# Patient Record
Sex: MALE | Race: BLACK OR AFRICAN AMERICAN | NOT HISPANIC OR LATINO | URBAN - METROPOLITAN AREA
[De-identification: names, ages, dates, MRNs, and addresses within clinical notes are randomized per-mention and may not be internally consistent; named-entity substitution may affect disease eponyms.]

---

## 2017-11-24 ENCOUNTER — EMERGENCY (EMERGENCY)
Facility: HOSPITAL | Age: 67
LOS: 1 days | Discharge: HOME | End: 2017-11-24
Admitting: EMERGENCY MEDICINE
Payer: SELF-PAY

## 2017-11-24 VITALS
OXYGEN SATURATION: 99 % | SYSTOLIC BLOOD PRESSURE: 148 MMHG | DIASTOLIC BLOOD PRESSURE: 74 MMHG | WEIGHT: 205.47 LBS | HEIGHT: 64 IN | HEART RATE: 81 BPM | TEMPERATURE: 99 F | RESPIRATION RATE: 18 BRPM

## 2017-11-24 DIAGNOSIS — Z98.890 OTHER SPECIFIED POSTPROCEDURAL STATES: Chronic | ICD-10-CM

## 2017-11-24 DIAGNOSIS — Z79.899 OTHER LONG TERM (CURRENT) DRUG THERAPY: ICD-10-CM

## 2017-11-24 DIAGNOSIS — K29.70 GASTRITIS, UNSPECIFIED, WITHOUT BLEEDING: ICD-10-CM

## 2017-11-24 DIAGNOSIS — Z76.0 ENCOUNTER FOR ISSUE OF REPEAT PRESCRIPTION: ICD-10-CM

## 2017-11-24 DIAGNOSIS — I10 ESSENTIAL (PRIMARY) HYPERTENSION: ICD-10-CM

## 2017-11-24 PROCEDURE — 99283 EMERGENCY DEPT VISIT LOW MDM: CPT

## 2017-11-24 RX ORDER — OMEPRAZOLE 10 MG/1
1 CAPSULE, DELAYED RELEASE ORAL
Qty: 7 | Refills: 0
Start: 2017-11-24 | End: 2017-12-01

## 2017-11-24 NOTE — ED PROVIDER NOTE - MEDICAL DECISION MAKING DETAILS
Prescription approved per [facility].     Due for office visit/lab by 11/2017 - noted in pharmacy comments.    pt requesting refill for ppi, has not f/u w/gi clinic, offers no active c/o, diet modifications and proper f/u for endoscopy/colonoscopy discussed, pt agreeable w/plan

## 2017-11-24 NOTE — ED PROVIDER NOTE - OBJECTIVE STATEMENT
The pt is a 68 y/o undomicile M, who presents to ED requesting rx for omeprazole for his gastritis, has not gone for gi f/u, states occasional acid feeling. Denies n/v/d, abd pain, cp, sob, fevers, chills

## 2022-06-27 NOTE — ED PROVIDER NOTE - CONSTITUTIONAL DISTRESS
Patient comes to clinic for follow up anticoagulation visit.  Last INR on 6/1/22 was 2.6.  Dose maintained.   Today's INR is 2.3 and is within goal range.    Current warfarin total weekly dose of 24 mg verified.  Informed the INR result is within therapeutic range and instructed to maintain current dose per protocol. Discussed dose and return date of 7/26/22 for next INR. See Anticoagulation flowsheet.      INR today is 2.3, in range and down from previous INR 2.6, in four weeks on dose of 24mg/wk. Denies missed doses. No s/s of bldg. Pt very SOB at today's visit. No CP today and attributes it to significant dependent edema after sitting four days for a conference. AAC strongly advised PCP or WIC care to evaluate. 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 Pt will obtain Emergency care if develops CP or SOB worsens. Pt to continue same TWD of 24mg/wk; Recheck INR in four weeks.    Dr. Guardado is in the office today supervising the treatment.    Instructed to contact the clinic with any unusual bleeding or bruising, any changes in medications, diet, health status, lifestyle, or any other changes, questions or concerns. Verbalized understanding of all discussed.     
no apparent

## 2023-03-15 ENCOUNTER — INPATIENT (INPATIENT)
Facility: HOSPITAL | Age: 73
LOS: 8 days | Discharge: HOME | DRG: 922 | End: 2023-03-24
Attending: INTERNAL MEDICINE | Admitting: INTERNAL MEDICINE
Payer: MEDICAID

## 2023-03-15 VITALS
SYSTOLIC BLOOD PRESSURE: 169 MMHG | HEART RATE: 58 BPM | RESPIRATION RATE: 18 BRPM | TEMPERATURE: 82 F | OXYGEN SATURATION: 98 % | WEIGHT: 160.06 LBS | DIASTOLIC BLOOD PRESSURE: 75 MMHG

## 2023-03-15 DIAGNOSIS — R09.89 OTHER SPECIFIED SYMPTOMS AND SIGNS INVOLVING THE CIRCULATORY AND RESPIRATORY SYSTEMS: ICD-10-CM

## 2023-03-15 DIAGNOSIS — Z98.890 OTHER SPECIFIED POSTPROCEDURAL STATES: Chronic | ICD-10-CM

## 2023-03-15 LAB
A1C WITH ESTIMATED AVERAGE GLUCOSE RESULT: 6 % — HIGH (ref 4–5.6)
ALBUMIN SERPL ELPH-MCNC: 2.8 G/DL — LOW (ref 3.3–5)
ALBUMIN SERPL ELPH-MCNC: 3.7 G/DL — SIGNIFICANT CHANGE UP (ref 3.3–5)
ALP SERPL-CCNC: 139 U/L — HIGH (ref 40–120)
ALP SERPL-CCNC: 189 U/L — HIGH (ref 40–120)
ALT FLD-CCNC: 43 U/L — SIGNIFICANT CHANGE UP (ref 10–45)
ALT FLD-CCNC: 54 U/L — HIGH (ref 10–45)
AMPHET UR-MCNC: NEGATIVE — SIGNIFICANT CHANGE UP
ANION GAP SERPL CALC-SCNC: 7 MMOL/L — SIGNIFICANT CHANGE UP (ref 5–17)
ANION GAP SERPL CALC-SCNC: 8 MMOL/L — SIGNIFICANT CHANGE UP (ref 5–17)
ANISOCYTOSIS BLD QL: SIGNIFICANT CHANGE UP
APAP SERPL-MCNC: <5 UG/ML — LOW (ref 10–30)
APTT BLD: 45.7 SEC — HIGH (ref 27.5–35.5)
AST SERPL-CCNC: 47 U/L — HIGH (ref 10–40)
AST SERPL-CCNC: 57 U/L — HIGH (ref 10–40)
BARBITURATES UR SCN-MCNC: NEGATIVE — SIGNIFICANT CHANGE UP
BASE EXCESS BLDV CALC-SCNC: 1.2 MMOL/L — SIGNIFICANT CHANGE UP (ref -2–3)
BASOPHILS # BLD AUTO: 0.02 K/UL — SIGNIFICANT CHANGE UP (ref 0–0.2)
BASOPHILS NFR BLD AUTO: 0.9 % — SIGNIFICANT CHANGE UP (ref 0–2)
BENZODIAZ UR-MCNC: NEGATIVE — SIGNIFICANT CHANGE UP
BILIRUB SERPL-MCNC: 0.2 MG/DL — SIGNIFICANT CHANGE UP (ref 0.2–1.2)
BILIRUB SERPL-MCNC: 0.2 MG/DL — SIGNIFICANT CHANGE UP (ref 0.2–1.2)
BUN SERPL-MCNC: 10 MG/DL — SIGNIFICANT CHANGE UP (ref 7–23)
BUN SERPL-MCNC: 12 MG/DL — SIGNIFICANT CHANGE UP (ref 7–23)
BURR CELLS BLD QL SMEAR: PRESENT — SIGNIFICANT CHANGE UP
CA-I SERPL-SCNC: 1.26 MMOL/L — SIGNIFICANT CHANGE UP (ref 1.15–1.33)
CALCIUM SERPL-MCNC: 8.6 MG/DL — SIGNIFICANT CHANGE UP (ref 8.4–10.5)
CALCIUM SERPL-MCNC: 9.8 MG/DL — SIGNIFICANT CHANGE UP (ref 8.4–10.5)
CHLORIDE SERPL-SCNC: 107 MMOL/L — SIGNIFICANT CHANGE UP (ref 96–108)
CHLORIDE SERPL-SCNC: 109 MMOL/L — HIGH (ref 96–108)
CO2 BLDV-SCNC: 31.2 MMOL/L — HIGH (ref 22–26)
CO2 SERPL-SCNC: 26 MMOL/L — SIGNIFICANT CHANGE UP (ref 22–31)
CO2 SERPL-SCNC: 28 MMOL/L — SIGNIFICANT CHANGE UP (ref 22–31)
COCAINE METAB.OTHER UR-MCNC: NEGATIVE — SIGNIFICANT CHANGE UP
CREAT SERPL-MCNC: 0.69 MG/DL — SIGNIFICANT CHANGE UP (ref 0.5–1.3)
CREAT SERPL-MCNC: 0.8 MG/DL — SIGNIFICANT CHANGE UP (ref 0.5–1.3)
DACRYOCYTES BLD QL SMEAR: SLIGHT — SIGNIFICANT CHANGE UP
EGFR: 94 ML/MIN/1.73M2 — SIGNIFICANT CHANGE UP
EGFR: 98 ML/MIN/1.73M2 — SIGNIFICANT CHANGE UP
EOSINOPHIL # BLD AUTO: 0.04 K/UL — SIGNIFICANT CHANGE UP (ref 0–0.5)
EOSINOPHIL NFR BLD AUTO: 1.7 % — SIGNIFICANT CHANGE UP (ref 0–6)
ESTIMATED AVERAGE GLUCOSE: 126 MG/DL — HIGH (ref 68–114)
ETHANOL SERPL-MCNC: <10 MG/DL — SIGNIFICANT CHANGE UP (ref 0–10)
FLUAV AG NPH QL: SIGNIFICANT CHANGE UP
FLUBV AG NPH QL: SIGNIFICANT CHANGE UP
GAS PNL BLDV: 140 MMOL/L — SIGNIFICANT CHANGE UP (ref 136–145)
GAS PNL BLDV: SIGNIFICANT CHANGE UP
GAS PNL BLDV: SIGNIFICANT CHANGE UP
GIANT PLATELETS BLD QL SMEAR: PRESENT — SIGNIFICANT CHANGE UP
GLUCOSE BLDC GLUCOMTR-MCNC: 97 MG/DL — SIGNIFICANT CHANGE UP (ref 70–99)
GLUCOSE SERPL-MCNC: 47 MG/DL — CRITICAL LOW (ref 70–99)
GLUCOSE SERPL-MCNC: 77 MG/DL — SIGNIFICANT CHANGE UP (ref 70–99)
HCO3 BLDV-SCNC: 29 MMOL/L — SIGNIFICANT CHANGE UP (ref 22–29)
HCT VFR BLD CALC: 36.1 % — LOW (ref 39–50)
HGB BLD-MCNC: 10.9 G/DL — LOW (ref 13–17)
HYPOCHROMIA BLD QL: SIGNIFICANT CHANGE UP
INR BLD: 1.02 — SIGNIFICANT CHANGE UP (ref 0.88–1.16)
LACTATE SERPL-SCNC: 0.8 MMOL/L — SIGNIFICANT CHANGE UP (ref 0.5–2)
LEGIONELLA AG UR QL: NEGATIVE — SIGNIFICANT CHANGE UP
LEGIONELLA AG UR QL: NEGATIVE — SIGNIFICANT CHANGE UP
LIDOCAIN IGE QN: 22 U/L — SIGNIFICANT CHANGE UP (ref 7–60)
LYMPHOCYTES # BLD AUTO: 0.55 K/UL — LOW (ref 1–3.3)
LYMPHOCYTES # BLD AUTO: 25.2 % — SIGNIFICANT CHANGE UP (ref 13–44)
MACROCYTES BLD QL: SLIGHT — SIGNIFICANT CHANGE UP
MAGNESIUM SERPL-MCNC: 1.8 MG/DL — SIGNIFICANT CHANGE UP (ref 1.6–2.6)
MANUAL SMEAR VERIFICATION: SIGNIFICANT CHANGE UP
MCHC RBC-ENTMCNC: 20.8 PG — LOW (ref 27–34)
MCHC RBC-ENTMCNC: 30.2 GM/DL — LOW (ref 32–36)
MCV RBC AUTO: 68.9 FL — LOW (ref 80–100)
METHADONE UR-MCNC: NEGATIVE — SIGNIFICANT CHANGE UP
MICROCYTES BLD QL: SIGNIFICANT CHANGE UP
MONOCYTES # BLD AUTO: 0.11 K/UL — SIGNIFICANT CHANGE UP (ref 0–0.9)
MONOCYTES NFR BLD AUTO: 5.2 % — SIGNIFICANT CHANGE UP (ref 2–14)
NEUTROPHILS # BLD AUTO: 1.41 K/UL — LOW (ref 1.8–7.4)
NEUTROPHILS NFR BLD AUTO: 64.4 % — SIGNIFICANT CHANGE UP (ref 43–77)
OPIATES UR-MCNC: NEGATIVE — SIGNIFICANT CHANGE UP
OSMOLALITY SERPL: 294 MOSM/KG — SIGNIFICANT CHANGE UP (ref 280–301)
OVALOCYTES BLD QL SMEAR: SLIGHT — SIGNIFICANT CHANGE UP
PCO2 BLDV: 61 MMHG — HIGH (ref 42–55)
PCP SPEC-MCNC: SIGNIFICANT CHANGE UP
PCP UR-MCNC: NEGATIVE — SIGNIFICANT CHANGE UP
PH BLDV: 7.29 — LOW (ref 7.32–7.43)
PHOSPHATE SERPL-MCNC: 3.6 MG/DL — SIGNIFICANT CHANGE UP (ref 2.5–4.5)
PLAT MORPH BLD: ABNORMAL
PLATELET # BLD AUTO: 271 K/UL — SIGNIFICANT CHANGE UP (ref 150–400)
PO2 BLDV: 46 MMHG — HIGH (ref 25–45)
POIKILOCYTOSIS BLD QL AUTO: SIGNIFICANT CHANGE UP
POLYCHROMASIA BLD QL SMEAR: SLIGHT — SIGNIFICANT CHANGE UP
POTASSIUM BLDV-SCNC: 3.7 MMOL/L — SIGNIFICANT CHANGE UP (ref 3.5–5.1)
POTASSIUM SERPL-MCNC: 3.5 MMOL/L — SIGNIFICANT CHANGE UP (ref 3.5–5.3)
POTASSIUM SERPL-MCNC: 4 MMOL/L — SIGNIFICANT CHANGE UP (ref 3.5–5.3)
POTASSIUM SERPL-SCNC: 3.5 MMOL/L — SIGNIFICANT CHANGE UP (ref 3.5–5.3)
POTASSIUM SERPL-SCNC: 4 MMOL/L — SIGNIFICANT CHANGE UP (ref 3.5–5.3)
PROT SERPL-MCNC: 5.8 G/DL — LOW (ref 6–8.3)
PROT SERPL-MCNC: 7.6 G/DL — SIGNIFICANT CHANGE UP (ref 6–8.3)
PROTHROM AB SERPL-ACNC: 12.1 SEC — SIGNIFICANT CHANGE UP (ref 10.5–13.4)
RAPID RVP RESULT: SIGNIFICANT CHANGE UP
RBC # BLD: 5.24 M/UL — SIGNIFICANT CHANGE UP (ref 4.2–5.8)
RBC # FLD: 18.5 % — HIGH (ref 10.3–14.5)
RBC BLD AUTO: ABNORMAL
RSV RNA NPH QL NAA+NON-PROBE: SIGNIFICANT CHANGE UP
S PNEUM AG UR QL: NEGATIVE — SIGNIFICANT CHANGE UP
S PNEUM AG UR QL: NEGATIVE — SIGNIFICANT CHANGE UP
SALICYLATES SERPL-MCNC: <0.3 MG/DL — LOW (ref 2.8–20)
SAO2 % BLDV: 73.1 % — SIGNIFICANT CHANGE UP (ref 67–88)
SARS-COV-2 RNA SPEC QL NAA+PROBE: SIGNIFICANT CHANGE UP
SARS-COV-2 RNA SPEC QL NAA+PROBE: SIGNIFICANT CHANGE UP
SCHISTOCYTES BLD QL AUTO: SLIGHT — SIGNIFICANT CHANGE UP
SMUDGE CELLS # BLD: PRESENT — SIGNIFICANT CHANGE UP
SODIUM SERPL-SCNC: 142 MMOL/L — SIGNIFICANT CHANGE UP (ref 135–145)
SODIUM SERPL-SCNC: 143 MMOL/L — SIGNIFICANT CHANGE UP (ref 135–145)
SPHEROCYTES BLD QL SMEAR: SLIGHT — SIGNIFICANT CHANGE UP
TARGETS BLD QL SMEAR: SIGNIFICANT CHANGE UP
THC UR QL: NEGATIVE — SIGNIFICANT CHANGE UP
TSH SERPL-MCNC: 1.25 UIU/ML — SIGNIFICANT CHANGE UP (ref 0.27–4.2)
VARIANT LYMPHS # BLD: 2.6 % — SIGNIFICANT CHANGE UP (ref 0–6)
WBC # BLD: 2.19 K/UL — LOW (ref 3.8–10.5)
WBC # FLD AUTO: 2.19 K/UL — LOW (ref 3.8–10.5)

## 2023-03-15 PROCEDURE — 71045 X-RAY EXAM CHEST 1 VIEW: CPT | Mod: 26

## 2023-03-15 PROCEDURE — 71260 CT THORAX DX C+: CPT | Mod: 26

## 2023-03-15 PROCEDURE — 72125 CT NECK SPINE W/O DYE: CPT | Mod: 26

## 2023-03-15 PROCEDURE — 70450 CT HEAD/BRAIN W/O DYE: CPT | Mod: 26

## 2023-03-15 PROCEDURE — 99291 CRITICAL CARE FIRST HOUR: CPT

## 2023-03-15 PROCEDURE — 99291 CRITICAL CARE FIRST HOUR: CPT | Mod: GC

## 2023-03-15 PROCEDURE — 74177 CT ABD & PELVIS W/CONTRAST: CPT | Mod: 26

## 2023-03-15 RX ORDER — PIPERACILLIN AND TAZOBACTAM 4; .5 G/20ML; G/20ML
3.38 INJECTION, POWDER, LYOPHILIZED, FOR SOLUTION INTRAVENOUS ONCE
Refills: 0 | Status: COMPLETED | OUTPATIENT
Start: 2023-03-15 | End: 2023-03-15

## 2023-03-15 RX ORDER — NICOTINE POLACRILEX 2 MG
1 GUM BUCCAL DAILY
Refills: 0 | Status: DISCONTINUED | OUTPATIENT
Start: 2023-03-15 | End: 2023-03-16

## 2023-03-15 RX ORDER — VANCOMYCIN HCL 1 G
1000 VIAL (EA) INTRAVENOUS ONCE
Refills: 0 | Status: COMPLETED | OUTPATIENT
Start: 2023-03-15 | End: 2023-03-15

## 2023-03-15 RX ORDER — SODIUM CHLORIDE 9 MG/ML
1000 INJECTION INTRAMUSCULAR; INTRAVENOUS; SUBCUTANEOUS ONCE
Refills: 0 | Status: COMPLETED | OUTPATIENT
Start: 2023-03-15 | End: 2023-03-15

## 2023-03-15 RX ORDER — ENOXAPARIN SODIUM 100 MG/ML
70 INJECTION SUBCUTANEOUS EVERY 12 HOURS
Refills: 0 | Status: DISCONTINUED | OUTPATIENT
Start: 2023-03-15 | End: 2023-03-17

## 2023-03-15 RX ORDER — NOREPINEPHRINE BITARTRATE/D5W 8 MG/250ML
0.05 PLASTIC BAG, INJECTION (ML) INTRAVENOUS
Qty: 8 | Refills: 0 | Status: DISCONTINUED | OUTPATIENT
Start: 2023-03-15 | End: 2023-03-15

## 2023-03-15 RX ORDER — CHLORHEXIDINE GLUCONATE 213 G/1000ML
1 SOLUTION TOPICAL
Refills: 0 | Status: DISCONTINUED | OUTPATIENT
Start: 2023-03-15 | End: 2023-03-16

## 2023-03-15 RX ORDER — INFLUENZA VIRUS VACCINE 15; 15; 15; 15 UG/.5ML; UG/.5ML; UG/.5ML; UG/.5ML
0.7 SUSPENSION INTRAMUSCULAR ONCE
Refills: 0 | Status: DISCONTINUED | OUTPATIENT
Start: 2023-03-15 | End: 2023-03-24

## 2023-03-15 RX ORDER — PANTOPRAZOLE SODIUM 20 MG/1
40 TABLET, DELAYED RELEASE ORAL
Refills: 0 | Status: DISCONTINUED | OUTPATIENT
Start: 2023-03-15 | End: 2023-03-24

## 2023-03-15 RX ADMIN — SODIUM CHLORIDE 1000 MILLILITER(S): 9 INJECTION INTRAMUSCULAR; INTRAVENOUS; SUBCUTANEOUS at 09:43

## 2023-03-15 RX ADMIN — PIPERACILLIN AND TAZOBACTAM 200 GRAM(S): 4; .5 INJECTION, POWDER, LYOPHILIZED, FOR SOLUTION INTRAVENOUS at 10:25

## 2023-03-15 RX ADMIN — SODIUM CHLORIDE 1000 MILLILITER(S): 9 INJECTION INTRAMUSCULAR; INTRAVENOUS; SUBCUTANEOUS at 10:30

## 2023-03-15 RX ADMIN — ENOXAPARIN SODIUM 70 MILLIGRAM(S): 100 INJECTION SUBCUTANEOUS at 15:41

## 2023-03-15 RX ADMIN — Medication 250 MILLIGRAM(S): at 11:38

## 2023-03-15 RX ADMIN — Medication 6.81 MICROGRAM(S)/KG/MIN: at 12:36

## 2023-03-15 NOTE — ED PROVIDER NOTE - OBJECTIVE STATEMENT
72-year-old male undomiciled with hypertension presenting after being found on the street.  Patient states he was on the street since middle of February, states he was previously admitted to [facility], does not know why.  Patient confused but denies any acute complaints.  Denies chest pain or abdominal pain, no shortness of breath, no cough, no urinary issues.  No trauma.  ROS as above.

## 2023-03-15 NOTE — ED PROVIDER NOTE - CLINICAL SUMMARY MEDICAL DECISION MAKING FREE TEXT BOX
72-year-old male with history of hypertension, gastritis, undomiciled presenting hypothermic to 81 °F, confused, bradycardic with Mosher wave on EKG, possible environmentally induced but with possible infiltrate on chest x-ray.  Ordered for broad-spectrum antibiotics.  Patient placed on Stefany hugger giving warm saline, will place Ashley and perform bladder irrigation, labs pending.  Discussed with ICU consult.
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
## 2023-03-15 NOTE — ED PROVIDER NOTE - NS ED ROS FT
Constitutional: No fever or chills  Eyes: No discharge or drainage  Ears, Nose, Mouth, Throat: No nasal discharge, no sore throat  Cardiovascular: No chest pain, no palpitations  Respiratory: No shortness of breath, no cough  Gastrointestinal: No nausea or vomiting, no abdominal pain, no diarrhea or constipation  Musculoskeletal: No joint pain, no swelling  Skin: No rashes or lesions  Neurological: No numbness, weakness, tingling, no headache  Endocrine: No nightsweats, no weight loss  Hematologic/Lymphatic: No lymphadenopathy

## 2023-03-15 NOTE — H&P ADULT - ASSESSMENT
71 y/o M with an unknown pmhx, who presented to the ED with severe hypothermia to 81.5 F and sepsis of unknown origin.   ICU consulted for severe hypothermia.    NEUROLOGY  #Encephalopathy  Unknown baseline mental status, patient was found in the street confused. Now awake and responsive but not interacting. Likely secondary to hypothermia secondary to sepsis of unknown origin.   - treat underlying hypothermia and sepsis as below  - f/u urine tox, alcohol level, acetaminophen tox, and salycilate tox screen    # r/o head trauma  Patient is a poor historian but endorsed falling and possible head trauma. Endorsed ttp of right aspect of face. Resists attempt to open eyes for pupillary exam.   - CT head and cervical spine    CARDIOVASCULAR  # Hypothermia  Presented with severe hypothermia, with Core (Rectal) Temp 81.5 deg F. Started on Acute Internal Rewarming Protocol.  - started on warmed isotonic fluids and warmed bladder irrigation  - goal increase core temp by 2 degrees centigrade q 1 hour  - continue with warmed isotonic IV fluids (warmed to 40-42 degrees C)  - monitor core temperature     #Hypotension  #Distributive shock  Shock secondary to hypothermia and subsequent vasodilation from rewarming, process secondary to sepsis of unknown origin.   - receiving second bolus of warmed NS now  - started levophed for MAP goal of 65    RESPIRATORY   Normal sats on room air.     RENAL   - placed zheng  - monitor I/O    ENDOCRINE  - f/u A1C, TSH  - monitor fsg as pt not taking PO    GASTROINTESTINAL  # remote history of GI Bleed  Patient reported to ED in 2017 a history of "gastric surgery for GI bleed" and was on Omeprazole at the time. P/w Hemoglobin 10.9. No hematemesis on exam. Unable to obtain ROS.  - obtain better ROS/collateral when possible  - monitor H/H    INFECTIOUS DISEASE  # Severe sepsis  Unknown etiology. Presenting with leukopenia, hypothermia and requiring pressor support. Has LE swelling bilaterally. Abdomen soft on exam. No ulcers or overt abscess noted on physical exam. No pulmonoary consolidation on bedside POCUS.  - full infectious workup:  - blood cultures, urinalysis/cx. RVP, ur legionella, ur strep  - f/u CT abdomen, pelvis, chest  - Treat empirically with vancomycin and zosyn for now    HEMATOLOGY/ONCOLOGY  # Leukopenia  - treat for sepsis as above    # anemia with microcytosis  - obtain iron studies  - active T&S    MSK  # LE swelling  - f/u bilateral LE duplex      PROPHYLACTIC MEASURES  FLUIDS: receiving warmed NS currently  ELECTROLYTES: Mg<2, K<4  DIET: NPO  VTE PPX: SCD, lovenox  CODE: FULL  DISPO: MICU  71 y/o undomiciled Male current smoker (1ppd x50+yrs), w/ PSHx of unspecified GI surgery reportedly for GIB, and PMHx of HTN who was initially BIBEMS for dizziness, inability to walk and AMS, was found to be profoundly hypothermic and is now admitted to MICU.    NEUROLOGY  #AMS/Encephalopathy  Initially minimally interactive only nodding head, now returned to baselined MS (A+Ox3) and does remember events leading up to 911 being called. CT head neg for trauma/acute changes. Likely etiology hypothermia  - Treatment of hypothermia as below  - UTox ordered, f/u    CARDIOVASCULAR  #Hypotension  #Distributive shock  Shock secondary to hypothermia and subsequent vasodilation from rewarming, process secondary to sepsis of unknown origin.   - c/w active rewarming protocol, (goal temp 96F)  - c/w Levo ggt (MAP goal 65-70), wean as tolerated    HTN  Reports hx of HTN, SBP 160s on arrival. Reports being Rx'd anti-HTN meds in the past but does not known which and hasn't been taking  - Determine need for anti-HTN once off pressors    RESPIRATORY  #Active smoker  - Nicotine patch q24hrs PRN ordered (1ppd x50+ years)    RENAL  No active issues, renal function at baseline  -Ashley placed in ED 3/15 for active rewarming bladder irrigation, may be DC'd once that's complete    ENDOCRINE  #Hypothermia  P/w severe hypothermia w/ T81.5F rectally. TSH WNL. Most likely etiology environmental exposure  - c/w Active Internal Rewarming Protocol including bare hugger, warmed IVF and bladder irrigation (goal to increase T 2 degrees Celcius/hour until goal T 96.0F)    #pre-DM  A1c 6.0 this admission  - FS BGL per routine while NPO pending dysphagia screen    GASTROINTESTINAL  #Remote hx of GIB  Pt reports hx of "gastric surgery for GI bleed" in 2017 which he believes was @[facility] but no documentation available. Reports previously taking Omeprazole but has not taken any Rx's in some time  Hb 10.9 on admission. No evidence of GIB at this time, pt denies recent melena/hematochezia/hematemesis  - Started Pantoprazole 40mg PO QD  ____________________________________________________________________________________________________  ID  #Leukopenia  P/w WBC 2.19 w/o shift. Unknown etiology. Non-toxic appearing, hypothermia, no clear evidence of infection  - full infectious workup:  - blood cultures, urinalysis/cx. RVP, ur legionella, ur strep  - f/u CT abdomen, pelvis, chest  - Treat empirically with vancomycin and zosyn for now    HEMATOLOGY/ONCOLOGY  # Leukopenia  - treat for sepsis as above    #Microcytic anemia  - obtain iron studies  - active T&S    MSK  # LE swelling  - f/u bilateral LE duplex    Lines/Tubes    PROPHYLACTIC MEASURES  FLUIDS: receiving warmed NS currently  ELECTROLYTES: Mg<2, K<4  DIET: NPO  VTE PPX: SCD, lovenox  CODE: FULL  DISPO: MICU  73 y/o undomiciled Male current smoker (1ppd x50+yrs), w/ PSHx of unspecified GI surgery reportedly for GIB, and PMHx of HTN who was initially BIBEMS for dizziness, inability to walk and AMS, was found to be profoundly hypothermic and is now admitted to MICU.    NEUROLOGY  #AMS/Encephalopathy  Initially minimally interactive only nodding head, now returned to baselined MS (A+Ox3) and does remember events leading up to 911 being called. CT head neg for trauma/acute changes. Likely etiology hypothermia  - Treatment of hypothermia as below  - UTox ordered, f/u      CARDIOVASCULAR  #Hypotension  Likely etiology distributive shock 2/2 vasodilation from active rewarming. Lactate WNL.  - c/w active rewarming protocol, (goal temp 96F)  - c/w Levo ggt (MAP goal 65-70), wean as tolerated    #Bradycardia  Initially p/w HR 31 w/ Collado waves on EKG. Likely etiology hypothermia  - Mgmt of hypothermia as below    #r/o CHF  Denies hx of CHF but reports worsening b/l LE pitting edema  - Echo ordered, f/u    #r/o DVT  Given finding of PE and worsening b/l LE pitting edema  - LE duplex ordered, f/u    HTN  Reports hx of HTN, SBP 160s on arrival. Reports being Rx'd anti-HTN meds in the past but does not known which and hasn't been taking  - Determine need for anti-HTN once off pressors      RESPIRATORY  #Pulmonary embolism  - CT chest showing RLL segmental PE w/o evidence of RV dysfunction  - Started on therapeutic dose Lovenox  - Echo ordered as above, f/u  - Poor candidate for Coumadin 2/2 hx of medication non-compliance and undomiciled    #Active smoker  - Nicotine patch q24hrs PRN ordered (1ppd x50+ years)      RENAL  No active issues, renal function at baseline  -Zheng placed in ED 3/15 for active rewarming bladder irrigation, may be DC'd once that's complete      ENDOCRINE  #Hypothermia  P/w severe hypothermia w/ T81.5F rectally. TSH WNL. Most likely etiology environmental exposure  - c/w Active Internal Rewarming Protocol including bare hugger, warmed IVF and bladder irrigation (goal to increase T 2 degrees Celcius/hour until goal T 96.0F)    #pre-DM  A1c 6.0 this admission      GASTROINTESTINAL  #Remote hx of GIB  Pt reports hx of "gastric surgery for GI bleed" in 2017 which he believes was @[facility] but no documentation available. Reports previously taking Omeprazole but has not taken any Rx's in some time  Hb 10.9 on admission. No evidence of GIB at this time, pt denies recent melena/hematochezia/hematemesis  - Started Pantoprazole 40mg PO QD      ID  #Leukopenia  P/w WBC 2.19 w/o shift. Unknown etiology. Non-toxic appearing, hypothermia, no clear evidence of infection.  Urine leg neg, RVP neg. S/p 1 dose of Vanco and Zosyn in the ED, not continued  - Blood and Urine Cx sent 3/15, f/u  - UA, Urine strep, and MRSA swab sent, f/u  - Monitor off abx for now      HEMATOLOGY/ONCOLOGY  # Leukopenia  - treat for sepsis as above    #Microcytic anemia  P/w Hb 10.9 and MCV 68.9  - Iron studies sent, f/u  - Maintain active T&S (next due 3/19)  - Transfused for Hb <7    Lines/Tubes  -Indwelling zheng (placed 3/15 for active rewarming protocol only)  -PIV x2    PROPHYLACTIC MEASURES  FLUIDS: None  ELECTROLYTES: Mg<2, K<4  DIET: DASH  VTE PPX: Lovenox (treatment dose for PE)  CODE: FULL  DISPO: MICU    Case d/w Dr. Jensen
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
## 2023-03-15 NOTE — PATIENT PROFILE ADULT - HISTORY OF COVID-19 VACCINATION
Referral was placed on 1/26/17 by urgent care MD.    Please see below and advise.    Medication pending   Yes

## 2023-03-15 NOTE — H&P ADULT - HISTORY OF PRESENT ILLNESS
Patient is a 73 y/o M with unknown past medical history, was BIBEMS after being found outside appearing confused. In the ED, patient was found to be severely hypothermic to 81.5 core rectal temp. Initially unable to review ROS as patient was minimally interactive, just nodding yes/no. Per ED staff, patient was initially talking when he first presented to the ED, and he reported that he had been admitted at [facility] for unknown reason, was discharged to the street on February 17th, and has been on the street since then. Per chart review, patient presented to ED in 2017 and at the time reported a history of a "gastric surgery for GI bleeding" and was on Omeprazole.     ED COURSE:   Vitals: T 81.5 HR 30s, /75 --> 70s/40s RR 18 - 98% on RA  Labs: leukopenia WBC 2.19, Hb 10.9, microcytosis 68.9, Plt 271, bmp wnl, lactate 0.8  Interventions: Started on Acute Internal Rewarming protocol with: Warmed isotonic fluids, warmed bladder irrigation, bare hugger, vancomycin, zosyn 73 y/o undomiciled Male current smoker (1ppd x50+yrs), w/ PSHx of unspecified GI surgery reportedly for GIB, and PMHx of HTN who was initially BIBEMS after being found outside AMS c/o dizziness and inability to walk. On arrival to ED VS: T 81.5F rectal, HR On arrival ROS difficult to obtained as pt was minimally interactive, only nodding.    ________________________     was BIBEMS after being found outside appearing confused. In the ED, patient was found to be severely hypothermic to 81.5 core rectal temp. Initially unable to review ROS as patient was minimally interactive, just nodding yes/no. Per ED staff, patient was initially talking when he first presented to the ED, and he reported that he had been admitted at [facility] for unknown reason, was discharged to the street on February 17th, and has been on the street since then. Per chart review, patient presented to ED in 2017 and at the time reported a history of a "gastric surgery for GI bleeding" and was on Omeprazole.     ED COURSE:   Vitals: T 81.5 HR 30s, /75 --> 70s/40s RR 18 - 98% on RA  Labs: leukopenia WBC 2.19, Hb 10.9, microcytosis 68.9, Plt 271, bmp wnl, lactate 0.8  Interventions: Started on Acute Internal Rewarming protocol with: Warmed isotonic fluids, warmed bladder irrigation, bare hugger, vancomycin, zosyn 73 y/o undomiciled Male current smoker (1ppd x50+yrs), w/ PSHx of unspecified GI surgery reportedly for GIB, and PMHx of HTN who was initially BIBEMS after being found outside AMS c/o dizziness and inability to walk. On arrival to ED VS: T 81.5F rectal, HR 58, /75, RR 18, SpO2 98% on RA. On arrival ROS difficult to obtained as pt was minimally interactive, only nodding his head. Of note, pt was recently admitted to [facility] for an unspecified reason and has not followed up with any doctors since, routinely takes no meds at home and no longer has an established PCP. Pt denies chest pain, SOB, YI, palpitations, LOC, N/V/D, fever/chills/sick contacts, diaphoresis, and orthopnea/PND. Labs significant for COVID PCR neg, WBC 2.19, Hb 10.9, MVC 68, mild transaminitis, and TSH WNL.  MICU was consulted by ED and evaluated at bedside. Pt started on acute internal rewarming protocol including warmed IVF, warmed bladder irrigation, and bare hugger applied, started on empiric Vanco and Zosyn, and was subsequently admitted to the MICU for further management and evaluation.

## 2023-03-15 NOTE — CONSULT NOTE ADULT - SUBJECTIVE AND OBJECTIVE BOX
ICU CONSULT NOTE    Patient is a 72y old  Male who presents with a chief complaint of     ROS positive for      PMHx -   PSx -   Meds -   Allergies -   FHx -   Sx -     PHYSICAL EXAM   Vital Signs Last 24 Hrs  T(C): 31.7 (15 Mar 2023 11:21), Max: 31.7 (15 Mar 2023 11:21)  T(F): 89.1 (15 Mar 2023 11:21), Max: 89.1 (15 Mar 2023 11:21)  HR: 41 (15 Mar 2023 11:21) (33 - 58)  BP: 98/51 (15 Mar 2023 11:21) (79/42 - 169/75)  BP(mean): 69 (15 Mar 2023 11:21) (55 - 100)  RR: 16 (15 Mar 2023 11:21) (16 - 18)  SpO2: 91% (15 Mar 2023 11:21) (91% - 100%)    Parameters below as of 15 Mar 2023 11:21  Patient On (Oxygen Delivery Method): room air        General -   HEENT -   CV -   Resp -   Abdomen -   Extremities -   Skin -       LABS                        10.9   2.19  )-----------( 271      ( 15 Mar 2023 09:11 )             36.1     03-15    142  |  107  |  12  ----------------------------<  77  4.0   |  28  |  0.69    Ca    9.8      15 Mar 2023 09:11    TPro  7.6  /  Alb  3.7  /  TBili  0.2  /  DBili  x   /  AST  57<H>  /  ALT  54<H>  /  AlkPhos  189<H>  03-15    PT/INR - ( 15 Mar 2023 09:11 )   PT: 12.1 sec;   INR: 1.02          PTT - ( 15 Mar 2023 09:11 )  PTT:45.7 sec  Lactate, Blood: 0.8 mmol/L (03-15-23 @ 09:11)        IMAGING   CXR -   EKG -  ICU CONSULT NOTE    Patient is a 72y old  Male with unknown past medical history, who was BIBEMS after being found outside appearing confused, and was found in the ED to be severely hypothermic to 81.5 core rectal temp.   ICU consulted for severe hypothermia.     Unable to review ROS as patient is minimally interactive. Nods yes/no. Confirms his name, and answers yes to questions about being cold, having a fall, and possibly a head trauama. Does not provide more information. Shakes head no to abdominal pain.   Per ED staff, patient was initially talking when he first presented to the ED, and he reported that he had been admitted at [facility] for unknown reason, was discharged to the street on February 17th, and has been on the street since then.   Per chart review, patient presented to ED in 2017 and at the time reported a history of a "gastric surgery for GI bleeding" and was on Omeprazole.     ED COURSE:   Vitals: T 81.5 HR jigar down to 30s, BP initially 166/75 --> 70s/40s RR 18 - 98% - RA  Labs: notable for leukopenia wbc 2.19, Hb 10.9, microcytosis 68.9, Plt 271, bmp wnl, lactate 0.8  Interventions: Started on Acute Internal Rewarming protocol with: Warmed isotonic fluids, warmed bladder irrigation, bare hugger, vancomycin, zosyn    PHYSICAL EXAM   Vital Signs Last 24 Hrs  T(C): 31.7 (15 Mar 2023 11:21), Max: 31.7 (15 Mar 2023 11:21)  T(F): 89.1 (15 Mar 2023 11:21), Max: 89.1 (15 Mar 2023 11:21)  HR: 41 (15 Mar 2023 11:21) (33 - 58)  BP: 98/51 (15 Mar 2023 11:21) (79/42 - 169/75)  BP(mean): 69 (15 Mar 2023 11:21) (55 - 100)  RR: 16 (15 Mar 2023 11:21) (16 - 18)  SpO2: 91% (15 Mar 2023 11:21) (91% - 100%)    Parameters below as of 15 Mar 2023 11:21  Patient On (Oxygen Delivery Method): room air      PHYSICAL EXAM:  General: Somnolent, keeping eyes shut, arousable, nods head yes/no  HEENT: Holding eyes tightly shut, resists attempt to open eyes   Neck: supple  Respiratory: CTA b/l  Cardiovascular: +S1/S2; RRR  Abdomen: soft, NT/ND; +BS x4  Extremities: LE edema, R>L  Skin: normal color and turgor; no rash  Neurological: Encephalopathic, AAOx0 (does not answer orientation questions)      LABS                        10.9   2.19  )-----------( 271      ( 15 Mar 2023 09:11 )             36.1     03-15    142  |  107  |  12  ----------------------------<  77  4.0   |  28  |  0.69    Ca    9.8      15 Mar 2023 09:11    TPro  7.6  /  Alb  3.7  /  TBili  0.2  /  DBili  x   /  AST  57<H>  /  ALT  54<H>  /  AlkPhos  189<H>  03-15    PT/INR - ( 15 Mar 2023 09:11 )   PT: 12.1 sec;   INR: 1.02          PTT - ( 15 Mar 2023 09:11 )  PTT:45.7 sec  Lactate, Blood: 0.8 mmol/L (03-15-23 @ 09:11)

## 2023-03-15 NOTE — CONSULT NOTE ADULT - ASSESSMENT
REYNA GARCIA is a 72y Male with a past medical history of   ICU consulted for     NEUROLOGY  #Encephalopathy  Unknown baseline mental status, patient was found in the street confused. Now awake and responsive but not interacting. Likely secondary to hypothermia secondary to sepsis of unknown origin.   - treat underlying hypothermia and sepsis as below  - f/u urine tox, alcohol level, acetaminophen tox, and salycilate tox screen    CARDIOVASCULAR  #Hypotension  #Distributive shock  Shock secondary to hypothermia and subsequent vasodilation from rewarming, process secondary to sepsis of unknown origin.   - receiving second bolus of warmed NS now  - started levophed for MAP goal of 65    RESPIRATORY       RENAL       ENDOCRINE      GASTROINTESTINAL      INFECTIOUS DISEASE      HEMATOLOGY/ONCOLOGY      MSK      METABOLIC      ACCESS/LINES/DRAINS      ETHICS/GOALS OF CARE      PROPHYLACTIC  FLUIDS: none at this time  ELECTROLYTES: Mg<2, K<4  DIET: regular  GI PPX: protonix  VTE PPX: SCD, lovenox  CODE: FULL  DISPO:  REYNA GARCIA is a 72y Male with an unknown PMH, who presented to the ED with severe Hypothermia to 81.5 F and sepsis of unknown origin.   ICU consulted for severe hypothermia.    NEUROLOGY  #Encephalopathy  Unknown baseline mental status, patient was found in the street confused. Now awake and responsive but not interacting. Likely secondary to hypothermia secondary to sepsis of unknown origin.   - treat underlying hypothermia and sepsis as below  - f/u urine tox, alcohol level, acetaminophen tox, and salycilate tox screen    # r/o head trauma  Patient is a poor historian but endorsed falling and possible head trauma. Endorsed ttp of right aspect of face. Resists attempt to open eyes for pupillary exam.   - CT head and cervical spine    CARDIOVASCULAR  # Hypothermia  Presented with severe hypothermia, with Core (Rectal) Temp 81.5 deg F. Started on Acute Internal Rewarming Protocol.  - started on warmed isotonic fluids and warmed bladder irrigation  - goal increase core temp by 2 degrees centigrade q 1 hour  - continue with warmed isotonic IV fluids (warmed to 40-42 degrees C)  - monitor core temperature     #Hypotension  #Distributive shock  Shock secondary to hypothermia and subsequent vasodilation from rewarming, process secondary to sepsis of unknown origin.   - receiving second bolus of warmed NS now  - started levophed for MAP goal of 65    RESPIRATORY   Normal sats on room air.     RENAL   - placed zheng  - monitor I/O    ENDOCRINE  - f/u A1C, TSH  - monitor fsg as pt not taking PO    GASTROINTESTINAL  # remote history of GI Bleed  Patient reported to ED in 2017 a history of "gastric surgery for GI bleed" and was on Omeprazole at the time. P/w Hemoglobin 10.9. No hematemesis on exam. Unable to obtain ROS.  - obtain better ROS/collateral when possible  - monitor H/H    INFECTIOUS DISEASE  # Severe sepsis  Unknown etiology. Presenting with leukopenia, hypothermia and requiring pressor support. Has LE swelling bilaterally. Abdomen soft on exam. No ulcers or overt abscess noted on physical exam. No pulmonoary consolidation on bedside POCUS.  - full infectious workup:  - blood cultures, urinalysis/cx. RVP, ur legionella, ur strep  - f/u CT abdomen, pelvis, chest  - Treat empirically with vancomycin and zosyn for now    HEMATOLOGY/ONCOLOGY  # Leukopenia  - treat for sepsis as above    # anemia with microcytosis  - obtain iron studies  - active T&S    MSK  # LE swelling  - f/u bilateral LE duplex      PROPHYLACTIC  FLUIDS: receiving warmed NS currently  ELECTROLYTES: Mg<2, K<4  DIET: NPO  VTE PPX: SCD, lovenox  CODE: FULL  DISPO: MICU

## 2023-03-15 NOTE — PATIENT PROFILE ADULT - FALL HARM RISK - HARM RISK INTERVENTIONS

## 2023-03-15 NOTE — ED PROVIDER NOTE - PHYSICAL EXAMINATION
General: Disheveled, in mild distress  HEENT: Normocephalic, atraumatic, extraocular movements intact  CV: Bradycardic  Pulm: No respiratory distress, no tachypnea  Abd: Flat, no gross distension, soft, nontender  Ext: warm and well perfused  Skin: No gross rashes or lesions  Neuro: Alert and oriented but confused, moving all extremities, repetitive answering of questions

## 2023-03-15 NOTE — H&P ADULT - MUSCULOSKELETAL
normal/ROM intact/normal gait/strength 5/5 bilateral upper extremities/strength 5/5 bilateral lower extremities/decreased strength
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
## 2023-03-15 NOTE — ED ADULT NURSE NOTE - NS ED NOTE ABUSE RESPONSE YN
I called patient and there was a bad phone connections so she stated that she would call back. Unable to assess due to medical condition

## 2023-03-15 NOTE — PROGRESS NOTE ADULT - SUBJECTIVE AND OBJECTIVE BOX
*** TRANSFER FROM MICU TO [facility] ***    HOSPITAL COURSE:  73 YO undomiciled M with PMHx current active smoker (1PPD x 50 years), PSHx GI surgery, HTN BIBEMS for dizziness and confusion found to be hypothermic to 81.5F. Patient admitted to MICU for active rewarming protocol, levophed gtt for vasodilation while rewarming, and strict UOP monitoring s/p Ashley in ED. Upon arrival to MICU, patient warmed to 95-96, AOx3, awake/alert, mentating well, passed bedside dysphagia. Patient remained off levophed gtt and stable for stepdown to [facility].    SUBJECTIVE:   Patient seen and examined at bedside. Feels well, pleasant. AOx3.     OBJECTIVE:    VITAL SIGNS:  ICU Vital Signs Last 24 Hrs  T(C): 35.1 (15 Mar 2023 14:48), Max: 35.1 (15 Mar 2023 14:48)  T(F): 95.2 (15 Mar 2023 14:48), Max: 95.2 (15 Mar 2023 14:48)  HR: 81 (15 Mar 2023 17:00) (33 - 81)  BP: 107/66 (15 Mar 2023 17:00) (79/42 - 169/75)  BP(mean): 79 (15 Mar 2023 17:00) (55 - 100)  RR: 16 (15 Mar 2023 17:00) (12 - 31)  SpO2: 100% (15 Mar 2023 17:00) (93% - 100%)    O2 Parameters below as of 15 Mar 2023 17:00  Patient On (Oxygen Delivery Method): room air              03-15 @ 07:01  -  03-15 @ 17:38  --------------------------------------------------------  IN: 0 mL / OUT: 325 mL / NET: -325 mL      CAPILLARY BLOOD GLUCOSE      POCT Blood Glucose.: 97 mg/dL (15 Mar 2023 17:19)      PHYSICAL EXAM:    General: NAD; Lying comfortably in bed  HEENT: NC/AT; MMM. Exophthalmos (R>L)   Neck: Supple. No JVD or thyromegaly   Respiratory: CTA b/l. No wheezes, rhonchi, rales.  Cardiovascular: +S1/S2; RRR; No murmurs, rubs, gallops.  Abdomen: soft, NT/ND; +BS x4  Extremities: WWP, 2+ peripheral pulses b/l; no LE edema  Neurological: AOx3    MEDICATIONS:  MEDICATIONS  (STANDING):  chlorhexidine 4% Liquid 1 Application(s) Topical <User Schedule>  enoxaparin Injectable 70 milliGRAM(s) SubCutaneous every 12 hours  influenza  Vaccine (HIGH DOSE) 0.7 milliLiter(s) IntraMuscular once  pantoprazole    Tablet 40 milliGRAM(s) Oral before breakfast    MEDICATIONS  (PRN):  nicotine - 21 mG/24Hr(s) Patch 1 Patch Transdermal daily PRN Cravings      ALLERGIES:  Allergies    No Known Allergies    Intolerances        LABS:                        10.9   2.19  )-----------( 271      ( 15 Mar 2023 09:11 )             36.1     03-15    143  |  109<H>  |  10  ----------------------------<  47<LL>  3.5   |  26  |  0.80    Ca    8.6      15 Mar 2023 16:42  Phos  3.6     03-15  Mg     1.8     03-15    TPro  5.8<L>  /  Alb  2.8<L>  /  TBili  0.2  /  DBili  x   /  AST  47<H>  /  ALT  43  /  AlkPhos  139<H>  03-15    PT/INR - ( 15 Mar 2023 09:11 )   PT: 12.1 sec;   INR: 1.02          PTT - ( 15 Mar 2023 09:11 )  PTT:45.7 sec      RADIOLOGY & ADDITIONAL TESTS: Reviewed.   *** TRANSFER FROM MICU TO [facility] ***    HOSPITAL COURSE:  71 YO undomiciled M with PMHx current active smoker (1PPD x 50 years), PSHx GI surgery, HTN BIBEMS for dizziness and confusion found to be hypothermic to 81.5F. Patient admitted to MICU for active rewarming protocol, levophed gtt for vasodilation while rewarming, and strict UOP monitoring s/p Ashley in ED. Upon arrival to MICU, patient warmed to 95-96, AOx3, awake/alert, mentating well, passed bedside dysphagia. Hospital course complicated by PE, started on full dose lovenox. Patient remained off levophed gtt and stable for stepdown to [facility].    SUBJECTIVE:   Patient seen and examined at bedside. Feels well, pleasant. AOx3.     OBJECTIVE:    VITAL SIGNS:  ICU Vital Signs Last 24 Hrs  T(C): 35.1 (15 Mar 2023 14:48), Max: 35.1 (15 Mar 2023 14:48)  T(F): 95.2 (15 Mar 2023 14:48), Max: 95.2 (15 Mar 2023 14:48)  HR: 81 (15 Mar 2023 17:00) (33 - 81)  BP: 107/66 (15 Mar 2023 17:00) (79/42 - 169/75)  BP(mean): 79 (15 Mar 2023 17:00) (55 - 100)  RR: 16 (15 Mar 2023 17:00) (12 - 31)  SpO2: 100% (15 Mar 2023 17:00) (93% - 100%)    O2 Parameters below as of 15 Mar 2023 17:00  Patient On (Oxygen Delivery Method): room air              03-15 @ 07:01  -  03-15 @ 17:38  --------------------------------------------------------  IN: 0 mL / OUT: 325 mL / NET: -325 mL      CAPILLARY BLOOD GLUCOSE      POCT Blood Glucose.: 97 mg/dL (15 Mar 2023 17:19)      PHYSICAL EXAM:    General: NAD; Lying comfortably in bed  HEENT: NC/AT; MMM. Exophthalmos (R>L)   Neck: Supple. No JVD or thyromegaly   Respiratory: CTA b/l. No wheezes, rhonchi, rales.  Cardiovascular: +S1/S2; RRR; No murmurs, rubs, gallops.  Abdomen: soft, NT/ND; +BS x4  Extremities: WWP, 2+ peripheral pulses b/l; no LE edema  Neurological: AOx3    MEDICATIONS:  MEDICATIONS  (STANDING):  chlorhexidine 4% Liquid 1 Application(s) Topical <User Schedule>  enoxaparin Injectable 70 milliGRAM(s) SubCutaneous every 12 hours  influenza  Vaccine (HIGH DOSE) 0.7 milliLiter(s) IntraMuscular once  pantoprazole    Tablet 40 milliGRAM(s) Oral before breakfast    MEDICATIONS  (PRN):  nicotine - 21 mG/24Hr(s) Patch 1 Patch Transdermal daily PRN Cravings      ALLERGIES:  Allergies    No Known Allergies    Intolerances        LABS:                        10.9   2.19  )-----------( 271      ( 15 Mar 2023 09:11 )             36.1     03-15    143  |  109<H>  |  10  ----------------------------<  47<LL>  3.5   |  26  |  0.80    Ca    8.6      15 Mar 2023 16:42  Phos  3.6     03-15  Mg     1.8     03-15    TPro  5.8<L>  /  Alb  2.8<L>  /  TBili  0.2  /  DBili  x   /  AST  47<H>  /  ALT  43  /  AlkPhos  139<H>  03-15    PT/INR - ( 15 Mar 2023 09:11 )   PT: 12.1 sec;   INR: 1.02          PTT - ( 15 Mar 2023 09:11 )  PTT:45.7 sec      RADIOLOGY & ADDITIONAL TESTS: Reviewed.

## 2023-03-15 NOTE — H&P ADULT - NSICDXPASTMEDICALHX_GEN_ALL_CORE_FT
PAST MEDICAL HISTORY:  Gastritis     Hypertension      PAST MEDICAL HISTORY:  Current smoker     GIB (gastrointestinal bleeding)     Hypertension

## 2023-03-15 NOTE — H&P ADULT - NSHPLABSRESULTS_GEN_ALL_CORE
LABS:                         10.9   2.19  )-----------( 271      ( 15 Mar 2023 09:11 )             36.1     03-15    142  |  107  |  12  ----------------------------<  77  4.0   |  28  |  0.69    Ca    9.8      15 Mar 2023 09:11    TPro  7.6  /  Alb  3.7  /  TBili  0.2  /  DBili  x   /  AST  57<H>  /  ALT  54<H>  /  AlkPhos  189<H>  03-15    PT/INR - ( 15 Mar 2023 09:11 )   PT: 12.1 sec;   INR: 1.02          PTT - ( 15 Mar 2023 09:11 )  PTT:45.7 sec    CAPILLARY BLOOD GLUCOSE    Lactate, Blood: 0.8 mmol/L (03-15 @ 09:11)    RADIOLOGY, EKG & ADDITIONAL TESTS:

## 2023-03-15 NOTE — PROGRESS NOTE ADULT - ASSESSMENT
73 y/o undomiciled Male current smoker (1ppd x50+yrs), w/ PSHx of unspecified GI surgery reportedly for GIB, and PMHx of HTN who was initially BIBEMS for dizziness, inability to walk and AMS, was found to be profoundly hypothermic and is now admitted to MICU.    NEUROLOGY  #AMS/Encephalopathy  Initially minimally interactive only nodding head, now returned to baselined MS (A+Ox3) and does remember events leading up to 911 being called. CT head neg for trauma/acute changes. Likely etiology hypothermia. Upon arrival to MICU and rewarmed, now mentating at baseline.   - Treatment of hypothermia as below  - UTox ordered, F/U    CARDIOVASCULAR  #Hypotension  Likely etiology distributive shock 2/2 vasodilation from active rewarming. Lactate WNL.  - Now off levophed gtt     #Bradycardia  Initially p/w HR 31 w/ Collado waves on EKG. Likely etiology hypothermia. HR improved upon rewarming.     #r/o CHF  Denies hx of CHF but reports worsening b/l LE pitting edema  - Echo ordered, f/u    #r/o DVT  Given finding of PE and worsening b/l LE pitting edema  - LE duplex ordered, f/u    RESPIRATORY  #Pulmonary embolism  No known or prior hx of PE. Undomiciled but mobile. No known hx of Afib, has been in NSR while admitted on telemetry.  - CT chest showing RLL segmental PE w/o evidence of RV dysfunction  - Started on therapeutic dose Lovenox --> will need to determine outpatient regimen if pt accepts shelter  - Echo ordered as above, f/u  - Will need further workup regarding etiology of PE    #Active smoker  - Nicotine patch q24hrs PRN ordered (1ppd x50+ years)    RENAL  No active issues, renal function at baseline  -Ashley placed in ED 3/15 for active rewarming bladder irrigation  - Removed in MICU prior to stepdown   - F/U TOV    ENDOCRINE  #Hypothermia  P/w severe hypothermia w/ T81.5F rectally. TSH WNL. Most likely etiology environmental exposure  - c/w Active Internal Rewarming Protocol including bare hugger, warmed IVF and bladder irrigation (goal to increase T 2 degrees Celcius/hour until goal T 96.0F)    #pre-DM  A1c 6.0 this admission      GASTROINTESTINAL  #Remote hx of GIB  Pt reports hx of "gastric surgery for GI bleed" in 2017 which he believes was @[facility] but no documentation available. Reports previously taking Omeprazole but has not taken any Rx's in some time  Hb 10.9 on admission. No evidence of GIB at this time, pt denies recent melena/hematochezia/hematemesis  - Started Pantoprazole 40mg PO QD      ID  #Leukopenia  P/w WBC 2.19 w/o shift. Unknown etiology. Non-toxic appearing, hypothermia, no clear evidence of infection.  Urine leg neg, RVP neg. S/p 1 dose of Vanco and Zosyn in the ED, not continued  - Blood and Urine Cx sent 3/15, f/u  - UA, Urine strep, and MRSA swab sent, f/u  - Monitor off abx for now      HEMATOLOGY/ONCOLOGY  #Microcytic anemia  P/w Hb 10.9 and MCV 68.9  - Iron studies sent, f/u  - Maintain active T&S (next due 3/19)  - Transfused for Hb <7    PROPHYLACTIC MEASURES  FLUIDS: None  ELECTROLYTES: Mg<2, K<4  DIET: DASH  VTE PPX: Lovenox (treatment dose for PE)  CODE: FULL  DISPO: MICU 71 y/o undomiciled Male current smoker (1ppd x50+yrs), w/ PSHx of unspecified GI surgery reportedly for GIB, and PMHx of HTN who was initially BIBEMS for dizziness, inability to walk and AMS, was found to be profoundly hypothermic and is now admitted to MICU. Hospital course complicated by PE, started on full dose lovenox. Now stable for transfer to [facility].    NEUROLOGY  #AMS/Encephalopathy  Initially minimally interactive only nodding head, now returned to baselined MS (A+Ox3) and does remember events leading up to 911 being called. CT head neg for trauma/acute changes. Likely etiology hypothermia. Upon arrival to MICU and rewarmed, now mentating at baseline.   - Treatment of hypothermia as below  - UTox ordered, F/U    CARDIOVASCULAR  #Hypotension  Likely etiology distributive shock 2/2 vasodilation from active rewarming. Lactate WNL.  - Now off levophed gtt     #Bradycardia  Initially p/w HR 31 w/ Collado waves on EKG. Likely etiology hypothermia. HR improved upon rewarming.     #r/o CHF  Denies hx of CHF but reports worsening b/l LE pitting edema  - Echo ordered, f/u    #r/o DVT  Given finding of PE and worsening b/l LE pitting edema  - LE duplex ordered, f/u    RESPIRATORY  #Pulmonary embolism  No known or prior hx of PE. Undomiciled but mobile. No known hx of Afib, has been in NSR while admitted on telemetry.  - CT chest showing RLL segmental PE w/o evidence of RV dysfunction  - Started on therapeutic dose Lovenox --> will need to determine outpatient regimen if pt accepts shelter  - Echo ordered as above, f/u  - Will need further workup regarding etiology of PE    #Active smoker  - Nicotine patch q24hrs PRN ordered (1ppd x50+ years)    RENAL  No active issues, renal function at baseline  -Ashley placed in ED 3/15 for active rewarming bladder irrigation  - Removed in MICU prior to stepdown   - F/U TOV    ENDOCRINE  #Hypothermia  P/w severe hypothermia w/ T81.5F rectally. TSH WNL. Most likely etiology environmental exposure  - c/w Active Internal Rewarming Protocol including bare hugger, warmed IVF and bladder irrigation (goal to increase T 2 degrees Celcius/hour until goal T 96.0F)    #pre-DM  A1c 6.0 this admission      GASTROINTESTINAL  #Remote hx of GIB  Pt reports hx of "gastric surgery for GI bleed" in 2017 which he believes was @[facility] but no documentation available. Reports previously taking Omeprazole but has not taken any Rx's in some time  Hb 10.9 on admission. No evidence of GIB at this time, pt denies recent melena/hematochezia/hematemesis  - Started Pantoprazole 40mg PO QD      ID  #Leukopenia  P/w WBC 2.19 w/o shift. Unknown etiology. Non-toxic appearing, hypothermia, no clear evidence of infection.  Urine leg neg, RVP neg. S/p 1 dose of Vanco and Zosyn in the ED, not continued  - Blood and Urine Cx sent 3/15, f/u  - UA, Urine strep, and MRSA swab sent, f/u  - Monitor off abx for now      HEMATOLOGY/ONCOLOGY  #Microcytic anemia  P/w Hb 10.9 and MCV 68.9  - Iron studies sent, f/u  - Maintain active T&S (next due 3/19)  - Transfused for Hb <7    PROPHYLACTIC MEASURES  FLUIDS: None  ELECTROLYTES: Mg<2, K<4  DIET: DASH  VTE PPX: Lovenox (treatment dose for PE)  CODE: FULL  DISPO: MICU

## 2023-03-15 NOTE — ED ADULT NURSE REASSESSMENT NOTE - NS ED NURSE REASSESS COMMENT FT1
ICU team at bedside
Urology at bedside for zhegn insertion.
unable to obtain rectal temp. pt cold to touch. pt placed on janett hugger and given warm blankets. Pt Alert, awake, orientedx4, maintaining airway w spontaneous resps, unlabored breathing, following commands appropriately, able to move all extremities.
IV sites clean, dry, intact. No adverse rx to first time meds in ED. Pending ICU bed. Continuous cardiac/pulse oximetry monitoring intact.

## 2023-03-15 NOTE — H&P ADULT - NSHPPHYSICALEXAM_GEN_ALL_CORE
PHYSICAL EXAM: INCOMPLETE......    Vital Signs Last 24 Hrs  T(C): 35.1 (15 Mar 2023 14:48), Max: 35.1 (15 Mar 2023 14:48)  T(F): 95.2 (15 Mar 2023 14:48), Max: 95.2 (15 Mar 2023 14:48)  HR: 71 (15 Mar 2023 15:00) (33 - 75)  BP: 119/56 (15 Mar 2023 15:00) (79/42 - 169/75)  BP(mean): 81 (15 Mar 2023 15:00) (55 - 100)  RR: 31 (15 Mar 2023 15:00) (16 - 31)  SpO2: 100% (15 Mar 2023 15:00) (93% - 100%)    Parameters below as of 15 Mar 2023 15:00  Patient On (Oxygen Delivery Method): room air      I&O's Summary    15 Mar 2023 07:01  -  15 Mar 2023 15:09  --------------------------------------------------------  IN: 0 mL / OUT: 225 mL / NET: -225 mL        General: somnolent but arousable, nodding head yes/no  HEENT: Holding eyes tightly shut, resists attempt to open eyes   Neck: supple  Respiratory: CTAB  Cardiovascular: +S1/S2; RRR  Abdomen: soft, NT/ND; +BS x4  Extremities: LE edema, R>L  Skin: normal color and turgor; no rash  Neurological: Encephalopathic,

## 2023-03-16 DIAGNOSIS — D72.819 DECREASED WHITE BLOOD CELL COUNT, UNSPECIFIED: ICD-10-CM

## 2023-03-16 DIAGNOSIS — Z29.9 ENCOUNTER FOR PROPHYLACTIC MEASURES, UNSPECIFIED: ICD-10-CM

## 2023-03-16 DIAGNOSIS — I10 ESSENTIAL (PRIMARY) HYPERTENSION: ICD-10-CM

## 2023-03-16 DIAGNOSIS — I26.99 OTHER PULMONARY EMBOLISM WITHOUT ACUTE COR PULMONALE: ICD-10-CM

## 2023-03-16 DIAGNOSIS — R68.89 OTHER GENERAL SYMPTOMS AND SIGNS: ICD-10-CM

## 2023-03-16 DIAGNOSIS — G93.49 OTHER ENCEPHALOPATHY: ICD-10-CM

## 2023-03-16 DIAGNOSIS — R60.0 LOCALIZED EDEMA: ICD-10-CM

## 2023-03-16 DIAGNOSIS — R73.03 PREDIABETES: ICD-10-CM

## 2023-03-16 DIAGNOSIS — N17.9 ACUTE KIDNEY FAILURE, UNSPECIFIED: ICD-10-CM

## 2023-03-16 DIAGNOSIS — Z87.19 PERSONAL HISTORY OF OTHER DISEASES OF THE DIGESTIVE SYSTEM: ICD-10-CM

## 2023-03-16 DIAGNOSIS — D64.9 ANEMIA, UNSPECIFIED: ICD-10-CM

## 2023-03-16 LAB
ALBUMIN SERPL ELPH-MCNC: 2.9 G/DL — LOW (ref 3.3–5)
ALP SERPL-CCNC: 156 U/L — HIGH (ref 40–120)
ALT FLD-CCNC: 47 U/L — HIGH (ref 10–45)
ANION GAP SERPL CALC-SCNC: 7 MMOL/L — SIGNIFICANT CHANGE UP (ref 5–17)
APPEARANCE UR: CLEAR — SIGNIFICANT CHANGE UP
AST SERPL-CCNC: 53 U/L — HIGH (ref 10–40)
BASOPHILS # BLD AUTO: 0.03 K/UL — SIGNIFICANT CHANGE UP (ref 0–0.2)
BASOPHILS NFR BLD AUTO: 1 % — SIGNIFICANT CHANGE UP (ref 0–2)
BILIRUB SERPL-MCNC: <0.2 MG/DL — SIGNIFICANT CHANGE UP (ref 0.2–1.2)
BILIRUB SERPL-MCNC: <0.2 MG/DL — SIGNIFICANT CHANGE UP (ref 0.2–1.2)
BILIRUB UR-MCNC: NEGATIVE — SIGNIFICANT CHANGE UP
BUN SERPL-MCNC: 14 MG/DL — SIGNIFICANT CHANGE UP (ref 7–23)
CALCIUM SERPL-MCNC: 8.8 MG/DL — SIGNIFICANT CHANGE UP (ref 8.4–10.5)
CHLORIDE SERPL-SCNC: 110 MMOL/L — HIGH (ref 96–108)
CO2 SERPL-SCNC: 28 MMOL/L — SIGNIFICANT CHANGE UP (ref 22–31)
COLOR SPEC: YELLOW — SIGNIFICANT CHANGE UP
CREAT ?TM UR-MCNC: 36 MG/DL — SIGNIFICANT CHANGE UP
CREAT SERPL-MCNC: 1.15 MG/DL — SIGNIFICANT CHANGE UP (ref 0.5–1.3)
CREAT SERPL-MCNC: 1.19 MG/DL — SIGNIFICANT CHANGE UP (ref 0.5–1.3)
DIFF PNL FLD: NEGATIVE — SIGNIFICANT CHANGE UP
EGFR: 65 ML/MIN/1.73M2 — SIGNIFICANT CHANGE UP
EGFR: 68 ML/MIN/1.73M2 — SIGNIFICANT CHANGE UP
EOSINOPHIL # BLD AUTO: 0.03 K/UL — SIGNIFICANT CHANGE UP (ref 0–0.5)
EOSINOPHIL NFR BLD AUTO: 1 % — SIGNIFICANT CHANGE UP (ref 0–6)
FERRITIN SERPL-MCNC: 33 NG/ML — SIGNIFICANT CHANGE UP (ref 30–400)
GLUCOSE BLDC GLUCOMTR-MCNC: 111 MG/DL — HIGH (ref 70–99)
GLUCOSE BLDC GLUCOMTR-MCNC: 167 MG/DL — HIGH (ref 70–99)
GLUCOSE BLDC GLUCOMTR-MCNC: 71 MG/DL — SIGNIFICANT CHANGE UP (ref 70–99)
GLUCOSE BLDC GLUCOMTR-MCNC: 90 MG/DL — SIGNIFICANT CHANGE UP (ref 70–99)
GLUCOSE SERPL-MCNC: 64 MG/DL — LOW (ref 70–99)
GLUCOSE UR QL: NEGATIVE — SIGNIFICANT CHANGE UP
HCT VFR BLD CALC: 29.6 % — LOW (ref 39–50)
HCV AB S/CO SERPL IA: 0.04 S/CO — SIGNIFICANT CHANGE UP
HCV AB SERPL-IMP: SIGNIFICANT CHANGE UP
HGB BLD-MCNC: 9.1 G/DL — LOW (ref 13–17)
IMM GRANULOCYTES NFR BLD AUTO: 0 % — SIGNIFICANT CHANGE UP (ref 0–0.9)
INR BLD: 1.01 — SIGNIFICANT CHANGE UP (ref 0.88–1.16)
IRON SATN MFR SERPL: 21 % — SIGNIFICANT CHANGE UP (ref 16–55)
IRON SATN MFR SERPL: 56 UG/DL — SIGNIFICANT CHANGE UP (ref 45–165)
KETONES UR-MCNC: NEGATIVE — SIGNIFICANT CHANGE UP
LEUKOCYTE ESTERASE UR-ACNC: NEGATIVE — SIGNIFICANT CHANGE UP
LYMPHOCYTES # BLD AUTO: 0.75 K/UL — LOW (ref 1–3.3)
LYMPHOCYTES # BLD AUTO: 24.3 % — SIGNIFICANT CHANGE UP (ref 13–44)
MAGNESIUM SERPL-MCNC: 2 MG/DL — SIGNIFICANT CHANGE UP (ref 1.6–2.6)
MCHC RBC-ENTMCNC: 21.2 PG — LOW (ref 27–34)
MCHC RBC-ENTMCNC: 30.7 GM/DL — LOW (ref 32–36)
MCV RBC AUTO: 68.8 FL — LOW (ref 80–100)
MELD SCORE WITH DIALYSIS: 20 POINTS — SIGNIFICANT CHANGE UP
MELD SCORE WITHOUT DIALYSIS: 8 POINTS — SIGNIFICANT CHANGE UP
MONOCYTES # BLD AUTO: 0.39 K/UL — SIGNIFICANT CHANGE UP (ref 0–0.9)
MONOCYTES NFR BLD AUTO: 12.6 % — SIGNIFICANT CHANGE UP (ref 2–14)
MRSA PCR RESULT.: NEGATIVE — SIGNIFICANT CHANGE UP
NEUTROPHILS # BLD AUTO: 1.89 K/UL — SIGNIFICANT CHANGE UP (ref 1.8–7.4)
NEUTROPHILS NFR BLD AUTO: 61.1 % — SIGNIFICANT CHANGE UP (ref 43–77)
NITRITE UR-MCNC: NEGATIVE — SIGNIFICANT CHANGE UP
NRBC # BLD: 0 /100 WBCS — SIGNIFICANT CHANGE UP (ref 0–0)
OSMOLALITY UR: 230 MOSM/KG — LOW (ref 300–900)
PH UR: 6 — SIGNIFICANT CHANGE UP (ref 5–8)
PHOSPHATE SERPL-MCNC: 3.8 MG/DL — SIGNIFICANT CHANGE UP (ref 2.5–4.5)
PLATELET # BLD AUTO: 252 K/UL — SIGNIFICANT CHANGE UP (ref 150–400)
POTASSIUM SERPL-MCNC: 4 MMOL/L — SIGNIFICANT CHANGE UP (ref 3.5–5.3)
POTASSIUM SERPL-SCNC: 4 MMOL/L — SIGNIFICANT CHANGE UP (ref 3.5–5.3)
POTASSIUM UR-SCNC: 9 MMOL/L — SIGNIFICANT CHANGE UP
PROT ?TM UR-MCNC: 7 MG/DL — SIGNIFICANT CHANGE UP (ref 0–12)
PROT SERPL-MCNC: 6.1 G/DL — SIGNIFICANT CHANGE UP (ref 6–8.3)
PROT UR-MCNC: NEGATIVE MG/DL — SIGNIFICANT CHANGE UP
PROT/CREAT UR-RTO: 0.2 RATIO — SIGNIFICANT CHANGE UP (ref 0–0.2)
PROTHROM AB SERPL-ACNC: 12 SEC — SIGNIFICANT CHANGE UP (ref 10.5–13.4)
RBC # BLD: 4.3 M/UL — SIGNIFICANT CHANGE UP (ref 4.2–5.8)
RBC # FLD: 17.6 % — HIGH (ref 10.3–14.5)
S AUREUS DNA NOSE QL NAA+PROBE: POSITIVE
SODIUM SERPL-SCNC: 143 MMOL/L — SIGNIFICANT CHANGE UP (ref 135–145)
SODIUM SERPL-SCNC: 145 MMOL/L — SIGNIFICANT CHANGE UP (ref 135–145)
SODIUM UR-SCNC: 63 MMOL/L — SIGNIFICANT CHANGE UP
SP GR SPEC: 1.01 — SIGNIFICANT CHANGE UP (ref 1–1.03)
T4 FREE SERPL-MCNC: 0.97 NG/DL — SIGNIFICANT CHANGE UP (ref 0.93–1.7)
TIBC SERPL-MCNC: 267 UG/DL — SIGNIFICANT CHANGE UP (ref 220–430)
UIBC SERPL-MCNC: 211 UG/DL — SIGNIFICANT CHANGE UP (ref 110–370)
UROBILINOGEN FLD QL: 0.2 E.U./DL — SIGNIFICANT CHANGE UP
UUN UR-MCNC: 178 MG/DL — SIGNIFICANT CHANGE UP
WBC # BLD: 3.09 K/UL — LOW (ref 3.8–10.5)
WBC # FLD AUTO: 3.09 K/UL — LOW (ref 3.8–10.5)

## 2023-03-16 PROCEDURE — 99233 SBSQ HOSP IP/OBS HIGH 50: CPT | Mod: GC

## 2023-03-16 PROCEDURE — 93970 EXTREMITY STUDY: CPT | Mod: 26

## 2023-03-16 PROCEDURE — 93010 ELECTROCARDIOGRAM REPORT: CPT | Mod: 76

## 2023-03-16 RX ORDER — DEXTROSE 50 % IN WATER 50 %
50 SYRINGE (ML) INTRAVENOUS ONCE
Refills: 0 | Status: DISCONTINUED | OUTPATIENT
Start: 2023-03-16 | End: 2023-03-16

## 2023-03-16 RX ORDER — APIXABAN 2.5 MG/1
2 TABLET, FILM COATED ORAL
Qty: 120 | Refills: 0
Start: 2023-03-16 | End: 2023-04-14

## 2023-03-16 RX ORDER — NICOTINE POLACRILEX 2 MG
1 GUM BUCCAL DAILY
Refills: 0 | Status: DISCONTINUED | OUTPATIENT
Start: 2023-03-16 | End: 2023-03-24

## 2023-03-16 RX ORDER — DEXTROSE 10 % IN WATER 10 %
250 INTRAVENOUS SOLUTION INTRAVENOUS
Refills: 0 | Status: COMPLETED | OUTPATIENT
Start: 2023-03-16 | End: 2023-03-16

## 2023-03-16 RX ADMIN — PANTOPRAZOLE SODIUM 40 MILLIGRAM(S): 20 TABLET, DELAYED RELEASE ORAL at 06:03

## 2023-03-16 RX ADMIN — ENOXAPARIN SODIUM 70 MILLIGRAM(S): 100 INJECTION SUBCUTANEOUS at 05:57

## 2023-03-16 RX ADMIN — Medication 1000 MILLILITER(S): at 09:20

## 2023-03-16 RX ADMIN — Medication 1 PATCH: at 13:37

## 2023-03-16 RX ADMIN — Medication 1 PATCH: at 19:00

## 2023-03-16 RX ADMIN — ENOXAPARIN SODIUM 70 MILLIGRAM(S): 100 INJECTION SUBCUTANEOUS at 18:49

## 2023-03-16 NOTE — PROGRESS NOTE ADULT - PROBLEM SELECTOR PLAN 6
RESOLVED   p/w severe hypothermia w/ T81.5F rectally. TSH WNL. Most likely etiology environmental exposure  - s/p Active Internal Rewarming Protocol including bairhugger, warmed IVF and bladder irrigation (goal to increase T 2 degrees Celcius/hour until goal T 96.0F) - now normothermic      #Hypotension - RESOLVED   Likely etiology distributive shock 2/2 vasodilation from active rewarming. Lactate WNL.  - s/p active rewarming protocol, (goal temp 96F) and levophed gtt; off levophed gtt since 3/15 3pm     #Bradycardia - RESOLVED   Initially p/w HR 31 w/ Collado waves on EKG. Likely etiology hypothermia  - HR 60s-70s

## 2023-03-16 NOTE — PROGRESS NOTE ADULT - PROBLEM SELECTOR PLAN 4
Reports hx of HTN, SBP 160s on arrival. Reports being Rx'd anti-HTN meds in the past but does not known which and hasn't been taking    - normotensive currently; continue to monitor BP Hgb 10.9 on admission , MCV 68.9. Some schistocytes present. Currently no signs of active bleeding (no hematochezia, melena, hemoptysis, hematuria). Iron studies showing Iron of 56, TIBC of 267, Ferritin of 33, and Iron % Saturation of 21. 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   Plan:   - trend CBC  - maintain active T&S (next due 3/19)   - transfuse if Hgb <7

## 2023-03-16 NOTE — PROGRESS NOTE ADULT - PROBLEM SELECTOR PLAN 8
FLUIDS: None  ELECTROLYTES: Mg<2, K<4  DIET: DASH  VTE PPX: Lovenox (treatment dose for PE)  CODE: FULL  DISPO: MICU > [facility] Pt reports hx of "gastric surgery for GI bleed" in 2017 which he believes was @[facility] but no documentation available. Reports previously taking Omeprazole but has not taken any Rx's in some time. Hb 10.9 on admission.  No evidence of GIB at this time, pt denies recent melena/hematochezia/hematemesis    - c/w Pantoprazole 40mg PO QD

## 2023-03-16 NOTE — PROGRESS NOTE ADULT - SUBJECTIVE AND OBJECTIVE BOX
CC: Patient is a 72y old  Male who presents with a chief complaint of Hypothermia, AMS    * INCOMPLETE NOTE *  *STEPDOWN FROM MICU TO [facility] *  HOSPITAL COURSE:  73 y/o undomiciled M with PMHx current active smoker (1PPD x 50 years), PSHx GI surgery, HTN, BIBEMS for dizziness, difficulty with ambulation and confusion, found to be hypothermic to 81.5F (rectal), admitted to MICU for active rewarming protocol (warmed IVF, warmed bladder irrigation, bairhugger), levophed gtt for vasodilation while rewarming, and strict UOP monitoring s/p Ashley in ED. Upon arrival to MICU, patient warmed to 95-96F, AOx3, awake/alert, mentating well. Hospital course complicated by RLL segmental PE (no R heart strain), started on full dose lovenox. Off levophed gtt since 3pm, and pt is medically stable for stepdown to [facility] for further management.       INTERVAL EVENTS: LIZA  SUBJECTIVE / INTERVAL HPI: Patient seen and examined at bedside.   ROS: negative unless otherwise stated above.    VITAL SIGNS:  Vital Signs Last 24 Hrs  T(C): 36.4 (16 Mar 2023 05:09), Max: 36.4 (16 Mar 2023 01:00)  T(F): 97.6 (16 Mar 2023 05:09), Max: 97.6 (16 Mar 2023 01:00)  HR: 80 (16 Mar 2023 05:09) (33 - 81)  BP: 116/65 (16 Mar 2023 05:09) (79/42 - 169/75)  BP(mean): 82 (15 Mar 2023 23:00) (55 - 100)  RR: 17 (16 Mar 2023 05:09) (12 - 31)  SpO2: 99% (16 Mar 2023 05:09) (93% - 100%)    Parameters below as of 16 Mar 2023 05:09  Patient On (Oxygen Delivery Method): room air          03-15-23 @ 07:01  -  03-16-23 @ 06:33  --------------------------------------------------------  IN: 0 mL / OUT: 975 mL / NET: -975 mL        PHYSICAL EXAM:    General: NAD  HEENT: MMM  Neck: supple  Cardiovascular: +S1/S2; RRR  Respiratory: CTA B/L; no W/R/R  Gastrointestinal: soft, NT/ND  Extremities: WWP; no edema, clubbing or cyanosis  Vascular: 2+ radial, DP/PT pulses B/L  Neurological: AAOx3; no focal deficits    MEDICATIONS:  MEDICATIONS  (STANDING):  enoxaparin Injectable 70 milliGRAM(s) SubCutaneous every 12 hours  influenza  Vaccine (HIGH DOSE) 0.7 milliLiter(s) IntraMuscular once  pantoprazole    Tablet 40 milliGRAM(s) Oral before breakfast    MEDICATIONS  (PRN):  nicotine - 21 mG/24Hr(s) Patch 1 Patch Transdermal daily PRN Cravings      ALLERGIES:  Allergies    No Known Allergies    Intolerances        LABS:                        10.9   2.19  )-----------( 271      ( 15 Mar 2023 09:11 )             36.1     03-15    143  |  109<H>  |  10  ----------------------------<  47<LL>  3.5   |  26  |  0.80    Ca    8.6      15 Mar 2023 16:42  Phos  3.6     03-15  Mg     1.8     03-15    TPro  5.8<L>  /  Alb  2.8<L>  /  TBili  0.2  /  DBili  x   /  AST  47<H>  /  ALT  43  /  AlkPhos  139<H>  03-15    PT/INR - ( 15 Mar 2023 09:11 )   PT: 12.1 sec;   INR: 1.02          PTT - ( 15 Mar 2023 09:11 )  PTT:45.7 sec    CAPILLARY BLOOD GLUCOSE      POCT Blood Glucose.: 90 mg/dL (16 Mar 2023 02:35)      RADIOLOGY & ADDITIONAL TESTS: Reviewed. CC: Patient is a 72y old  Male who presents with a chief complaint of Hypothermia, AMS    *STEPDOWN FROM MICU TO [facility] *  HOSPITAL COURSE:  71 y/o undomiciled M with PMHx current active smoker (1PPD x 50 years), PSHx GI surgery, HTN, BIBEMS for dizziness, difficulty with ambulation and confusion, found to be hypothermic to 81.5F (rectal), admitted to MICU for active rewarming protocol (warmed IVF, warmed bladder irrigation, bairhugger), levophed gtt for vasodilation while rewarming, and strict UOP monitoring s/p Ashley in ED. Upon arrival to MICU, patient warmed to 95-96F, AOx3, awake/alert, mentating well. Hospital course complicated by RLL segmental PE (no R heart strain), started on full dose lovenox. Off levophed gtt since 3pm, and pt is medically stable for stepdown to [facility] for further management.     INTERVAL EVENTS: Pt initially seen in AM appearing somnolent but arousable to sternal rub. After reconsulting pt 5 minutes later, pt awake, alert, and talking in clear sentences. Pt states he does not currently have any SOB and cough, palpitations and CP, headaches, fevers, chills, nausea, vomiting, diarrhea, constipation, dysuria, hematuria, hematochezia. Pt endorses having swelling in his legs and having felt weak the day prior with subsequent LOC, but says he has since been treated and feeling better. Pt requesting to have "my surgeon from about 2 years ago at this hospital" to speak with him regarding a previous abdominal surgery. Discussed with pt I would look in his records to find this information.   SUBJECTIVE / INTERVAL HPI: Patient seen and examined at bedside.   ROS: negative unless otherwise stated above.    VITAL SIGNS:  Vital Signs Last 24 Hrs  T(C): 36.4 (16 Mar 2023 05:09), Max: 36.4 (16 Mar 2023 01:00)  T(F): 97.6 (16 Mar 2023 05:09), Max: 97.6 (16 Mar 2023 01:00)  HR: 80 (16 Mar 2023 05:09) (33 - 81)  BP: 116/65 (16 Mar 2023 05:09) (79/42 - 169/75)  BP(mean): 82 (15 Mar 2023 23:00) (55 - 100)  RR: 17 (16 Mar 2023 05:09) (12 - 31)  SpO2: 99% (16 Mar 2023 05:09) (93% - 100%)    Parameters below as of 16 Mar 2023 05:09  Patient On (Oxygen Delivery Method): room air          03-15-23 @ 07:01  -  03-16-23 @ 06:33  --------------------------------------------------------  IN: 0 mL / OUT: 975 mL / NET: -975 mL        PHYSICAL EXAM:  General: Alert and oriented x 3. No acute distress. Disheveled appearing.   Eyes: EOMI. Anicteric. + copious clear discharge in lower eyelid   HEENT: Moist mucous membranes. No scleral icterus. No cervical lymphadenopathy. + Dentures   Lungs: No accessory muscle use. Some fine crackles @ L upper apex, but otherwise CTAB   Cardiovascular: Regular rate and rhythm. No murmur. No JVD.  Abdomen: Soft, non-tender and non-distended. No palpable masses. + Ventral abdominal scar noted.   Extremities: Non-tender. + Edematous, non pitting b/l lower extremities with Onychomycotic nails.   Skin: No rashes or lesions. Warm.  Neurologic: No focal neurological deficits. CN II-XII grossly intact, but not individually tested.  Psychiatric: Cooperative. Appropriate mood and affect.      MEDICATIONS:  MEDICATIONS  (STANDING):  enoxaparin Injectable 70 milliGRAM(s) SubCutaneous every 12 hours  influenza  Vaccine (HIGH DOSE) 0.7 milliLiter(s) IntraMuscular once  pantoprazole    Tablet 40 milliGRAM(s) Oral before breakfast    MEDICATIONS  (PRN):  nicotine - 21 mG/24Hr(s) Patch 1 Patch Transdermal daily PRN Cravings      ALLERGIES:  Allergies    No Known Allergies    Intolerances        LABS:                        10.9   2.19  )-----------( 271      ( 15 Mar 2023 09:11 )             36.1     03-15    143  |  109<H>  |  10  ----------------------------<  47<LL>  3.5   |  26  |  0.80    Ca    8.6      15 Mar 2023 16:42  Phos  3.6     03-15  Mg     1.8     03-15    TPro  5.8<L>  /  Alb  2.8<L>  /  TBili  0.2  /  DBili  x   /  AST  47<H>  /  ALT  43  /  AlkPhos  139<H>  03-15    PT/INR - ( 15 Mar 2023 09:11 )   PT: 12.1 sec;   INR: 1.02          PTT - ( 15 Mar 2023 09:11 )  PTT:45.7 sec    CAPILLARY BLOOD GLUCOSE      POCT Blood Glucose.: 90 mg/dL (16 Mar 2023 02:35)      RADIOLOGY & ADDITIONAL TESTS: Reviewed. CC: Patient is a 72y old  Male who presents with a chief complaint of Hypothermia, AMS    *STEPDOWN FROM MICU TO [facility] *  HOSPITAL COURSE:  73 y/o undomiciled M with PMHx current active smoker (1PPD x 50 years), PSHx GI surgery, HTN, BIBEMS for dizziness, difficulty with ambulation and confusion, found to be hypothermic to 81.5F (rectal), admitted to MICU for active rewarming protocol (warmed IVF, warmed bladder irrigation, bairhugger), levophed gtt for vasodilation while rewarming, and strict UOP monitoring s/p Ashley in ED. Upon arrival to MICU, patient warmed to 95-96F, AOx3, awake/alert, mentating well. Hospital course complicated by RLL segmental PE (no R heart strain), started on full dose lovenox. Off levophed gtt since 3pm, and pt is medically stable for stepdown to [facility] for further management.     INTERVAL EVENTS: Pt initially seen in AM appearing somnolent but arousable to sternal rub. After reconsulting pt 5 minutes later, pt awake, alert, and talking in clear sentences. Pt states he does not currently have any SOB and cough, palpitations and CP, headaches, fevers, chills, nausea, vomiting, diarrhea, constipation, dysuria, hematuria, hematochezia. Pt endorses having swelling in his legs and having felt weak the day prior with subsequent LOC, but says he has since been treated and feeling better. Pt requesting to have "my surgeon from about 2 years ago at this hospital" to speak with him regarding a previous abdominal surgery. Discussed with pt I would look in his records to find this information.   SUBJECTIVE / INTERVAL HPI: Patient seen and examined at bedside.   ROS: negative unless otherwise stated above.    VITAL SIGNS:  Vital Signs Last 24 Hrs  T(C): 36.4 (16 Mar 2023 05:09), Max: 36.4 (16 Mar 2023 01:00)  T(F): 97.6 (16 Mar 2023 05:09), Max: 97.6 (16 Mar 2023 01:00)  HR: 80 (16 Mar 2023 05:09) (33 - 81)  BP: 116/65 (16 Mar 2023 05:09) (79/42 - 169/75)  BP(mean): 82 (15 Mar 2023 23:00) (55 - 100)  RR: 17 (16 Mar 2023 05:09) (12 - 31)  SpO2: 99% (16 Mar 2023 05:09) (93% - 100%)    Parameters below as of 16 Mar 2023 05:09  Patient On (Oxygen Delivery Method): room air          03-15-23 @ 07:01  -  03-16-23 @ 06:33  --------------------------------------------------------  IN: 0 mL / OUT: 975 mL / NET: -975 mL        PHYSICAL EXAM:  General: Alert and oriented x 3. No acute distress. Disheveled appearing.   Eyes: EOMI. Anicteric. + copious clear discharge in lower eyelid   HEENT: Moist mucous membranes. No scleral icterus. No cervical lymphadenopathy. Missing teeth upper and lower jaw.   Lungs: No accessory muscle use. Some fine crackles @ L upper apex, but otherwise CTAB   Cardiovascular: Regular rate and rhythm. No murmur. No JVD.  Abdomen: Soft, non-tender and non-distended. No palpable masses. + Ventral abdominal scar noted.   Extremities: Non-tender. + Edematous, non pitting b/l lower extremities with Onychomycotic nails.   Skin: No rashes or lesions. Warm.  Neurologic: No focal neurological deficits. CN II-XII grossly intact, but not individually tested.  Psychiatric: Cooperative. Appropriate mood and affect.      MEDICATIONS:  MEDICATIONS  (STANDING):  enoxaparin Injectable 70 milliGRAM(s) SubCutaneous every 12 hours  influenza  Vaccine (HIGH DOSE) 0.7 milliLiter(s) IntraMuscular once  pantoprazole    Tablet 40 milliGRAM(s) Oral before breakfast    MEDICATIONS  (PRN):  nicotine - 21 mG/24Hr(s) Patch 1 Patch Transdermal daily PRN Cravings      ALLERGIES:  Allergies    No Known Allergies    Intolerances        LABS:                        10.9   2.19  )-----------( 271      ( 15 Mar 2023 09:11 )             36.1     03-15    143  |  109<H>  |  10  ----------------------------<  47<LL>  3.5   |  26  |  0.80    Ca    8.6      15 Mar 2023 16:42  Phos  3.6     03-15  Mg     1.8     03-15    TPro  5.8<L>  /  Alb  2.8<L>  /  TBili  0.2  /  DBili  x   /  AST  47<H>  /  ALT  43  /  AlkPhos  139<H>  03-15    PT/INR - ( 15 Mar 2023 09:11 )   PT: 12.1 sec;   INR: 1.02          PTT - ( 15 Mar 2023 09:11 )  PTT:45.7 sec    CAPILLARY BLOOD GLUCOSE      POCT Blood Glucose.: 90 mg/dL (16 Mar 2023 02:35)      RADIOLOGY & ADDITIONAL TESTS: Reviewed.

## 2023-03-16 NOTE — PROGRESS NOTE ADULT - PROBLEM SELECTOR PLAN 5
A1c 6 during this admission    - f/u outpt Reports hx of HTN, SBP 160s on arrival. Reports being Rx'd anti-HTN meds in the past but does not known which and hasn't been taking. Pt is currently normotensive. On 3/16 on [facility], BP systolic of 139/62 with HR of 64.   Plan:   - Continue to monitor   - TSH normal at 1.25 with T4 of 0.966.

## 2023-03-16 NOTE — PROGRESS NOTE ADULT - ASSESSMENT
73 y/o undomiciled Male current smoker (1ppd x50+yrs), w/ PSHx of unspecified GI surgery reportedly for GIB, and PMHx of HTN who was initially BIBEMS for dizziness, inability to walk and AMS, was found to be profoundly hypothermic and is now admitted to MICU. Hospital course complicated by PE, started on full dose lovenox. Now stable for transfer to [facility], [place].    NEUROLOGY  #AMS/Encephalopathy  Initially minimally interactive only nodding head, now returned to baselined MS (A+Ox3) and does remember events leading up to 911 being called. CT head neg for trauma/acute changes. Likely etiology hypothermia. Upon arrival to MICU and rewarmed, now mentating at baseline.   - Treatment of hypothermia as below  - UTox ordered, F/U    CARDIOVASCULAR  #Hypotension  Likely etiology distributive shock 2/2 vasodilation from active rewarming. Lactate WNL.  - Now off levophed gtt     #Bradycardia  Initially p/w HR 31 w/ Coll[place] waves on EKG. Likely etiology hypothermia. HR improved upon rewarming.     #r/o CHF  Denies hx of CHF but reports worsening b/l LE pitting edema  - Echo ordered, f/u    #r/o DVT  Given finding of PE and worsening b/l LE pitting edema  - LE duplex ordered, f/u    RESPIRATORY  #Pulmonary embolism  No known or prior hx of PE. Undomiciled but mobile. No known hx of Afib, has been in NSR while admitted on telemetry.  - CT chest showing RLL segmental PE w/o evidence of RV dysfunction  - Started on therapeutic dose Lovenox --> will need to determine outpatient regimen if pt accepts shelter  - Echo ordered as above, f/u  - Will need further workup regarding etiology of PE    #Active smoker  - Nicotine patch q24hrs PRN ordered (1ppd x50+ years)    RENAL  No active issues, renal function at baseline  -Ashley placed in ED 3/15 for active rewarming bladder irrigation  - Removed in MICU prior to stepdown   - F/U TOV    ENDOCRINE  #Hypothermia  P/w severe hypothermia w/ T81.5F rectally. TSH WNL. Most likely etiology environmental exposure  - c/w Active Internal Rewarming Protocol including bare hugger, warmed IVF and bladder irrigation (goal to increase T 2 degrees Celcius/hour until goal T 96.0F)    #pre-DM  A1c 6.0 this admission      GASTROINTESTINAL  #Remote hx of GIB  Pt reports hx of "gastric surgery for GI bleed" in 2017 which he believes was @[facility] but no documentation available. Reports previously taking Omeprazole but has not taken any Rx's in some time  Hb 10.9 on admission. No evidence of GIB at this time, pt denies recent melena/hematochezia/hematemesis  - Started Pantoprazole 40mg PO QD      ID  #Leukopenia  P/w WBC 2.19 w/o shift. Unknown etiology. Non-toxic appearing, hypothermia, no clear evidence of infection.  Urine leg neg, RVP neg. S/p 1 dose of Vanco and Zosyn in the ED, not continued  - Blood and Urine Cx sent 3/15, f/u  - UA, Urine strep, and MRSA swab sent, f/u  - Monitor off abx for now      HEMATOLOGY/ONCOLOGY  #Microcytic anemia  P/w Hb 10.9 and MCV 68.9  - Iron studies sent, f/u  - Maintain active T&S (next due 3/19)  - Transfused for Hb <7    PROPHYLACTIC MEASURES  FLUIDS: None  ELECTROLYTES: Mg<2, K<4  DIET: DASH  VTE PPX: Lovenox (treatment dose for PE)  CODE: FULL  DISPO: MICU 73 y/o undomiciled Male current smoker (1ppd x50+yrs), w/ PSHx of unspecified GI surgery reportedly for GIB, and PMHx of HTN who was initially BIBEMS for dizziness, inability to walk and AMS, was found to be profoundly hypothermic, admitted to MICU for active rewarming protocol and levophed gtt for vasodilation while rewarming, now normothermic, AAOx3, off levophed gtt. Hospital course complicated by PE, started on full dose lovenox. Stepped down to RM[facility] for further management.     NEUROLOGY  #AMS/Encephalopathy  Initially minimally interactive only nodding head, now returned to baselined MS (A+Ox3) and does remember events leading up to 911 being called. CT head neg for trauma/acute changes. Likely etiology hypothermia. Upon arrival to MICU and rewarmed, now mentating at baseline.   - Treatment of hypothermia as below  - UTox ordered, F/U    CARDIOVASCULAR  #Hypotension  Likely etiology distributive shock 2/2 vasodilation from active rewarming. Lactate WNL.  - Now off levophed gtt     #Bradycardia  Initially p/w HR 31 w/ Coll[place] waves on EKG. Likely etiology hypothermia. HR improved upon rewarming.     #r/o CHF  Denies hx of CHF but reports worsening b/l LE pitting edema  - Echo ordered, f/u    #r/o DVT  Given finding of PE and worsening b/l LE pitting edema  - LE duplex ordered, f/u    RESPIRATORY  #Pulmonary embolism  No known or prior hx of PE. Undomiciled but mobile. No known hx of Afib, has been in NSR while admitted on telemetry.  - CT chest showing RLL segmental PE w/o evidence of RV dysfunction  - Started on therapeutic dose Lovenox --> will need to determine outpatient regimen if pt accepts shelter  - Echo ordered as above, f/u  - Will need further workup regarding etiology of PE    #Active smoker  - Nicotine patch q24hrs PRN ordered (1ppd x50+ years)    RENAL  No active issues, renal function at baseline  -Ashley placed in ED 3/15 for active rewarming bladder irrigation  - Removed in MICU prior to stepdown   - F/U TOV    ENDOCRINE  #Hypothermia  P/w severe hypothermia w/ T81.5F rectally. TSH WNL. Most likely etiology environmental exposure  - c/w Active Internal Rewarming Protocol including bare hugger, warmed IVF and bladder irrigation (goal to increase T 2 degrees Celcius/hour until goal T 96.0F)    #pre-DM  A1c 6.0 this admission      GASTROINTESTINAL  #Remote hx of GIB  Pt reports hx of "gastric surgery for GI bleed" in 2017 which he believes was @[facility] but no documentation available. Reports previously taking Omeprazole but has not taken any Rx's in some time  Hb 10.9 on admission. No evidence of GIB at this time, pt denies recent melena/hematochezia/hematemesis  - Started Pantoprazole 40mg PO QD      ID  #Leukopenia  P/w WBC 2.19 w/o shift. Unknown etiology. Non-toxic appearing, hypothermia, no clear evidence of infection.  Urine leg neg, RVP neg. S/p 1 dose of Vanco and Zosyn in the ED, not continued  - Blood and Urine Cx sent 3/15, f/u  - UA, Urine strep, and MRSA swab sent, f/u  - Monitor off abx for now      HEMATOLOGY/ONCOLOGY  #Microcytic anemia  P/w Hb 10.9 and MCV 68.9  - Iron studies sent, f/u  - Maintain active T&S (next due 3/19)  - Transfused for Hb <7    PROPHYLACTIC MEASURES  FLUIDS: None  ELECTROLYTES: Mg<2, K<4  DIET: DASH  VTE PPX: Lovenox (treatment dose for PE)  CODE: FULL  DISPO: MICU 73 y/o undomiciled Male current smoker (1ppd x50+yrs), w/ PSHx of unspecified GI surgery reportedly for GIB, and PMHx of HTN who was initially BIBEMS for dizziness, inability to walk and AMS, was found to be profoundly hypothermic, admitted to MICU for active rewarming protocol and levophed gtt for vasodilation while rewarming, now normothermic, AAOx3, off levophed gtt. Hospital course complicated by PE, started on full dose lovenox. Stepped down to [facility] for further management.

## 2023-03-16 NOTE — PROGRESS NOTE ADULT - PROBLEM SELECTOR PLAN 2
RESOLVED    Initially minimally interactive only nodding head, now returned to baselined MS (A+Ox3) and does remember events leading up to 911 being called. CT head neg for trauma/acute changes. Likely etiology hypothermia    - Utox neg P/w WBC 2.19 w/o shift. Unknown etiology. Non-toxic appearing, hypothermia, no clear evidence of infection. Urine leg neg, RVP neg. S/p 1 dose of Vanco and Zosyn in the ED, not continued    - Urine Strep, legionella neg  - BCx NGTD, f/u final  - f/u UA  - f/u MRSA swab   - Monitor off abx for now

## 2023-03-16 NOTE — PROGRESS NOTE ADULT - PROBLEM SELECTOR PLAN 1
3/15 CT chest showing RLL segmental PE w/o evidence of RV dysfunction.    - c/w Lovenox 70mg BID (3/15- )   - f/u TTE   - f/u LE Dopplers   - poor candidate for Coumadin 2/2 hx of medication non-compliance and undomiciled 3/15 CT chest showing RLL segmental PE w/o evidence of RV dysfunction. B/l lower extremity duplex studies showing no evidence of DVTs bilaterally.   Plan:   - c/w Lovenox 70mg BID (3/15- )   - f/u TTE to evaluate for R heart strain   - poor candidate for Coumadin 2/2 hx of medication non-compliance and undomiciled

## 2023-03-16 NOTE — PROGRESS NOTE ADULT - PROBLEM SELECTOR PLAN 3
Hgb 10.9 on admission , MCV 68.9. Currently no signs of active bleeding (no hematochezia, melena, hemoptysis, hematuria)    - f/u iron panel   - trend CBC  - maintain active T&S (next due 3/19)   - transfuse if Hgb <7 Pt with BUN/Cr of 10/0.80 on 3/15 in afternoon with subsequent rise to 14/1.15 on 3/16. Pt was previously in distributive shock, was stabilized with Levophed ggt @ 0.05mcg/kg/min run and d/c @ 1500 on 3/15/23. Suspecting abrupt rise secondary to hypovolemia/hypoperfusion. Urine studies obtained 3/16 showing Urine sodium of 63 and UrCrea of 36. FeNa calculated to 1.4% suggestive of intrinsic etiology. Suspecting source to be from frequent episodes of hypoperfusion causing ischemic ATN   Plan:   - Continue to trend CMPs daily   - Avoid nephrotoxic agents

## 2023-03-16 NOTE — PROGRESS NOTE ADULT - PROBLEM SELECTOR PLAN 2
P/w WBC 2.19 w/o shift. Unknown etiology. Non-toxic appearing, hypothermia, no clear evidence of infection. Urine leg neg, RVP neg. S/p 1 dose of Vanco and Zosyn in the ED, not continued    - Urine Strep, legionella neg  - BCx NGTD, f/u final  - f/u UA  - f/u MRSA swab   - Monitor off abx for now P/w WBC 2.19 w/o shift. Unknown etiology. Non-toxic appearing, hypothermia, no clear evidence of infection. Urine leg neg, RVP neg. S/p 1 dose of Vanco 1g and Zosyn 3.375 in the ED, not continued.   Plan:   - Urine Strep, legionella neg  - BCx NGTD, f/u final  - UA negative 3/16   - MRSA swab negative 3/16   - Monitor off abx for now

## 2023-03-16 NOTE — PROGRESS NOTE ADULT - PROBLEM SELECTOR PLAN 7
Pt reports hx of "gastric surgery for GI bleed" in 2017 which he believes was @[facility] but no documentation available. Reports previously taking Omeprazole but has not taken any Rx's in some time. Hb 10.9 on admission.  No evidence of GIB at this time, pt denies recent melena/hematochezia/hematemesis    - c/w Pantoprazole 40mg PO QD RESOLVED    Initially minimally interactive only nodding head, now returned to baselined MS (A+Ox3) and does remember events leading up to 911 being called. CT head neg for trauma/acute changes. Likely etiology hypothermia    - Utox neg

## 2023-03-16 NOTE — PROGRESS NOTE ADULT - PROBLEM SELECTOR PLAN 7
RESOLVED    Initially minimally interactive only nodding head, now returned to baselined MS (A+Ox3) and does remember events leading up to 911 being called. CT head neg for trauma/acute changes. Likely etiology hypothermia    - Utox neg RESOLVED   p/w severe hypothermia w/ T81.5F rectally. TSH WNL. Most likely etiology environmental exposure  - s/p Active Internal Rewarming Protocol including bairhugger, warmed IVF and bladder irrigation (goal to increase T 2 degrees Celcius/hour until goal T 96.0F) - now normothermic      #Hypotension - RESOLVED   Likely etiology distributive shock 2/2 vasodilation from active rewarming. Lactate WNL.  - s/p active rewarming protocol, (goal temp 96F) and levophed gtt; off levophed gtt since 3/15 3pm     #Bradycardia - RESOLVED   Initially p/w HR 31 w/ Coll[place] waves on EKG. Likely etiology hypothermia  - HR 60s-70s Pt reports hx of "gastric surgery for GI bleed" in 2017 which he believes was @[facility] but no documentation available. Reports previously taking Omeprazole but has not taken any Rx's in some time. Hb 10.9 on admission.  No evidence of GIB at this time, pt denies recent melena/hematochezia/hematemesis. Per note at [facility] in 2016, pt perhaps saw surgeon Robin Bland however no documentation found.   Plan:   - c/w Pantoprazole 40mg PO QD

## 2023-03-16 NOTE — PROGRESS NOTE ADULT - PROBLEM SELECTOR PLAN 3
3/15 CT chest showing RLL segmental PE w/o evidence of RV dysfunction.    - c/w Lovenox 70mg BID (3/15- )   - f/u TTE   - f/u LE Dopplers   - poor candidate for Coumadin 2/2 hx of medication non-compliance and undomiciled Hgb 10.9 on admission , MCV 68.9. Currently no signs of active bleeding (no hematochezia, melena, hemoptysis, hematuria)    - f/u iron panel   - trend CBC  - maintain active T&S (next due 3/19)   - transfuse if Hgb <7

## 2023-03-16 NOTE — PROGRESS NOTE ADULT - PROBLEM SELECTOR PLAN 8
Pt reports hx of "gastric surgery for GI bleed" in 2017 which he believes was @[facility] but no documentation available. Reports previously taking Omeprazole but has not taken any Rx's in some time. Hb 10.9 on admission.  No evidence of GIB at this time, pt denies recent melena/hematochezia/hematemesis    - c/w Pantoprazole 40mg PO QD RESOLVED    Initially minimally interactive only nodding head, now returned to baselined MS (A+Ox3) and does remember events leading up to 911 being called. CT head neg for trauma/acute changes. Likely etiology hypothermia    - Utox neg RESOLVED   p/w severe hypothermia w/ T81.5F rectally. TSH WNL. Most likely etiology environmental exposure  - s/p Active Internal Rewarming Protocol including bairhugger, warmed IVF and bladder irrigation (goal to increase T 2 degrees Celcius/hour until goal T 96.0F) - now normothermic      #Hypotension - RESOLVED   Likely etiology distributive shock 2/2 vasodilation from active rewarming. Lactate WNL.  - s/p active rewarming protocol, (goal temp 96F) and levophed gtt; off levophed gtt since 3/15 3pm     #Bradycardia - RESOLVED   Initially p/w HR 31 w/ Collado waves on EKG. Likely etiology hypothermia  - HR 60s-70s

## 2023-03-16 NOTE — PROGRESS NOTE ADULT - PROBLEM SELECTOR PLAN 1
RESOLVED   p/w severe hypothermia w/ T81.5F rectally. TSH WNL. Most likely etiology environmental exposure  - s/p Active Internal Rewarming Protocol including bairhugger, warmed IVF and bladder irrigation (goal to increase T 2 degrees Celcius/hour until goal T 96.0F) - now normothermic      #Hypotension - RESOLVED   Likely etiology distributive shock 2/2 vasodilation from active rewarming. Lactate WNL.  - s/p active rewarming protocol, (goal temp 96F) and levophed gtt; off levophed gtt since 3/15 3pm     #Bradycardia - RESOLVED   Initially p/w HR 31 w/ Collado waves on EKG. Likely etiology hypothermia  - HR 60s-70s 3/15 CT chest showing RLL segmental PE w/o evidence of RV dysfunction.    - c/w Lovenox 70mg BID (3/15- )   - f/u TTE   - f/u LE Dopplers   - poor candidate for Coumadin 2/2 hx of medication non-compliance and undomiciled

## 2023-03-16 NOTE — PROGRESS NOTE ADULT - PROBLEM SELECTOR PLAN 4
A1c 6 during this admission    - f/u outpt Reports hx of HTN, SBP 160s on arrival. Reports being Rx'd anti-HTN meds in the past but does not known which and hasn't been taking    - normotensive currently; continue to monitor BP

## 2023-03-16 NOTE — PROGRESS NOTE ADULT - PROBLEM SELECTOR PLAN 9
FLUIDS: None  ELECTROLYTES: Mg<2, K<4  DIET: DASH  VTE PPX: Lovenox (treatment dose for PE)  CODE: FULL  DISPO: MICU > RMF

## 2023-03-16 NOTE — PROGRESS NOTE ADULT - ASSESSMENT
73 y/o undomiciled Male current smoker (1ppd x50+yrs), w/ PSHx of unspecified GI surgery reportedly for GIB, and PMHx of HTN who was initially BIBEMS for dizziness, inability to walk and AMS, was found to be profoundly hypothermic, admitted to MICU for active rewarming protocol and levophed gtt for vasodilation while rewarming, now normothermic, AAOx3, off levophed gtt. Hospital course complicated by PE, started on full dose lovenox. Stepped down to RMF for further management.

## 2023-03-16 NOTE — PROGRESS NOTE ADULT - SUBJECTIVE AND OBJECTIVE BOX
*************STEPDOWN FROM MICU TO RMF ******************************    HOSPITAL COURSE:  73 YO undomiciled M with PMHx current active smoker (1PPD x 50 years), PSHx GI surgery, HTN BIBEMS for dizziness and confusion found to be hypothermic to 81.5F. Patient admitted to MICU for active rewarming protocol, levophed gtt for vasodilation while rewarming, and strict UOP monitoring s/p Ashley in ED. Upon arrival to MICU, patient warmed to 95-96, AOx3, awake/alert, mentating well, passed bedside dysphagia. Hospital course complicated by PE, started on full dose lovenox. Patient remained off levophed gtt and stable for stepdown to RMF.    SUBJECTIVE:  Pt seen and examined at bedside. Has no complaints and denies pain anywhere currently.    VITAL SIGNS:  Vital Signs Last 24 Hrs  T(C): 36.3 (15 Mar 2023 21:23), Max: 36.3 (15 Mar 2023 21:23)  T(F): 97.3 (15 Mar 2023 21:23), Max: 97.3 (15 Mar 2023 21:23)  HR: 60 (15 Mar 2023 23:00) (33 - 81)  BP: 121/57 (15 Mar 2023 23:00) (79/42 - 169/75)  BP(mean): 82 (15 Mar 2023 23:00) (55 - 100)  RR: 15 (15 Mar 2023 23:00) (12 - 31)  SpO2: 100% (15 Mar 2023 23:00) (93% - 100%)    Parameters below as of 15 Mar 2023 23:00  Patient On (Oxygen Delivery Method): room air        PHYSICAL EXAM:  General: resting in bed in NAD  HEENT: NC/AT; PERRL; exophthalmos   Neck: supple; no JVD  Cardiac: RRR; +S1/S2  Pulm: CTA B/L; no W/R/R  GI: soft, NT/ND, +BS  Extremities: pitting edema to b/l LEs, no clubbing or cyanosis  Vasc: 2+ radial, DP pulses B/L  Neuro: slow to respond, AAOx3; no focal deficits    MEDICATIONS:  MEDICATIONS  (STANDING):  chlorhexidine 4% Liquid 1 Application(s) Topical <User Schedule>  enoxaparin Injectable 70 milliGRAM(s) SubCutaneous every 12 hours  influenza  Vaccine (HIGH DOSE) 0.7 milliLiter(s) IntraMuscular once  pantoprazole    Tablet 40 milliGRAM(s) Oral before breakfast    MEDICATIONS  (PRN):  nicotine - 21 mG/24Hr(s) Patch 1 Patch Transdermal daily PRN Cravings      ALLERGIES:  Allergies    No Known Allergies    Intolerances        LABS:                        10.9   2.19  )-----------( 271      ( 15 Mar 2023 09:11 )             36.1     03-15    143  |  109<H>  |  10  ----------------------------<  47<LL>  3.5   |  26  |  0.80    Ca    8.6      15 Mar 2023 16:42  Phos  3.6     03-15  Mg     1.8     03-15    TPro  5.8<L>  /  Alb  2.8<L>  /  TBili  0.2  /  DBili  x   /  AST  47<H>  /  ALT  43  /  AlkPhos  139<H>  03-15    PT/INR - ( 15 Mar 2023 09:11 )   PT: 12.1 sec;   INR: 1.02          PTT - ( 15 Mar 2023 09:11 )  PTT:45.7 sec    RADIOLOGY & ADDITIONAL TESTS: Reviewed. *************STEPDOWN FROM MICU TO RMF ******************************    HOSPITAL COURSE:  71 y/o undomiciled M with PMHx current active smoker (1PPD x 50 years), PSHx GI surgery, HTN, BIBEMS for dizziness, difficulty with ambulation and confusion, found to be hypothermic to 81.5F (rectal), admitted to MICU for active rewarming protocol (warmed IVF, warmed bladder irrigation, bairhugger), levophed gtt for vasodilation while rewarming, and strict UOP monitoring s/p Ashley in ED. Upon arrival to MICU, patient warmed to 95-96F, AOx3, awake/alert, mentating well. Hospital course complicated by RLL segmental PE (no R heart strain), started on full dose lovenox. Off levophed gtt since 3pm, and pt is medically stable for stepdown to RMF for further management.     SUBJECTIVE:  Pt seen and examined at bedside. States he has had a tremor to b/l arms for the past week. Also reports b/l leg swelling for 1.5 weeks. Denies alcohol or other recreational drug use; states he last drank alcohol 4 years ago. Otherwise, pt has no complaints and denies pain anywhere currently.     VITAL SIGNS:  Vital Signs Last 24 Hrs  T(C): 36.3 (15 Mar 2023 21:23), Max: 36.3 (15 Mar 2023 21:23)  T(F): 97.3 (15 Mar 2023 21:23), Max: 97.3 (15 Mar 2023 21:23)  HR: 60 (15 Mar 2023 23:00) (33 - 81)  BP: 121/57 (15 Mar 2023 23:00) (79/42 - 169/75)  BP(mean): 82 (15 Mar 2023 23:00) (55 - 100)  RR: 15 (15 Mar 2023 23:00) (12 - 31)  SpO2: 100% (15 Mar 2023 23:00) (93% - 100%)    Parameters below as of 15 Mar 2023 23:00  Patient On (Oxygen Delivery Method): room air        PHYSICAL EXAM:  General: resting in bed in NAD  HEENT: NC/AT; PERRL; exophthalmos   Neck: supple; no JVD  Cardiac: RRR; +S1/S2  Pulm: CTA B/L; no W/R/R  GI: soft, NT/ND, +BS  Extremities: pitting edema to b/l LEs, no clubbing or cyanosis  Vasc: 2+ radial, DP pulses B/L  Neuro: AAOx3; tremor to b/l upper extremities, no focal deficits    MEDICATIONS:  MEDICATIONS  (STANDING):  chlorhexidine 4% Liquid 1 Application(s) Topical <User Schedule>  enoxaparin Injectable 70 milliGRAM(s) SubCutaneous every 12 hours  influenza  Vaccine (HIGH DOSE) 0.7 milliLiter(s) IntraMuscular once  pantoprazole    Tablet 40 milliGRAM(s) Oral before breakfast    MEDICATIONS  (PRN):  nicotine - 21 mG/24Hr(s) Patch 1 Patch Transdermal daily PRN Cravings      ALLERGIES:  Allergies    No Known Allergies    Intolerances        LABS:                        10.9   2.19  )-----------( 271      ( 15 Mar 2023 09:11 )             36.1     03-15    143  |  109<H>  |  10  ----------------------------<  47<LL>  3.5   |  26  |  0.80    Ca    8.6      15 Mar 2023 16:42  Phos  3.6     03-15  Mg     1.8     03-15    TPro  5.8<L>  /  Alb  2.8<L>  /  TBili  0.2  /  DBili  x   /  AST  47<H>  /  ALT  43  /  AlkPhos  139<H>  03-15    PT/INR - ( 15 Mar 2023 09:11 )   PT: 12.1 sec;   INR: 1.02          PTT - ( 15 Mar 2023 09:11 )  PTT:45.7 sec    RADIOLOGY & ADDITIONAL TESTS: Reviewed.

## 2023-03-16 NOTE — PROGRESS NOTE ADULT - PROBLEM SELECTOR PLAN 6
RESOLVED   p/w severe hypothermia w/ T81.5F rectally. TSH WNL. Most likely etiology environmental exposure  - s/p Active Internal Rewarming Protocol including bairhugger, warmed IVF and bladder irrigation (goal to increase T 2 degrees Celcius/hour until goal T 96.0F) - now normothermic      #Hypotension - RESOLVED   Likely etiology distributive shock 2/2 vasodilation from active rewarming. Lactate WNL.  - s/p active rewarming protocol, (goal temp 96F) and levophed gtt; off levophed gtt since 3/15 3pm     #Bradycardia - RESOLVED   Initially p/w HR 31 w/ Collado waves on EKG. Likely etiology hypothermia  - HR 60s-70s Pt reports hx of "gastric surgery for GI bleed" in 2017 which he believes was @[facility] but no documentation available. Reports previously taking Omeprazole but has not taken any Rx's in some time. Hb 10.9 on admission.  No evidence of GIB at this time, pt denies recent melena/hematochezia/hematemesis    - c/w Pantoprazole 40mg PO QD A1c 6 during this admission  Plan:   - f/u outpt

## 2023-03-16 NOTE — PROGRESS NOTE ADULT - PROBLEM SELECTOR PLAN 9
FLUIDS: None  ELECTROLYTES: Mg<2, K<4  DIET: DASH  VTE PPX: Lovenox (treatment dose for PE)  CODE: FULL  DISPO: MICU > RMF RESOLVED    Initially minimally interactive only nodding head, now returned to baselined MS (A+Ox3) and does remember events leading up to 911 being called. CT head neg for trauma/acute changes. Likely etiology hypothermia. Currently mentating well, AOx3.   Plan:   - Utox neg

## 2023-03-17 ENCOUNTER — TRANSCRIPTION ENCOUNTER (OUTPATIENT)
Age: 73
End: 2023-03-17

## 2023-03-17 LAB
ALBUMIN SERPL ELPH-MCNC: 2.9 G/DL — LOW (ref 3.3–5)
ALP SERPL-CCNC: 190 U/L — HIGH (ref 40–120)
ALT FLD-CCNC: 52 U/L — HIGH (ref 10–45)
ANION GAP SERPL CALC-SCNC: 9 MMOL/L — SIGNIFICANT CHANGE UP (ref 5–17)
AST SERPL-CCNC: 71 U/L — HIGH (ref 10–40)
BASOPHILS # BLD AUTO: 0.02 K/UL — SIGNIFICANT CHANGE UP (ref 0–0.2)
BASOPHILS NFR BLD AUTO: 0.5 % — SIGNIFICANT CHANGE UP (ref 0–2)
BILIRUB SERPL-MCNC: 0.2 MG/DL — SIGNIFICANT CHANGE UP (ref 0.2–1.2)
BLD GP AB SCN SERPL QL: NEGATIVE — SIGNIFICANT CHANGE UP
BUN SERPL-MCNC: 16 MG/DL — SIGNIFICANT CHANGE UP (ref 7–23)
CALCIUM SERPL-MCNC: 9.1 MG/DL — SIGNIFICANT CHANGE UP (ref 8.4–10.5)
CHLORIDE SERPL-SCNC: 109 MMOL/L — HIGH (ref 96–108)
CO2 SERPL-SCNC: 27 MMOL/L — SIGNIFICANT CHANGE UP (ref 22–31)
CORTIS AM PEAK SERPL-MCNC: 5.44 UG/DL — LOW (ref 6.02–18.4)
CREAT SERPL-MCNC: 1.25 MG/DL — SIGNIFICANT CHANGE UP (ref 0.5–1.3)
EGFR: 61 ML/MIN/1.73M2 — SIGNIFICANT CHANGE UP
EOSINOPHIL # BLD AUTO: 0.05 K/UL — SIGNIFICANT CHANGE UP (ref 0–0.5)
EOSINOPHIL NFR BLD AUTO: 1.3 % — SIGNIFICANT CHANGE UP (ref 0–6)
GLUCOSE BLDC GLUCOMTR-MCNC: 197 MG/DL — HIGH (ref 70–99)
GLUCOSE BLDC GLUCOMTR-MCNC: 82 MG/DL — SIGNIFICANT CHANGE UP (ref 70–99)
GLUCOSE BLDC GLUCOMTR-MCNC: 95 MG/DL — SIGNIFICANT CHANGE UP (ref 70–99)
GLUCOSE BLDC GLUCOMTR-MCNC: 97 MG/DL — SIGNIFICANT CHANGE UP (ref 70–99)
GLUCOSE BLDC GLUCOMTR-MCNC: 98 MG/DL — SIGNIFICANT CHANGE UP (ref 70–99)
GLUCOSE SERPL-MCNC: 65 MG/DL — LOW (ref 70–99)
HCT VFR BLD CALC: 29.2 % — LOW (ref 39–50)
HGB BLD-MCNC: 9 G/DL — LOW (ref 13–17)
IMM GRANULOCYTES NFR BLD AUTO: 0.3 % — SIGNIFICANT CHANGE UP (ref 0–0.9)
LYMPHOCYTES # BLD AUTO: 1.29 K/UL — SIGNIFICANT CHANGE UP (ref 1–3.3)
LYMPHOCYTES # BLD AUTO: 32.3 % — SIGNIFICANT CHANGE UP (ref 13–44)
MAGNESIUM SERPL-MCNC: 2 MG/DL — SIGNIFICANT CHANGE UP (ref 1.6–2.6)
MCHC RBC-ENTMCNC: 20.7 PG — LOW (ref 27–34)
MCHC RBC-ENTMCNC: 30.8 GM/DL — LOW (ref 32–36)
MCV RBC AUTO: 67.3 FL — LOW (ref 80–100)
MONOCYTES # BLD AUTO: 0.68 K/UL — SIGNIFICANT CHANGE UP (ref 0–0.9)
MONOCYTES NFR BLD AUTO: 17 % — HIGH (ref 2–14)
NEUTROPHILS # BLD AUTO: 1.95 K/UL — SIGNIFICANT CHANGE UP (ref 1.8–7.4)
NEUTROPHILS NFR BLD AUTO: 48.6 % — SIGNIFICANT CHANGE UP (ref 43–77)
NRBC # BLD: 0 /100 WBCS — SIGNIFICANT CHANGE UP (ref 0–0)
PHOSPHATE SERPL-MCNC: 3 MG/DL — SIGNIFICANT CHANGE UP (ref 2.5–4.5)
PLATELET # BLD AUTO: 221 K/UL — SIGNIFICANT CHANGE UP (ref 150–400)
POTASSIUM SERPL-MCNC: 4.1 MMOL/L — SIGNIFICANT CHANGE UP (ref 3.5–5.3)
POTASSIUM SERPL-SCNC: 4.1 MMOL/L — SIGNIFICANT CHANGE UP (ref 3.5–5.3)
PROT SERPL-MCNC: 6.2 G/DL — SIGNIFICANT CHANGE UP (ref 6–8.3)
RBC # BLD: 4.34 M/UL — SIGNIFICANT CHANGE UP (ref 4.2–5.8)
RBC # FLD: 17.3 % — HIGH (ref 10.3–14.5)
RH IG SCN BLD-IMP: NEGATIVE — SIGNIFICANT CHANGE UP
SODIUM SERPL-SCNC: 145 MMOL/L — SIGNIFICANT CHANGE UP (ref 135–145)
WBC # BLD: 4 K/UL — SIGNIFICANT CHANGE UP (ref 3.8–10.5)
WBC # FLD AUTO: 4 K/UL — SIGNIFICANT CHANGE UP (ref 3.8–10.5)

## 2023-03-17 PROCEDURE — 76770 US EXAM ABDO BACK WALL COMP: CPT | Mod: 26

## 2023-03-17 PROCEDURE — 99223 1ST HOSP IP/OBS HIGH 75: CPT

## 2023-03-17 PROCEDURE — 99232 SBSQ HOSP IP/OBS MODERATE 35: CPT | Mod: GC

## 2023-03-17 RX ORDER — APIXABAN 2.5 MG/1
10 TABLET, FILM COATED ORAL EVERY 12 HOURS
Refills: 0 | Status: DISCONTINUED | OUTPATIENT
Start: 2023-03-17 | End: 2023-03-20

## 2023-03-17 RX ORDER — ARIPIPRAZOLE 15 MG/1
5 TABLET ORAL
Refills: 0 | Status: DISCONTINUED | OUTPATIENT
Start: 2023-03-18 | End: 2023-03-18

## 2023-03-17 RX ADMIN — Medication 1 PATCH: at 12:19

## 2023-03-17 RX ADMIN — ENOXAPARIN SODIUM 70 MILLIGRAM(S): 100 INJECTION SUBCUTANEOUS at 05:50

## 2023-03-17 RX ADMIN — Medication 1 PATCH: at 19:00

## 2023-03-17 RX ADMIN — Medication 1 PATCH: at 06:44

## 2023-03-17 RX ADMIN — APIXABAN 10 MILLIGRAM(S): 2.5 TABLET, FILM COATED ORAL at 18:23

## 2023-03-17 RX ADMIN — PANTOPRAZOLE SODIUM 40 MILLIGRAM(S): 20 TABLET, DELAYED RELEASE ORAL at 05:50

## 2023-03-17 RX ADMIN — Medication 1 PATCH: at 14:40

## 2023-03-17 NOTE — DISCHARGE NOTE PROVIDER - CARE PROVIDER_API CALL
Zahraa Ritter)  Internal Medicine  [address]  [place], [place] [address]  Phone: [phone]  Fax: ()-  Follow Up Time: 1 month   Zahraa Ritter)  Internal Medicine  [address], [place] [address]  Phone: [phone]  Fax: ()-  Follow Up Time: 2 weeks    Chicho Pinon)  EndocrinologyMetabDiabetes; Internal Medicine  [address] [address]  Phone: [phone]  Fax: [phone]  Follow Up Time: 2 weeks   Zahraa Ritter)  Internal Medicine  [address] [place] [address]  Phone: [phone]  Fax: ()-  Scheduled Appointment: 03/30/2023 12:00 PM

## 2023-03-17 NOTE — DISCHARGE NOTE PROVIDER - NSDCFUSCHEDAPPT_GEN_ALL_CORE_FT
[facility]  INTMED [address]  Scheduled Appointment: 03/30/2023

## 2023-03-17 NOTE — PROGRESS NOTE ADULT - PROBLEM SELECTOR PLAN 2
P/w WBC 2.19 w/o shift. Unknown etiology. Non-toxic appearing, hypothermia, no clear evidence of infection. Urine leg neg, RVP neg. S/p 1 dose of Vanco 1g and Zosyn 3.375 in the ED, not continued.   Plan:   - Urine Strep, legionella neg  - BCx NGTD, f/u final  - UA negative 3/16   - MRSA swab negative 3/16   - Monitor off abx for now P/w WBC 2.19 w/o shift. Unknown etiology. Non-toxic appearing, hypothermia, no clear evidence of infection. Urine leg neg, RVP neg. S/p 1 dose of Vanco 1g and Zosyn 3.375 in the ED, not continued. Urine Strep, legionella neg. MRSA negative. UA negative. Afebrile, VSS. Pt improving after > 48hr off abx.   Plan:   - Leukopenia closed on 3/17. Not likely to be d/2 infectious etiology.   - Continue to monitor off abx

## 2023-03-17 NOTE — PROGRESS NOTE ADULT - ASSESSMENT
71 y/o undomiciled Male current smoker (1ppd x50+yrs), w/ PSHx of unspecified GI surgery reportedly for GIB, and PMHx of HTN who was initially BIBEMS for dizziness, inability to walk and AMS, was found to be profoundly hypothermic, admitted to MICU for active rewarming protocol and levophed gtt for vasodilation while rewarming, now normothermic, AAOx3, off levophed gtt. Hospital course complicated by PE, started on full dose lovenox. Stepped down to RMF for further management.

## 2023-03-17 NOTE — BH CONSULTATION LIAISON ASSESSMENT NOTE - RISK ASSESSMENT
low acute suicide risk, per lack of any known SI, and future oriented content.  There is risk of harm rhonda accidental due to impaired ability to care for himself, will be improved with medication and f/u, dispo planning

## 2023-03-17 NOTE — BH CONSULTATION LIAISON ASSESSMENT NOTE - CURRENT MEDICATION
MEDICATIONS  (STANDING):  apixaban 10 milliGRAM(s) Oral every 12 hours  influenza  Vaccine (HIGH DOSE) 0.7 milliLiter(s) IntraMuscular once  nicotine - 21 mG/24Hr(s) Patch 1 Patch Transdermal daily  pantoprazole    Tablet 40 milliGRAM(s) Oral before breakfast    MEDICATIONS  (PRN):

## 2023-03-17 NOTE — DISCHARGE NOTE PROVIDER - CARE PROVIDERS DIRECT ADDRESSES
,[email or web address] ,[email or web address]

## 2023-03-17 NOTE — DISCHARGE NOTE PROVIDER - PROVIDER TOKENS
PROVIDER:[TOKEN:[[id number]],FOLLOWUP:[1 month]] PROVIDER:[TOKEN:[[id number]],FOLLOWUP:[2 weeks]],PROVIDER:[TOKEN:[[id number]],FOLLOWUP:[2 weeks]] PROVIDER:[TOKEN:[[id number]],SCHEDULEDAPPT:[03/30/2023],SCHEDULEDAPPTTIME:[12:00 PM]]

## 2023-03-17 NOTE — DISCHARGE NOTE PROVIDER - HOSPITAL COURSE
#Discharge: do not delete    71 y/o undomiciled Male current smoker (1ppd x50+yrs), w/ PSHx of unspecified GI surgery reportedly for GIB, and PMHx of HTN who was initially BIBEMS for dizziness, inability to walk and AMS, was found to be profoundly hypothermic, admitted to MICU for active rewarming protocol and levophed gtt for vasodilation while rewarming, now normothermic, AAOx3, off levophed gtt. Hospital course complicated by PE, started on full dose lovenox. Stepped down to RMF for further management.     Hospital course (by problem):   Problem/Plan - 1:  ·  Problem: Pulmonary embolism.   ·  Plan: 3/15 CT chest showing RLL segmental PE w/o evidence of RV dysfunction. B/l lower extremity duplex studies showing no evidence of DVTs bilaterally. Pt started on Lovenox 70mg BID on 3/15.   Plan:   - Switching pt from Lovenox to Eliquis 3/17 (covered by pt's insurance) for treatment of PE  - Continue on Eliquis 5mg BID for 6 months upon discharge      Problem/Plan - 2:  ·  Problem: Leukopenia.   ·  Plan: P/w WBC 2.19 w/o shift. Unknown etiology. Non-toxic appearing, hypothermia, no clear evidence of infection. Urine leg neg, RVP neg. S/p 1 dose of Vanco 1g and Zosyn 3.375 in the ED, not continued. Urine Strep, legionella neg. MRSA negative. UA negative. Afebrile, VSS. Pt improving after > 48hr off abx. Leukopenia closed on 3/17. Not likely to be d/2 infectious etiology.   Plan:   - Follow up with your primary care physician in 1 month upon discharge      Problem/Plan - 3:  ·  Problem: KENYATTA (acute kidney injury).   ·  Plan: Pt with BUN/Cr of 10/0.80 on 3/15 in afternoon with subsequent rise to 14/1.15 on 3/16. Pt was previously in distributive shock, was stabilized with Levophed ggt @ 0.05mcg/kg/min run and d/c @ 1500 on 3/15/23. Suspecting abrupt rise secondary to hypovolemia/hypoperfusion. Urine studies obtained 3/16 showing Urine sodium of 63 and UrCrea of 36. FeNa calculated to 1.4% suggestive of intrinsic etiology. Suspecting source to be from frequent episodes of hypoperfusion causing ischemic ATN. Stable inpatient.   Plan:   - Follow up with your primary care physician in 1 month upon discharge      Problem/Plan - 4:  ·  Problem: Anemia.   ·  Plan: Hgb 10.9 on admission , MCV 68.9. Some schistocytes present. Currently no signs of active bleeding (no hematochezia, melena, hemoptysis, hematuria). Iron studies showing Iron of 56, TIBC of 267, Ferritin of 33, and Iron % Saturation of 21. 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   Plan:   - Follow up with your primary care physician in 1 month upon discharge      Problem/Plan - 5:  ·  Problem: Hypertension.   ·  Plan: Reports hx of HTN, SBP 160s on arrival. Reports being Rx'd anti-HTN meds in the past but does not known which and hasn't been taking. Pt is currently normotensive. On 3/16 on RMF, BP systolic of 139/62 with HR of 64.   Plan:   - Continue to monitor      Problem/Plan - 6:  ·  Problem: Other encephalopathy.   ·  Plan: Initially minimally interactive only nodding head, now returned to baselined MS (A+Ox3) and does remember events leading up to 911 being called. CT head neg for trauma/acute changes. Likely etiology hypothermia. Currently mentating well, AOx3. Some degree/element of confusion assessed on physical examination 3/17 (see Hx of GI bleed). 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 Pt's previous nursing home contacted by  who mentioned Mr. Mathur went out for an appointment on 2/16 and "ran away". He was previously a resident there for 2 years.   Plan:   - Utox neg  - Follow up with your primary care physician in 1 month upon discharge      Problem/Plan - 7:  ·  Problem: History of GI bleed.   ·  Plan: Pt reports hx of "gastric surgery for GI bleed" in 2017 which he believes was @[facility] but no documentation available. Reports previously taking Omeprazole but has not taken any Rx's in some time. Hb 10.9 on admission.  No evidence of GIB at this time, pt denies recent melena/hematochezia/hematemesis. Per note at [facility] in 2016, pt seen by Dr Robin Bland however no documentation found. On AM of 3/17, pt saying his GI surgery was at [facility] 2 years ago -- again no documentation found on [facility]. No objective findings of complications from GI surgery.   Plan:   - c/w Pantoprazole 40mg PO QD.     Problem/Plan - 8:  ·  Problem: Bilateral lower extremity edema.   ·  Plan: No evidence of DVTs on venous duplex studies performed inpatient. Pt noted to be hypoalbuminemic on admission.  Pending TTE. Suspecting low oncotic pressure as source of lower extremity edema, which has improved on examination 3/17  Plan:   - HepC negative.     Problem/Plan - 9:  ·  Problem: Prediabetes.     Patient was discharged to: (home/[facility]/acute rehab/hospice, etc, and with what services – home health PT/RN? Home O2?)    New medications: Eliquis 5mg BID for 6 months   Changes to old medications:  Medications that were stopped:    Items to follow up as outpatient: Follow up with your primary care physician in 1 month upon discharge     Physical exam at the time of discharge:  General: Alert and oriented x 3. No acute distress.  Eyes: EOMI. Anicteric. + copious clear discharge in lower eyelid   HEENT: Moist mucous membranes. No scleral icterus. No cervical lymphadenopathy. Missing teeth on upper and lower mandible   Lungs: No accessory muscle use. Some fine crackles @ L upper apex, but otherwise CTAB   Cardiovascular: Regular rate and rhythm. No murmur. No JVD.  Abdomen: Soft, non-tender and non-distended. No palpable masses. + Ventral abdominal scar noted.   Extremities: Non-tender. + Edematous, non pitting b/l lower extremities with Onychomycotic nails.   Skin: No rashes or lesions. Warm.  Neurologic: No focal neurological deficits. CN II-XII grossly intact, but not individually tested.  Psychiatric: Cooperative. Appropriate mood and affect.     #Discharge: do not delete    73 y/o undomiciled Male current smoker (1ppd x50+yrs), w/ PSHx of unspecified GI surgery reportedly for GIB, and PMHx of HTN who was initially BIBEMS for dizziness, inability to walk and AMS, was found to be profoundly hypothermic, admitted to MICU for active rewarming protocol and levophed gtt for vasodilation while rewarming, now normothermic, AAOx3, off levophed gtt. Hospital course complicated by PE, started on full dose lovenox. Stepped down to RMF for further management.     Hospital course (by problem):   Problem/Plan - 1:  ·  Problem: Pulmonary embolism.   ·  Plan: 3/15 CT chest showing RLL segmental PE w/o evidence of RV dysfunction. B/l lower extremity duplex studies showing no evidence of DVTs bilaterally. Pt started on Lovenox 70mg BID on 3/15.   Plan:   - Switching pt from Lovenox to Eliquis 3/17 (covered by pt's insurance) for treatment of PE  - Continue on Eliquis 5mg BID for 90 days upon discharge starting 3/23/2023     Problem/Plan - 2:  ·  Problem: Leukopenia.   ·  Plan: P/w WBC 2.19 w/o shift. Unknown etiology. Non-toxic appearing, hypothermia, no clear evidence of infection. Urine leg neg, RVP neg. S/p 1 dose of Vanco 1g and Zosyn 3.375 in the ED, not continued. Urine Strep, legionella neg. MRSA negative. UA negative. Afebrile, VSS. Pt improving after > 48hr off abx. Leukopenia closed on 3/17. Not likely to be d/2 infectious etiology.   Plan:   - Follow up with your primary care physician in 1 month upon discharge      Problem/Plan - 3:  ·  Problem: KENYATTA (acute kidney injury).   ·  Plan: Pt with BUN/Cr of 10/0.80 on 3/15 in afternoon with subsequent rise to 14/1.15 on 3/16. Pt was previously in distributive shock, was stabilized with Levophed ggt @ 0.05mcg/kg/min run and d/c @ 1500 on 3/15/23. Suspecting abrupt rise secondary to hypovolemia/hypoperfusion. Urine studies obtained 3/16 showing Urine sodium of 63 and UrCrea of 36. FeNa calculated to 1.4% suggestive of intrinsic etiology. Suspecting source to be from frequent episodes of hypoperfusion causing ischemic ATN. Stable inpatient. BUN/Cr improved on 3/20/23.   Plan:   - Follow up with your primary care physician in 1 month upon discharge      Problem/Plan - 4:  ·  Problem: Anemia.   ·  Plan: Hgb 10.9 on admission , MCV 68.9. Some schistocytes present. Currently no signs of active bleeding (no hematochezia, melena, hemoptysis, hematuria). Iron studies showing Iron of 56, TIBC of 267, Ferritin of 33, and Iron % Saturation of 21. 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   Plan:   - Follow up with your primary care physician in 1 month upon discharge      Problem/Plan - 5:  ·  Problem: Hypertension.   ·  Plan: Reports hx of HTN, SBP 160s on arrival. Reports being Rx'd anti-HTN meds in the past but does not known which and hasn't been taking. Pt is currently normotensive. On 3/16 on RMF, BP systolic of 139/62 with HR of 64.   Plan:   - Continue to monitor      Problem/Plan - 6:  ·  Problem: Other encephalopathy.   ·  Plan: Initially minimally interactive only nodding head, now returned to baselined MS (A+Ox3) and does remember events leading up to 911 being called. CT head neg for trauma/acute changes. Likely etiology hypothermia. Currently mentating well, AOx3. Some degree/element of confusion assessed on physical examination 3/17 (see Hx of GI bleed). 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 Pt's previous nursing home contacted by SW who mentioned Mr. Mathur went out for an appointment on 2/16 and "ran away". He was previously a resident there for 2 years. Behavioral health consulted, recommending Abilify 10mg daily. UTox negative.   Plan:   - C/w Abilify 10mg daily   - Follow up with your primary care physician in 1 month upon discharge      Problem/Plan - 7:  ·  Problem: History of GI bleed.   ·  Plan: Pt reports hx of "gastric surgery for GI bleed" in 2017 which he believes was @[facility] but no documentation available. Reports previously taking Omeprazole but has not taken any Rx's in some time. Hb 10.9 on admission.  No evidence of GIB at this time, pt denies recent melena/hematochezia/hematemesis. Per note at [facility] in 2016, pt seen by Dr Robin Bland however no documentation found. On AM of 3/17, pt saying his GI surgery was at [facility] 2 years ago -- [facility] No objective findings of complications from GI surgery.   Plan:   - c/w Pantoprazole 40mg PO QD.     Problem/Plan - 8:  ·  Problem: Bilateral lower extremity edema.   ·  Plan: No evidence of DVTs on venous duplex studies performed inpatient. Pt noted to be hypoalbuminemic on admission.  Pending TTE. Suspecting low oncotic pressure as source of lower extremity edema, which has improved on examination 3/17  Plan:   - HepC negative.     Problem/Plan - 9:  ·  Problem: Prediabetes.     Patient was discharged to: (home/[facility]/acute rehab/hospice, etc, and with what services – home health PT/RN? Home O2?)    New medications: Eliquis 5mg BID for 90 days.    Changes to old medications:  Medications that were stopped:    Items to follow up as outpatient: Follow up with your primary care physician in 1 month upon discharge     Physical exam at the time of discharge:  General: Alert and oriented x 3. No acute distress.  Eyes: EOMI. Anicteric. + copious clear discharge in lower eyelid   HEENT: Moist mucous membranes. No scleral icterus. No cervical lymphadenopathy. Missing teeth on upper and lower mandible   Lungs: No accessory muscle use. Some fine crackles @ L upper apex, but otherwise CTAB   Cardiovascular: Regular rate and rhythm. No murmur. No JVD.  Abdomen: Soft, non-tender and non-distended. No palpable masses. + Ventral abdominal scar noted.   Extremities: Non-tender. + Edematous, non pitting b/l lower extremities with Onychomycotic nails.   Skin: No rashes or lesions. Warm.  Neurologic: No focal neurological deficits. CN II-XII grossly intact, but not individually tested.  Psychiatric: Cooperative. Appropriate mood and affect.     #Discharge: do not delete    73 y/o undomiciled Male current smoker (1ppd x50+yrs), w/ PSHx of unspecified GI surgery reportedly for GIB, and PMHx of HTN who was initially BIBEMS for dizziness, inability to walk and AMS, was found to be profoundly hypothermic, admitted to MICU for active rewarming protocol and levophed gtt for vasodilation while rewarming, now normothermic, AAOx3, off levophed gtt. Hospital course complicated by PE, started on full dose lovenox. Stepped down to RMF for further management.     Hospital course (by problem):   Problem/Plan - 1:  ·  Problem: Unprovoked pulmonary embolism.   ·  Plan: 3/15 CT chest showing RLL segmental PE w/o evidence of RV dysfunction. B/l lower extremity duplex studies showing no evidence of DVTs bilaterally. Pt started on Lovenox 70mg BID on 3/15.   Plan:   - Switching pt from Lovenox to Eliquis 3/17 (covered by pt's insurance) for treatment of PE  - Continue on Eliquis 5mg BID for 90 days upon discharge starting 3/23/2023     Problem/Plan - 2:  ·  Problem: Leukopenia.   ·  Plan: P/w WBC 2.19 w/o shift. Unknown etiology. Non-toxic appearing, hypothermia, no clear evidence of infection. Urine leg neg, RVP neg. S/p 1 dose of Vanco 1g and Zosyn 3.375 in the ED, not continued. Urine Strep, legionella neg. MRSA negative. UA negative. Afebrile, VSS. Pt improving after > 48hr off abx. Leukopenia closed on 3/17. Not likely to be d/2 infectious etiology.   Plan:   - Follow up with your primary care physician in 1 month upon discharge      Problem/Plan - 3:  ·  Problem: KENYATTA (acute kidney injury).   ·  Plan: Pt with BUN/Cr of 10/0.80 on 3/15 in afternoon with subsequent rise to 14/1.15 on 3/16. Pt was previously in distributive shock, was stabilized with Levophed ggt @ 0.05mcg/kg/min run and d/c @ 1500 on 3/15/23. Suspecting abrupt rise secondary to hypovolemia/hypoperfusion. Urine studies obtained 3/16 showing Urine sodium of 63 and UrCrea of 36. FeNa calculated to 1.4% suggestive of intrinsic etiology. Suspecting source to be from frequent episodes of hypoperfusion causing ischemic ATN. Stable inpatient. BUN/Cr improved on 3/20/23.   Plan:   - Follow up with your primary care physician in 1 month upon discharge      Problem/Plan - 4:  ·  Problem: Anemia.   ·  Plan: Hgb 10.9 on admission , MCV 68.9. Some schistocytes present. Currently no signs of active bleeding (no hematochezia, melena, hemoptysis, hematuria). Iron studies showing Iron of 56, TIBC of 267, Ferritin of 33, and Iron % Saturation of 21. 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   Plan:   - Follow up with your primary care physician in 1 month upon discharge      Problem/Plan - 5:  ·  Problem: Hypertension.   ·  Plan: Reports hx of HTN, SBP 160s on arrival. Reports being Rx'd anti-HTN meds in the past but does not known which and hasn't been taking. Pt is currently normotensive. On 3/16 on RMF, BP systolic of 139/62 with HR of 64.   Plan:   - Continue to monitor      Problem/Plan - 6:  ·  Problem: Other encephalopathy.   ·  Plan: Initially minimally interactive only nodding head, now returned to baselined MS (A+Ox3) and does remember events leading up to 911 being called. CT head neg for trauma/acute changes. Likely etiology hypothermia. Currently mentating well, AOx3. Some degree/element of confusion assessed on physical examination 3/17 (see Hx of GI bleed). 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 Pt's previous nursing home contacted by SW who mentioned Mr. Mathur went out for an appointment on 2/16 and "ran away". He was previously a resident there for 2 years. Behavioral health consulted, recommending Abilify 10mg daily. UTox negative.   Plan:   - C/w Abilify 10mg daily   - Follow up with your primary care physician in 1 month upon discharge      Problem/Plan - 7:  ·  Problem: History of GI bleed.   ·  Plan: Pt reports hx of "gastric surgery for GI bleed" in 2017 which he believes was @[facility] but no documentation available. Reports previously taking Omeprazole but has not taken any Rx's in some time. Hb 10.9 on admission.  No evidence of GIB at this time, pt denies recent melena/hematochezia/hematemesis. Per note at [facility] in 2016, pt seen by Dr Robin Bland however no documentation found. On AM of 3/17, pt saying his GI surgery was at [facility] years [facility] No objective findings of complications from GI surgery.   Plan:   - c/w Pantoprazole 40mg PO QD.     Problem/Plan - 8:  ·  Problem: Bilateral lower extremity edema.   ·  Plan: No evidence of DVTs on venous duplex studies performed inpatient. Pt noted to be hypoalbuminemic on admission.  Pending TTE. Suspecting low oncotic pressure as source of lower extremity edema, which has improved on examination 3/17  Plan:   - HepC negative.     Problem/Plan - 9:  ·  Problem: Prediabetes.   Patient with A1C of 6  - Follow up with your primary care physician in 1 month upon discharge     Patient was discharged to: Undomiciled no needs     New medications: Eliquis 5mg BID for 90 days, Abilify 10 mg daily  Changes to old medications: None  Medications that were stopped: None     Items to follow up as outpatient: Follow up with your primary care physician in 1 month upon discharge     Physical exam at the time of discharge:  General: Alert and oriented x 3. No acute distress.  Eyes: EOMI. Anicteric. + copious clear discharge in lower eyelid   HEENT: Moist mucous membranes. No scleral icterus. No cervical lymphadenopathy. Missing teeth on upper and lower mandible   Lungs: No accessory muscle use. Some fine crackles @ L upper apex, but otherwise CTAB   Cardiovascular: Regular rate and rhythm. No murmur. No JVD.  Abdomen: Soft, non-tender and non-distended. No palpable masses. + Ventral abdominal scar noted.   Extremities: Non-tender. + Edematous, non pitting b/l lower extremities with Onychomycotic nails.   Skin: No rashes or lesions. Warm.  Neurologic: No focal neurological deficits. CN II-XII grossly intact, but not individually tested.  Psychiatric: Cooperative. Appropriate mood and affect.

## 2023-03-17 NOTE — PROGRESS NOTE ADULT - PROBLEM SELECTOR PLAN 3
Pt with BUN/Cr of 10/0.80 on 3/15 in afternoon with subsequent rise to 14/1.15 on 3/16. Pt was previously in distributive shock, was stabilized with Levophed ggt @ 0.05mcg/kg/min run and d/c @ 1500 on 3/15/23. Suspecting abrupt rise secondary to hypovolemia/hypoperfusion. Urine studies obtained 3/16 showing Urine sodium of 63 and UrCrea of 36. FeNa calculated to 1.4% suggestive of intrinsic etiology. Suspecting source to be from frequent episodes of hypoperfusion causing ischemic ATN   Plan:   - Continue to trend CMPs daily   - Avoid nephrotoxic agents Pt with BUN/Cr of 10/0.80 on 3/15 in afternoon with subsequent rise to 14/1.15 on 3/16. Pt was previously in distributive shock, was stabilized with Levophed ggt @ 0.05mcg/kg/min run and d/c @ 1500 on 3/15/23. Suspecting abrupt rise secondary to hypovolemia/hypoperfusion. Urine studies obtained 3/16 showing Urine sodium of 63 and UrCrea of 36. FeNa calculated to 1.4% suggestive of intrinsic etiology. Suspecting source to be from frequent episodes of hypoperfusion causing ischemic ATN.  Plan:   - Suspecting plateau of Cr followed by polyuria in ATN.   - Continue to trend CMPs daily   - Avoid nephrotoxic agents

## 2023-03-17 NOTE — PROGRESS NOTE ADULT - SUBJECTIVE AND OBJECTIVE BOX
CC: Patient is a 72y old  Male who presents with a chief complaint of Hypothermia, AMS     INTERVAL EVENTS: LIZA  SUBJECTIVE / INTERVAL HPI: Patient seen and examined at bedside.   ROS: negative unless otherwise stated above.    VITAL SIGNS:  Vital Signs Last 24 Hrs  T(C): 37.4 (17 Mar 2023 05:21), Max: 37.4 (17 Mar 2023 05:21)  T(F): 99.3 (17 Mar 2023 05:21), Max: 99.3 (17 Mar 2023 05:21)  HR: 61 (17 Mar 2023 05:21) (61 - 86)  BP: 128/61 (17 Mar 2023 05:21) (126/65 - 139/62)  BP(mean): --  RR: 17 (17 Mar 2023 05:21) (17 - 18)  SpO2: 97% (17 Mar 2023 05:21) (97% - 100%)    Parameters below as of 17 Mar 2023 05:21  Patient On (Oxygen Delivery Method): room air          03-15-23 @ 07:01  -  23 @ 07:00  --------------------------------------------------------  IN: 0 mL / OUT: 975 mL / NET: -975 mL        PHYSICAL EXAM:    General: NAD  HEENT: MMM  Neck: supple  Cardiovascular: +S1/S2; RRR  Respiratory: CTA B/L; no W/R/R  Gastrointestinal: soft, NT/ND  Extremities: WWP; no edema, clubbing or cyanosis  Vascular: 2+ radial, DP/PT pulses B/L  Neurological: AAOx3; no focal deficits    MEDICATIONS:  MEDICATIONS  (STANDING):  enoxaparin Injectable 70 milliGRAM(s) SubCutaneous every 12 hours  influenza  Vaccine (HIGH DOSE) 0.7 milliLiter(s) IntraMuscular once  nicotine - 21 mG/24Hr(s) Patch 1 Patch Transdermal daily  pantoprazole    Tablet 40 milliGRAM(s) Oral before breakfast    MEDICATIONS  (PRN):      ALLERGIES:  Allergies    No Known Allergies    Intolerances        LABS:                        9.1    3.09  )-----------( 252      ( 16 Mar 2023 05:30 )             29.6     03-16    145  |  110<H>  |  14  ----------------------------<  64<L>  4.0   |  28  |  1.15    Ca    8.8      16 Mar 2023 05:30  Phos  3.8     03-16  Mg     2.0     03-16    TPro  6.1  /  Alb  2.9<L>  /  TBili  <0.2  /  DBili  x   /  AST  53<H>  /  ALT  47<H>  /  AlkPhos  156<H>  03-16    PT/INR - ( 16 Mar 2023 05:30 )   PT: 12.0 sec;   INR: 1.01          PTT - ( 15 Mar 2023 09:11 )  PTT:45.7 sec  Urinalysis Basic - ( 16 Mar 2023 14:46 )    Color: Yellow / Appearance: Clear / S.015 / pH: x  Gluc: x / Ketone: NEGATIVE  / Bili: Negative / Urobili: 0.2 E.U./dL   Blood: x / Protein: NEGATIVE mg/dL / Nitrite: NEGATIVE   Leuk Esterase: NEGATIVE / RBC: x / WBC x   Sq Epi: x / Non Sq Epi: x / Bacteria: x      CAPILLARY BLOOD GLUCOSE      POCT Blood Glucose.: 95 mg/dL (17 Mar 2023 01:32)      RADIOLOGY & ADDITIONAL TESTS: Reviewed. CC: Patient is a 72y old  Male who presents with a chief complaint of Hypothermia, AMS     INTERVAL EVENTS: LIZA  SUBJECTIVE / INTERVAL HPI: Patient seen and examined at bedside. Pt requesting followup with surgeon regarding his abdominal surgery pt is convinced occurred at [facility] 2 years ago. Sravanthi saunders, pt has record of "Gastric surgery several years ago" in 2016. Gastric CT was obtained in 2016 with Hunter Maynard. ED record in 2016 reporting pt followed up with Dr Porras, was there on 2016? However, no records of gastric surgery occurring at [facility] within the past 2 years. Otherwise, pt says he is feeling better, denies any headaches, dizziness, CP, SOB, abdominal pain, constipation, dysuria.   ROS: negative unless otherwise stated above.    VITAL SIGNS:  Vital Signs Last 24 Hrs  T(C): 37.4 (17 Mar 2023 05:21), Max: 37.4 (17 Mar 2023 05:21)  T(F): 99.3 (17 Mar 2023 05:21), Max: 99.3 (17 Mar 2023 05:21)  HR: 61 (17 Mar 2023 05:21) (61 - 86)  BP: 128/61 (17 Mar 2023 05:21) (126/65 - 139/62)  BP(mean): --  RR: 17 (17 Mar 2023 05:21) (17 - 18)  SpO2: 97% (17 Mar 2023 05:21) (97% - 100%)    Parameters below as of 17 Mar 2023 05:21  Patient On (Oxygen Delivery Method): room air          03-15-23 @ 07:01  -  23 @ 07:00  --------------------------------------------------------  IN: 0 mL / OUT: 975 mL / NET: -975 mL        PHYSICAL EXAM:    General: NAD  HEENT: MMM  Neck: supple  Cardiovascular: +S1/S2; RRR  Respiratory: CTA B/L; no W/R/R  Gastrointestinal: soft, NT/ND  Extremities: WWP; no edema, clubbing or cyanosis  Vascular: 2+ radial, DP/PT pulses B/L  Neurological: AAOx3; no focal deficits    MEDICATIONS:  MEDICATIONS  (STANDING):  enoxaparin Injectable 70 milliGRAM(s) SubCutaneous every 12 hours  influenza  Vaccine (HIGH DOSE) 0.7 milliLiter(s) IntraMuscular once  nicotine - 21 mG/24Hr(s) Patch 1 Patch Transdermal daily  pantoprazole    Tablet 40 milliGRAM(s) Oral before breakfast    MEDICATIONS  (PRN):      ALLERGIES:  Allergies    No Known Allergies    Intolerances        LABS:                        9.1    3.09  )-----------( 252      ( 16 Mar 2023 05:30 )             29.6     03-16    145  |  110<H>  |  14  ----------------------------<  64<L>  4.0   |  28  |  1.15    Ca    8.8      16 Mar 2023 05:30  Phos  3.8     03-16  Mg     2.0     03-16    TPro  6.1  /  Alb  2.9<L>  /  TBili  <0.2  /  DBili  x   /  AST  53<H>  /  ALT  47<H>  /  AlkPhos  156<H>  03-16    PT/INR - ( 16 Mar 2023 05:30 )   PT: 12.0 sec;   INR: 1.01          PTT - ( 15 Mar 2023 09:11 )  PTT:45.7 sec  Urinalysis Basic - ( 16 Mar 2023 14:46 )    Color: Yellow / Appearance: Clear / S.015 / pH: x  Gluc: x / Ketone: NEGATIVE  / Bili: Negative / Urobili: 0.2 E.U./dL   Blood: x / Protein: NEGATIVE mg/dL / Nitrite: NEGATIVE   Leuk Esterase: NEGATIVE / RBC: x / WBC x   Sq Epi: x / Non Sq Epi: x / Bacteria: x      CAPILLARY BLOOD GLUCOSE      POCT Blood Glucose.: 95 mg/dL (17 Mar 2023 01:32)      RADIOLOGY & ADDITIONAL TESTS: Reviewed. CC: Patient is a 72y old  Male who presents with a chief complaint of Hypothermia, AMS     INTERVAL EVENTS: LIZA  SUBJECTIVE / INTERVAL HPI: Patient seen and examined at bedside. Pt requesting followup with surgeon regarding his abdominal surgery pt is convinced occurred at [facility] 2 years ago. Sravanthi saunders, pt has record of "Gastric surgery several years ago" in 2016. Gastric CT was obtained in 2016 with Hunter Maynard. ED record in 2016 reporting pt followed up with Dr Porras, was there on 2016? However, no records of gastric surgery occurring at [facility] within the past 2 years. Otherwise, pt says he is feeling better, denies any headaches, dizziness, CP, SOB, abdominal pain, constipation, dysuria.   ROS: negative unless otherwise stated above.    VITAL SIGNS:  Vital Signs Last 24 Hrs  T(C): 37.4 (17 Mar 2023 05:21), Max: 37.4 (17 Mar 2023 05:21)  T(F): 99.3 (17 Mar 2023 05:21), Max: 99.3 (17 Mar 2023 05:21)  HR: 61 (17 Mar 2023 05:21) (61 - 86)  BP: 128/61 (17 Mar 2023 05:21) (126/65 - 139/62)  BP(mean): --  RR: 17 (17 Mar 2023 05:21) (17 - 18)  SpO2: 97% (17 Mar 2023 05:21) (97% - 100%)    Parameters below as of 17 Mar 2023 05:21  Patient On (Oxygen Delivery Method): room air          03-15-23 @ 07:01  -  23 @ 07:00  --------------------------------------------------------  IN: 0 mL / OUT: 975 mL / NET: -975 mL        PHYSICAL EXAM:  General: Alert and oriented x 3. No acute distress.  Eyes: EOMI. Anicteric. + copious clear discharge in lower eyelid   HEENT: Moist mucous membranes. No scleral icterus. No cervical lymphadenopathy. Missing teeth on upper and lower mandible   Lungs: No accessory muscle use. Some fine crackles @ L upper apex, but otherwise CTAB   Cardiovascular: Regular rate and rhythm. No murmur. No JVD.  Abdomen: Soft, non-tender and non-distended. No palpable masses. + Ventral abdominal scar noted.   Extremities: Non-tender. + Edematous, non pitting b/l lower extremities with Onychomycotic nails.   Skin: No rashes or lesions. Warm.  Neurologic: No focal neurological deficits. CN II-XII grossly intact, but not individually tested.  Psychiatric: Cooperative. Appropriate mood and affect.    MEDICATIONS:  MEDICATIONS  (STANDING):  enoxaparin Injectable 70 milliGRAM(s) SubCutaneous every 12 hours  influenza  Vaccine (HIGH DOSE) 0.7 milliLiter(s) IntraMuscular once  nicotine - 21 mG/24Hr(s) Patch 1 Patch Transdermal daily  pantoprazole    Tablet 40 milliGRAM(s) Oral before breakfast    MEDICATIONS  (PRN):      ALLERGIES:  Allergies    No Known Allergies    Intolerances        LABS:                        9.1    3.09  )-----------( 252      ( 16 Mar 2023 05:30 )             29.6     03-16    145  |  110<H>  |  14  ----------------------------<  64<L>  4.0   |  28  |  1.15    Ca    8.8      16 Mar 2023 05:30  Phos  3.8     03-16  Mg     2.0     03-16    TPro  6.1  /  Alb  2.9<L>  /  TBili  <0.2  /  DBili  x   /  AST  53<H>  /  ALT  47<H>  /  AlkPhos  156<H>  03-16    PT/INR - ( 16 Mar 2023 05:30 )   PT: 12.0 sec;   INR: 1.01          PTT - ( 15 Mar 2023 09:11 )  PTT:45.7 sec  Urinalysis Basic - ( 16 Mar 2023 14:46 )    Color: Yellow / Appearance: Clear / S.015 / pH: x  Gluc: x / Ketone: NEGATIVE  / Bili: Negative / Urobili: 0.2 E.U./dL   Blood: x / Protein: NEGATIVE mg/dL / Nitrite: NEGATIVE   Leuk Esterase: NEGATIVE / RBC: x / WBC x   Sq Epi: x / Non Sq Epi: x / Bacteria: x      CAPILLARY BLOOD GLUCOSE      POCT Blood Glucose.: 95 mg/dL (17 Mar 2023 01:32)      RADIOLOGY & ADDITIONAL TESTS: Reviewed.

## 2023-03-17 NOTE — DISCHARGE NOTE PROVIDER - NSDCMRMEDTOKEN_GEN_ALL_CORE_FT
Eliquis 5 mg oral tablet: 2 tab(s) orally 2 times a day for 7 days, then 1 tab 2 times a day.   omeprazole 20 mg oral delayed release capsule: 1 cap(s) orally once a day    omeprazole 20 mg oral delayed release capsule: 1 cap(s) orally once a day    ARIPiprazole 10 mg oral tablet: 1 tab(s) orally once a day  Eliquis 5 mg oral tablet: 1 tab(s) orally 2 times a day   Multiple Vitamins oral tablet: 1 tab(s) orally once a day  nicotine 21 mg/24 hr transdermal film, extended release: 21 mg/24h transdermal once a day   omeprazole 20 mg oral delayed release capsule: 1 cap(s) orally once a day

## 2023-03-17 NOTE — PROGRESS NOTE ADULT - PROBLEM SELECTOR PLAN 9
RESOLVED    Initially minimally interactive only nodding head, now returned to baselined MS (A+Ox3) and does remember events leading up to 911 being called. CT head neg for trauma/acute changes. Likely etiology hypothermia. Currently mentating well, AOx3.   Plan:   - Utox neg A1c 6 during this admission  Plan:   - f/u outpt

## 2023-03-17 NOTE — BH CONSULTATION LIAISON ASSESSMENT NOTE - NSBHATTESTCOMMENTATTENDFT_PSY_A_CORE
71yo M, hx psychosis, HTN, smoking, hx GI bleed, not in medical care, homeless sleeping on steps of a [employer], BIB EMS for AMS, hypothermia, dizziness.  Pt admitted to MICU, s/p rewarming protocol, cognitive status improved, w/u revealed PE.  QTc 524 on 3/15- improved to 481 on 3/16.  Psychiatry consulted for pt making illogical statements.  No capacity question.  Chart reviewed, pt with hx psychosis in 2013 legacy notes, had been given Haldol.  Seizure history also was mentioned.  Pt assessed at bedside.  Pt alert, attentive, calm and cooperative.  He was notably disorganized and derailing, with NINO.  He denies AVH and not internally preoccupied, denies SI (ever) or HI, no hx psych admissions but had seen a psychiatrist in the past.  Pt unable to provide details.  He could not explain why he left his residence or where he had been staying, could not describe more than some cursory details re: events PTA or where he will live following discharge.  Pt with intact orientation and cognition.  TC benign and future oriented with no themes indicative of high suicide risk at this time.  Denies substance besides tobacco. Delirium possible, history may make chronic psychotic disorder likely.  Treating psychotic process will hopefully help pt with functioning once discharged.  Per QT prolongation on 3/15 and 3/16, though recently on 3/16 improved to 481, would favor abilify as antipsychotic.  -no indication for 1:1  -begin abilify 5mg PO qday tomorrow AM  -please repeat EKG, hold abilify if QTc >500  -please call if questions, we will follow with you  -SW consult [facility] dispo planning

## 2023-03-17 NOTE — PROGRESS NOTE ADULT - PROBLEM SELECTOR PLAN 7
Pt reports hx of "gastric surgery for GI bleed" in 2017 which he believes was @[facility] but no documentation available. Reports previously taking Omeprazole but has not taken any Rx's in some time. Hb 10.9 on admission.  No evidence of GIB at this time, pt denies recent melena/hematochezia/hematemesis. Per note at [facility] in 2016, pt perhaps saw surgeon Robin Bland however no documentation found.   Plan:   - c/w Pantoprazole 40mg PO QD Pt reports hx of "gastric surgery for GI bleed" in 2017 which he believes was @[facility] but no documentation available. Reports previously taking Omeprazole but has not taken any Rx's in some time. Hb 10.9 on admission.  No evidence of GIB at this time, pt denies recent melena/hematochezia/hematemesis. Per note at [facility] in 2016, pt seen by Dr Robin Bland however no documentation found. On AM of 3/17, pt saying his GI surgery was at [facility] 2 years ago -- again no documentation found on [facility]. No objective findings of complications from GI surgery.   Plan:   - c/w Pantoprazole 40mg PO QD

## 2023-03-17 NOTE — DISCHARGE NOTE PROVIDER - NSDCFUADDAPPT_GEN_ALL_CORE_FT
Please bring your Insurance card, Photo ID and Discharge paperwork to the following appointment:    (1) Please follow up with [facility] with Dr. Lavonne Champion on behalf of Dr. Zahraa Ritetr at [address] [place] [address], [place] [address] on 03/30/2023 at 12:00pm.    Appointment was scheduled by Ms. DOM Chavez, Referral Coordinator.

## 2023-03-17 NOTE — DISCHARGE NOTE PROVIDER - NSDCCPCAREPLAN_GEN_ALL_CORE_FT
PRINCIPAL DISCHARGE DIAGNOSIS  Diagnosis: Pulmonary embolism  Assessment and Plan of Treatment: A pulmonary embolism (PE) is a sudden blockage or decrease of blood flow in one or both lungs that happens when a clot travels into the arteries of the lung (pulmonary arteries). Most blockages come from a blood clot that forms in the vein of a leg or arm (deep vein thrombosis, DVT) and travels to the lungs. A clot is blood that has thickened into a gel or solid. PE is a dangerous and life-threatening condition that needs to be treated right away. This condition is usually caused by a blood clot that forms in a vein and moves to the lungs. In rare cases, it may be caused by air, fat, part of a tumor, or other tissue that moves through the veins and into the lungs. You will require medications upon discharge for treatment of your PE. Please continue to take Eliquis 5mg twice a day for 90 days starting on 3/23/2023. Additionally, we strongly recommend you follow up with your primary care physician in 1 month after discharge.

## 2023-03-17 NOTE — BH CONSULTATION LIAISON ASSESSMENT NOTE - SUMMARY
73 y/o undomiciled Male, poor historian,  w/ PMHx of HTN, unspecified psychosis 2013, PSHx of unspecified GI surgery reportedly for GI-bleed, and eye surgery (?), brought by EMS for dizziness, inability to walk and AMS, found to be profoundly hypothermic, admitted to MICU, downgraded to medicine floor. Pt exhibiting intact cognitive function, AAOx4,demostrating disorganized and tangential thought, often derailing when answering questions with possible delusional thinking. Pt denies alcohol and substance use other than cigarettes. No SI, HI, A/VH. Most recent QTc of 481    Plan:   - start antipsychotic, Abilify 5mg every morning  - pt can refuse antipsychotic but begin offering tomorrow  - continue to monitor EKG, most recent QTc 481

## 2023-03-17 NOTE — BH CONSULTATION LIAISON ASSESSMENT NOTE - NSBHCHARTREVIEWVS_PSY_A_CORE FT
Vital Signs Last 24 Hrs  T(C): 37 (17 Mar 2023 11:20), Max: 37.4 (17 Mar 2023 05:21)  T(F): 98.6 (17 Mar 2023 11:20), Max: 99.3 (17 Mar 2023 05:21)  HR: 57 (17 Mar 2023 11:20) (57 - 74)  BP: 119/61 (17 Mar 2023 11:20) (119/61 - 128/61)  BP(mean): --  RR: 18 (17 Mar 2023 11:20) (17 - 18)  SpO2: 97% (17 Mar 2023 11:20) (97% - 100%)    Parameters below as of 17 Mar 2023 11:20  Patient On (Oxygen Delivery Method): room air

## 2023-03-17 NOTE — PROGRESS NOTE ADULT - PROBLEM SELECTOR PLAN 6
A1c 6 during this admission  Plan:   - f/u outpt Initially minimally interactive only nodding head, now returned to baselined MS (A+Ox3) and does remember events leading up to 911 being called. CT head neg for trauma/acute changes. Likely etiology hypothermia. Currently mentating well, AOx3. Some degree/element of confusion assessed on physical examination 3/17 (see Hx of GI bleed). 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 Pt's previous nursing home contacted by SW who mentioned Mr. Mathur went out for an appointment on 2/16 and "ran away". He was previously a resident there for 2 years.   Plan:   - Utox neg  - Pending Psychiatry consultation to aid in assessing capacity and for possible psychosis

## 2023-03-17 NOTE — PROGRESS NOTE ADULT - PROBLEM SELECTOR PLAN 10
FLUIDS: None  ELECTROLYTES: Mg<2, K<4  DIET: DASH  VTE PPX: Lovenox (treatment dose for PE)  CODE: FULL  DISPO: MICU > RMF RESOLVED   p/w severe hypothermia w/ T81.5F rectally. TSH WNL. Most likely etiology environmental exposure  - s/p Active Internal Rewarming Protocol including bairhugger, warmed IVF and bladder irrigation (goal to increase T 2 degrees Celcius/hour until goal T 96.0F) - now normothermic      #Hypotension - RESOLVED   Likely etiology distributive shock 2/2 vasodilation from active rewarming. Lactate WNL.  - s/p active rewarming protocol, (goal temp 96F) and levophed gtt; off levophed gtt since 3/15 3pm     #Bradycardia - RESOLVED   Initially p/w HR 31 w/ Collado waves on EKG. Likely etiology hypothermia  - HR 60s-70s

## 2023-03-17 NOTE — BH CONSULTATION LIAISON ASSESSMENT NOTE - DIFFERENTIAL
-Chronic undiagnosed psychotic disorder, Schizophrenia  - Psychosis NOS  -past CVA  -Metabolic derangement

## 2023-03-17 NOTE — PROGRESS NOTE ADULT - PROBLEM SELECTOR PLAN 1
3/15 CT chest showing RLL segmental PE w/o evidence of RV dysfunction. B/l lower extremity duplex studies showing no evidence of DVTs bilaterally.   Plan:   - c/w Lovenox 70mg BID (3/15- )   - f/u TTE to evaluate for R heart strain   - poor candidate for Coumadin 2/2 hx of medication non-compliance and undomiciled 3/15 CT chest showing RLL segmental PE w/o evidence of RV dysfunction. B/l lower extremity duplex studies showing no evidence of DVTs bilaterally. Pt started on Lovenox 70mg BID on 3/15.   Plan:   - Switching pt from Lovenox to Eliquis 3/17 (covered by pt's insurance) for treatment of PE  - f/u TTE to evaluate for R heart strain   - poor candidate for Coumadin 2/2 hx of medication non-compliance and undomiciled

## 2023-03-17 NOTE — BH CONSULTATION LIAISON ASSESSMENT NOTE - HPI (INCLUDE ILLNESS QUALITY, SEVERITY, DURATION, TIMING, CONTEXT, MODIFYING FACTORS, ASSOCIATED SIGNS AND SYMPTOMS)
Pt is a 71 y/o M undomiciled, poor historian, w/ past medical hx obtained from patient and collateral of HTN, unspecified psychosis 2013, PSHx of unspecified GI surgery reportedly for GIB, and eye surgery at [facility] (?), brought to [facility] after being found outside AMS c/o dizziness and inability to walk. Pt found to be hypothermic to 81.5F, minimally interactive. MICU was consulted by ED, Pt started on acute internal rewarming protocol and started on empiric antibiotics, and was subsequently admitted to the MICU for further management and evaluation. Psychiatry consulted for assessment of confusion and AMS. Pt seen at bedside exhibiting intact cognitive function, oriented to self, location, day, month, year, and situation. 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 Pt denies alcohol and substance use other than cigarettes. No SI, HI, A/VH. Most recent QTc of 481.

## 2023-03-17 NOTE — PROGRESS NOTE ADULT - PROBLEM SELECTOR PLAN 8
RESOLVED   p/w severe hypothermia w/ T81.5F rectally. TSH WNL. Most likely etiology environmental exposure  - s/p Active Internal Rewarming Protocol including bairhugger, warmed IVF and bladder irrigation (goal to increase T 2 degrees Celcius/hour until goal T 96.0F) - now normothermic      #Hypotension - RESOLVED   Likely etiology distributive shock 2/2 vasodilation from active rewarming. Lactate WNL.  - s/p active rewarming protocol, (goal temp 96F) and levophed gtt; off levophed gtt since 3/15 3pm     #Bradycardia - RESOLVED   Initially p/w HR 31 w/ Collado waves on EKG. Likely etiology hypothermia  - HR 60s-70s No evidence of DVTs on venous duplex studies performed inpatient. Pt noted to be hypoalbuminemic on admission.  Pending TTE. Suspecting low oncotic pressure as source of lower extremity edema, which has improved on examination 3/17  Plan:   - F/u TTE   - Consider RUQ US if TTE unremarkable   - HepC negative

## 2023-03-17 NOTE — BH CONSULTATION LIAISON ASSESSMENT NOTE - OTHER
Pt reports being picked up by ambulance but could not provide details about where he was picked up from, events leading up to EMS transport.

## 2023-03-17 NOTE — DISCHARGE NOTE PROVIDER - ATTENDING DISCHARGE PHYSICAL EXAMINATION:
Patient at baseline, BP/HR stable. Refused NH placement, refused Shelter.   General: AOX2-3, NAD, lying in bed, no labored breathing on RA  HEENT: AT/NC, no facial asymmetry  Lungs: poor inspiration, no crackles  Heart: RRR  Abdomen: soft, non-tender  Extremities: warm, no edema,  no calf tenderness, no focal deficit..       Patient at baseline, BP/HR stable. Refused NH placement, refused Shelter.   General: AOX2, NAD, lying in bed, no labored breathing on RA  HEENT: AT/NC, no facial asymmetry  Lungs: poor inspiration, no crackles  Heart: RRR  Abdomen: soft, non-tender  Extremities: warm, no edema,  no calf tenderness, no focal deficit.    Patient with stable HR/BP, endocrinology not recommending further work-up. Endocrinology follow-up as outpatient  Appreciate Psychiatry  FRANKI for safe dispo

## 2023-03-17 NOTE — PROGRESS NOTE ADULT - PROBLEM SELECTOR PLAN 4
Hgb 10.9 on admission , MCV 68.9. Some schistocytes present. Currently no signs of active bleeding (no hematochezia, melena, hemoptysis, hematuria). Iron studies showing Iron of 56, TIBC of 267, Ferritin of 33, and Iron % Saturation of 21. 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   Plan:   - trend CBC  - maintain active T&S (next due 3/19)   - transfuse if Hgb <7

## 2023-03-17 NOTE — PROGRESS NOTE ADULT - PROBLEM SELECTOR PLAN 5
Reports hx of HTN, SBP 160s on arrival. Reports being Rx'd anti-HTN meds in the past but does not known which and hasn't been taking. Pt is currently normotensive. On 3/16 on RMF, BP systolic of 139/62 with HR of 64.   Plan:   - Continue to monitor   - TSH normal at 1.25 with T4 of 0.966.

## 2023-03-18 DIAGNOSIS — R74.8 ABNORMAL LEVELS OF OTHER SERUM ENZYMES: ICD-10-CM

## 2023-03-18 LAB
ACTH STIM ACTH BASELINE: 62.6 PG/ML — SIGNIFICANT CHANGE UP (ref 7.2–63.3)
ACTH STIM CORTISOL 30 MIN: 9.7 UG/DL — SIGNIFICANT CHANGE UP
ACTH STIM CORTISOL 60 MIN: 9.7 UG/DL — SIGNIFICANT CHANGE UP
ACTH STIM CORTISOL BASELINE: 4.7 UG/DL — SIGNIFICANT CHANGE UP (ref 6–18.4)
ALBUMIN SERPL ELPH-MCNC: 3.3 G/DL — SIGNIFICANT CHANGE UP (ref 3.3–5)
ALP SERPL-CCNC: 239 U/L — HIGH (ref 40–120)
ALT FLD-CCNC: 76 U/L — HIGH (ref 10–45)
ANION GAP SERPL CALC-SCNC: 9 MMOL/L — SIGNIFICANT CHANGE UP (ref 5–17)
AST SERPL-CCNC: 125 U/L — HIGH (ref 10–40)
BILIRUB SERPL-MCNC: 0.2 MG/DL — SIGNIFICANT CHANGE UP (ref 0.2–1.2)
BLD GP AB SCN SERPL QL: NEGATIVE — SIGNIFICANT CHANGE UP
BUN SERPL-MCNC: 19 MG/DL — SIGNIFICANT CHANGE UP (ref 7–23)
CALCIUM SERPL-MCNC: 9.4 MG/DL — SIGNIFICANT CHANGE UP (ref 8.4–10.5)
CHLORIDE SERPL-SCNC: 106 MMOL/L — SIGNIFICANT CHANGE UP (ref 96–108)
CO2 SERPL-SCNC: 27 MMOL/L — SIGNIFICANT CHANGE UP (ref 22–31)
CREAT SERPL-MCNC: 1.26 MG/DL — SIGNIFICANT CHANGE UP (ref 0.5–1.3)
EGFR: 61 ML/MIN/1.73M2 — SIGNIFICANT CHANGE UP
GLUCOSE BLDC GLUCOMTR-MCNC: 135 MG/DL — HIGH (ref 70–99)
GLUCOSE BLDC GLUCOMTR-MCNC: 92 MG/DL — SIGNIFICANT CHANGE UP (ref 70–99)
GLUCOSE BLDC GLUCOMTR-MCNC: 96 MG/DL — SIGNIFICANT CHANGE UP (ref 70–99)
GLUCOSE SERPL-MCNC: 69 MG/DL — LOW (ref 70–99)
HCT VFR BLD CALC: 29.8 % — LOW (ref 39–50)
HGB BLD-MCNC: 9.1 G/DL — LOW (ref 13–17)
MAGNESIUM SERPL-MCNC: 2 MG/DL — SIGNIFICANT CHANGE UP (ref 1.6–2.6)
MCHC RBC-ENTMCNC: 20.9 PG — LOW (ref 27–34)
MCHC RBC-ENTMCNC: 30.5 GM/DL — LOW (ref 32–36)
MCV RBC AUTO: 68.5 FL — LOW (ref 80–100)
NRBC # BLD: 0 /100 WBCS — SIGNIFICANT CHANGE UP (ref 0–0)
PHOSPHATE SERPL-MCNC: 3.3 MG/DL — SIGNIFICANT CHANGE UP (ref 2.5–4.5)
PLATELET # BLD AUTO: 210 K/UL — SIGNIFICANT CHANGE UP (ref 150–400)
POTASSIUM SERPL-MCNC: 4.7 MMOL/L — SIGNIFICANT CHANGE UP (ref 3.5–5.3)
POTASSIUM SERPL-SCNC: 4.7 MMOL/L — SIGNIFICANT CHANGE UP (ref 3.5–5.3)
PROT SERPL-MCNC: 6.3 G/DL — SIGNIFICANT CHANGE UP (ref 6–8.3)
RBC # BLD: 4.35 M/UL — SIGNIFICANT CHANGE UP (ref 4.2–5.8)
RBC # FLD: 17.2 % — HIGH (ref 10.3–14.5)
RH IG SCN BLD-IMP: NEGATIVE — SIGNIFICANT CHANGE UP
SODIUM SERPL-SCNC: 142 MMOL/L — SIGNIFICANT CHANGE UP (ref 135–145)
WBC # BLD: 4.84 K/UL — SIGNIFICANT CHANGE UP (ref 3.8–10.5)
WBC # FLD AUTO: 4.84 K/UL — SIGNIFICANT CHANGE UP (ref 3.8–10.5)

## 2023-03-18 PROCEDURE — 99231 SBSQ HOSP IP/OBS SF/LOW 25: CPT | Mod: GC

## 2023-03-18 PROCEDURE — 93010 ELECTROCARDIOGRAM REPORT: CPT

## 2023-03-18 PROCEDURE — 99232 SBSQ HOSP IP/OBS MODERATE 35: CPT

## 2023-03-18 RX ORDER — COSYNTROPIN 0.25 MG/ML
0.25 INJECTION, SOLUTION INTRAVENOUS ONCE
Refills: 0 | Status: COMPLETED | OUTPATIENT
Start: 2023-03-18 | End: 2023-03-18

## 2023-03-18 RX ORDER — ARIPIPRAZOLE 15 MG/1
10 TABLET ORAL DAILY
Refills: 0 | Status: DISCONTINUED | OUTPATIENT
Start: 2023-03-18 | End: 2023-03-24

## 2023-03-18 RX ADMIN — COSYNTROPIN 0.25 MILLIGRAM(S): 0.25 INJECTION, SOLUTION INTRAVENOUS at 10:33

## 2023-03-18 RX ADMIN — PANTOPRAZOLE SODIUM 40 MILLIGRAM(S): 20 TABLET, DELAYED RELEASE ORAL at 05:55

## 2023-03-18 RX ADMIN — Medication 1 PATCH: at 11:14

## 2023-03-18 RX ADMIN — APIXABAN 10 MILLIGRAM(S): 2.5 TABLET, FILM COATED ORAL at 17:06

## 2023-03-18 RX ADMIN — Medication 1 PATCH: at 11:17

## 2023-03-18 RX ADMIN — ARIPIPRAZOLE 5 MILLIGRAM(S): 15 TABLET ORAL at 07:16

## 2023-03-18 RX ADMIN — Medication 1 PATCH: at 18:00

## 2023-03-18 RX ADMIN — APIXABAN 10 MILLIGRAM(S): 2.5 TABLET, FILM COATED ORAL at 05:54

## 2023-03-18 NOTE — PROGRESS NOTE ADULT - PROBLEM SELECTOR PLAN 4
Hgb 10.9 on admission , MCV 68.9. Some schistocytes present. Currently no signs of active bleeding (no hematochezia, melena, hemoptysis, hematuria). Iron studies showing Iron of 56, TIBC of 267, Ferritin of 33, and Iron % Saturation of 21. 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   Plan:   - trend CBC  - maintain active T&S (next due 3/19)   - transfuse if Hgb <7 Pt with BUN/Cr of 10/0.80 on 3/15 in afternoon with subsequent rise to 14/1.15 on 3/16. Pt was previously in distributive shock, was stabilized with Levophed ggt @ 0.05mcg/kg/min run and d/c @ 1500 on 3/15/23. Suspecting abrupt rise secondary to hypovolemia/hypoperfusion. Urine studies obtained 3/16 showing Urine sodium of 63 and UrCrea of 36. FeNa calculated to 1.4% suggestive of intrinsic etiology. Suspecting source to be from frequent episodes of hypoperfusion causing ischemic ATN.  Plan:   - Suspecting plateau of Cr followed by polyuria in ATN.   - Continue to trend CMPs daily   - Avoid nephrotoxic agents

## 2023-03-18 NOTE — PROGRESS NOTE ADULT - PROBLEM SELECTOR PLAN 6
Initially minimally interactive only nodding head, now returned to baselined MS (A+Ox3) and does remember events leading up to 911 being called. CT head neg for trauma/acute changes. Likely etiology hypothermia. Currently mentating well, AOx3. Some degree/element of confusion assessed on physical examination 3/17 (see Hx of GI bleed). 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 Pt's previous nursing home contacted by SW who mentioned Mr. Mathur went out for an appointment on 2/16 and "ran away". He was previously a resident there for 2 years.   Plan:   - Utox neg  - Pending Psychiatry consultation to aid in assessing capacity and for possible psychosis Reports hx of HTN, SBP 160s on arrival. Reports being Rx'd anti-HTN meds in the past but does not known which and hasn't been taking. Pt is currently normotensive. On 3/16 on RMF, BP systolic of 139/62 with HR of 64.   Plan:   - Continue to monitor   - TSH normal at 1.25 with T4 of 0.966.

## 2023-03-18 NOTE — PROGRESS NOTE ADULT - PROBLEM SELECTOR PLAN 1
3/15 CT chest showing RLL segmental PE w/o evidence of RV dysfunction. B/l lower extremity duplex studies showing no evidence of DVTs bilaterally. Pt started on Lovenox 70mg BID on 3/15.   Plan:   - Switching pt from Lovenox to Eliquis 3/17 (covered by pt's insurance) for treatment of PE  - f/u TTE to evaluate for R heart strain   - poor candidate for Coumadin 2/2 hx of medication non-compliance and undomiciled LFT steadily rising since 3/15; AlkPhos 139 ->239 (3/18), AST 47 -> 125 (3/18), ALT 47 -> 76 (3/18), while Tbili remained unchanged at 0.2. Possibly reactive to new medication abilify vs acute biliary process but unlikely given asxs, negative abd exam, and relative small increase in LFTs  - trend LFT  - hold off RUQ US

## 2023-03-18 NOTE — PROGRESS NOTE ADULT - PROBLEM SELECTOR PLAN 10
RESOLVED   p/w severe hypothermia w/ T81.5F rectally. TSH WNL. Most likely etiology environmental exposure  - s/p Active Internal Rewarming Protocol including bairhugger, warmed IVF and bladder irrigation (goal to increase T 2 degrees Celcius/hour until goal T 96.0F) - now normothermic      #Hypotension - RESOLVED   Likely etiology distributive shock 2/2 vasodilation from active rewarming. Lactate WNL.  - s/p active rewarming protocol, (goal temp 96F) and levophed gtt; off levophed gtt since 3/15 3pm     #Bradycardia - RESOLVED   Initially p/w HR 31 w/ [place] waves on EKG. Likely etiology hypothermia  - HR 60s-70s A1c 6 during this admission  Plan:   - f/u outpt

## 2023-03-18 NOTE — PROGRESS NOTE ADULT - SUBJECTIVE AND OBJECTIVE BOX
INTERVAL HPI/OVERNIGHT EVENTS:  O/N:  This morning: Patient was seen and examined at bedside.      VITAL SIGNS:  T(F): 98.5 (23 @ 05:39)  HR: 58 (23 @ 05:39)  BP: 137/70 (23 @ 05:39)  RR: 18 (23 @ 05:39)  SpO2: 99% (23 @ 05:39)  Wt(kg): --    I/O:    23 @ 07:01  -  23 @ 07:00  --------------------------------------------------------  IN: 0 mL / OUT: 1700 mL / NET: -1700 mL    23 @ 07:01  -  23 @ 06:30  --------------------------------------------------------  IN: 0 mL / OUT: 1650 mL / NET: -1650 mL        PHYSICAL EXAM:    Constitutional: NAD  HEENT: PERRL, EOMI, sclera non-icteric, neck supple, trachea midline, no masses, no JVD, MMM, good dentition  Respiratory: CTA b/l, good air entry b/l, no wheezing, no rhonchi, no rales, without accessory muscle use and no intercostal retractions  Cardiovascular: RRR, normal S1S2, no M/R/G  Gastrointestinal: abdomen soft, NTND, no masses palpable, BS normal  Extremities: Warm, well perfused, pulses equal bilateral upper and lower extremities, no edema, no clubbing  Neurological: AAOx3, CN Grossly intact  Skin: Normal temperature, warm, dry    MEDICATIONS  (STANDING):  apixaban 10 milliGRAM(s) Oral every 12 hours  ARIPiprazole 5 milliGRAM(s) Oral <User Schedule>  influenza  Vaccine (HIGH DOSE) 0.7 milliLiter(s) IntraMuscular once  nicotine - 21 mG/24Hr(s) Patch 1 Patch Transdermal daily  pantoprazole    Tablet 40 milliGRAM(s) Oral before breakfast    MEDICATIONS  (PRN):      Allergies    No Known Allergies    Intolerances        LABS:                        9.0    4.00  )-----------( 221      ( 17 Mar 2023 08:18 )             29.2     03-17    145  |  109<H>  |  16  ----------------------------<  65<L>  4.1   |  27  |  1.25    Ca    9.1      17 Mar 2023 08:18  Phos  3.0       Mg     2.0         TPro  6.2  /  Alb  2.9<L>  /  TBili  0.2  /  DBili  x   /  AST  71<H>  /  ALT  52<H>  /  AlkPhos  190<H>        Urinalysis Basic - ( 16 Mar 2023 14:46 )    Color: Yellow / Appearance: Clear / S.015 / pH: x  Gluc: x / Ketone: NEGATIVE  / Bili: Negative / Urobili: 0.2 E.U./dL   Blood: x / Protein: NEGATIVE mg/dL / Nitrite: NEGATIVE   Leuk Esterase: NEGATIVE / RBC: x / WBC x   Sq Epi: x / Non Sq Epi: x / Bacteria: x                RADIOLOGY & ADDITIONAL TESTS:  Reviewed INTERVAL HPI/OVERNIGHT EVENTS:  O/N: none  This morning: Patient was seen and examined at bedside. Resting in bed, NAD. Mild LE TTP but otherwise negative ROS, denies HA, cough, CP, SOB, pleuritic CP, abd pain, n/v/d, dysuria, hematuria. Explained ACTH stim test to patient and he is agreeable.     VITAL SIGNS:  T(F): 98.5 (23 @ 05:39)  HR: 58 (23 @ 05:39)  BP: 137/70 (23 @ 05:39)  RR: 18 (23 @ 05:39)  SpO2: 99% (23 @ 05:39)  Wt(kg): --    I/O:    23 @ 07:01  -  23 @ 07:00  --------------------------------------------------------  IN: 0 mL / OUT: 1700 mL / NET: -1700 mL    23 @ 07:01  -  23 @ 06:30  --------------------------------------------------------  IN: 0 mL / OUT: 1650 mL / NET: -1650 mL        PHYSICAL EXAM:    General: Alert and oriented x 3. No acute distress.  Eyes: EOMI. Anicteric. + clear discharge in lower eyelid   HEENT: Moist mucous membranes. No scleral icterus. No cervical lymphadenopathy. Missing teeth on upper and lower mandible   Lungs: No accessory muscle use. CTAB   Cardiovascular: Regular rate and rhythm. No murmur. No JVD.  Abdomen: Soft, non-tender and non-distended. No palpable masses. Ventral abdominal scar noted.   Extremities: Non-tender. B/L +1 pitting edema. Onychomycotic nails.   Skin: No rashes or lesions. Warm.  Neurologic: No focal neurological deficits. CN II-XII grossly intact, but not individually tested.  Psychiatric: Cooperative. Appropriate mood and affect.    MEDICATIONS  (STANDING):  apixaban 10 milliGRAM(s) Oral every 12 hours  ARIPiprazole 5 milliGRAM(s) Oral <User Schedule>  influenza  Vaccine (HIGH DOSE) 0.7 milliLiter(s) IntraMuscular once  nicotine - 21 mG/24Hr(s) Patch 1 Patch Transdermal daily  pantoprazole    Tablet 40 milliGRAM(s) Oral before breakfast    MEDICATIONS  (PRN):      Allergies    No Known Allergies    Intolerances        LABS:                        9.0    4.00  )-----------( 221      ( 17 Mar 2023 08:18 )             29.2     03-17    145  |  109<H>  |  16  ----------------------------<  65<L>  4.1   |  27  |  1.25    Ca    9.1      17 Mar 2023 08:18  Phos  3.0     03-17  Mg     2.0     -    TPro  6.2  /  Alb  2.9<L>  /  TBili  0.2  /  DBili  x   /  AST  71<H>  /  ALT  52<H>  /  AlkPhos  190<H>  -17      Urinalysis Basic - ( 16 Mar 2023 14:46 )    Color: Yellow / Appearance: Clear / S.015 / pH: x  Gluc: x / Ketone: NEGATIVE  / Bili: Negative / Urobili: 0.2 E.U./dL   Blood: x / Protein: NEGATIVE mg/dL / Nitrite: NEGATIVE   Leuk Esterase: NEGATIVE / RBC: x / WBC x   Sq Epi: x / Non Sq Epi: x / Bacteria: x                RADIOLOGY & ADDITIONAL TESTS:  Reviewed

## 2023-03-18 NOTE — BH CONSULTATION LIAISON PROGRESS NOTE - NSBHASSESSMENTFT_PSY_ALL_CORE
erxd 71yo man, homeless, history of psychosis, HTN, GI bleed, no known past psychiatric or medical care, BIBEMS for AMS, hypothermia, dizziness, admitted to MICU for rewarming protocol and workup revealed PE. QTc initially 524, improved to 481 on 3/16. Psychiatry consulted for evaluation of psychosis; patient found to exhibit delusional thought content on initial exam but otherwise cognitively and behaviorally appropriate. Abilify started for psychosis, patient adherent and tolerating well. Remains AOx3, no SI or HI. However, patient with overall concerning self-care given requirement for hospitalization.      RECOMMENDATIONS:  --No need for 1:1 at this time  --Will continue to consider need for acute inpatient admission  --Increase Abilify to 10mg daily for psychosis  --Repeat EKG to evaluate QTc; hold antipsychotics if QTc > 500ms

## 2023-03-18 NOTE — PROGRESS NOTE ADULT - PROBLEM SELECTOR PLAN 3
Pt with BUN/Cr of 10/0.80 on 3/15 in afternoon with subsequent rise to 14/1.15 on 3/16. Pt was previously in distributive shock, was stabilized with Levophed ggt @ 0.05mcg/kg/min run and d/c @ 1500 on 3/15/23. Suspecting abrupt rise secondary to hypovolemia/hypoperfusion. Urine studies obtained 3/16 showing Urine sodium of 63 and UrCrea of 36. FeNa calculated to 1.4% suggestive of intrinsic etiology. Suspecting source to be from frequent episodes of hypoperfusion causing ischemic ATN.  Plan:   - Suspecting plateau of Cr followed by polyuria in ATN.   - Continue to trend CMPs daily   - Avoid nephrotoxic agents P/w WBC 2.19 w/o shift. Unknown etiology. Non-toxic appearing, hypothermia, no clear evidence of infection. Urine leg neg, RVP neg. S/p 1 dose of Vanco 1g and Zosyn 3.375 in the ED, not continued. Urine Strep, legionella neg. MRSA negative. UA negative. Afebrile, VSS. Pt improving after > 48hr off abx.   Plan:   - Leukopenia closed on 3/17. Not likely to be d/2 infectious etiology.   - Continue to monitor off abx

## 2023-03-18 NOTE — PROGRESS NOTE ADULT - PROBLEM SELECTOR PLAN 2
P/w WBC 2.19 w/o shift. Unknown etiology. Non-toxic appearing, hypothermia, no clear evidence of infection. Urine leg neg, RVP neg. S/p 1 dose of Vanco 1g and Zosyn 3.375 in the ED, not continued. Urine Strep, legionella neg. MRSA negative. UA negative. Afebrile, VSS. Pt improving after > 48hr off abx.   Plan:   - Leukopenia closed on 3/17. Not likely to be d/2 infectious etiology.   - Continue to monitor off abx 3/15 CT chest showing RLL segmental PE w/o evidence of RV dysfunction. B/l lower extremity duplex studies showing no evidence of DVTs bilaterally. Pt started on Lovenox 70mg BID on 3/15.   Plan:   - c/w eliquis 10mg BID 3/17 to 3/24  - f/u TTE to evaluate for R heart strain   - poor candidate for Coumadin 2/2 hx of medication non-compliance and undomiciled

## 2023-03-18 NOTE — PROGRESS NOTE ADULT - PROBLEM SELECTOR PLAN 8
No evidence of DVTs on venous duplex studies performed inpatient. Pt noted to be hypoalbuminemic on admission.  Pending TTE. Suspecting low oncotic pressure as source of lower extremity edema, which has improved on examination 3/17  Plan:   - F/u TTE   - Consider RUQ US if TTE unremarkable   - HepC negative Pt reports hx of "gastric surgery for GI bleed" in 2017 which he believes was @[facility] but no documentation available. Reports previously taking Omeprazole but has not taken any Rx's in some time. Hb 10.9 on admission.  No evidence of GIB at this time, pt denies recent melena/hematochezia/hematemesis. Per note at [facility] in 2016, pt seen by Dr Robin Bland however no documentation found. On AM of 3/17, pt saying his GI surgery was at [facility] 2 years ago -- again no documentation found on [facility]. No objective findings of complications from GI surgery.   Plan:   - c/w Pantoprazole 40mg PO QD

## 2023-03-18 NOTE — PROGRESS NOTE ADULT - PROBLEM SELECTOR PLAN 9
A1c 6 during this admission  Plan:   - f/u outpt No evidence of DVTs on venous duplex studies performed inpatient. Pt noted to be hypoalbuminemic on admission.  Pending TTE. Suspecting low oncotic pressure as source of lower extremity edema, which has improved on examination 3/17  Plan:   - F/u TTE   - Consider RUQ US if TTE unremarkable   - HepC negative

## 2023-03-18 NOTE — PROGRESS NOTE ADULT - PROBLEM SELECTOR PLAN 7
Pt reports hx of "gastric surgery for GI bleed" in 2017 which he believes was @[facility] but no documentation available. Reports previously taking Omeprazole but has not taken any Rx's in some time. Hb 10.9 on admission.  No evidence of GIB at this time, pt denies recent melena/hematochezia/hematemesis. Per note at [facility] in 2016, pt seen by Dr Robin Bland however no documentation found. On AM of 3/17, pt saying his GI surgery was at [facility] 2 years ago -- again no documentation found on [facility]. No objective findings of complications from GI surgery.   Plan:   - c/w Pantoprazole 40mg PO QD Initially minimally interactive only nodding head, now returned to baselined MS (A+Ox3) and does remember events leading up to 911 being called. CT head neg for trauma/acute changes. Likely etiology hypothermia. Currently mentating well, AOx3. Some degree/element of confusion assessed on physical examination 3/17 (see Hx of GI bleed). 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 Pt's previous nursing home contacted by SW who mentioned Mr. Mathur went out for an appointment on 2/16 and "ran away". He was previously a resident there for 2 years.   Plan:   - Utox neg  - Per psych, increase Abilify 5>10mg daily   - monitor LFTs  - repeat EKG

## 2023-03-18 NOTE — PROGRESS NOTE ADULT - PROBLEM SELECTOR PLAN 5
Reports hx of HTN, SBP 160s on arrival. Reports being Rx'd anti-HTN meds in the past but does not known which and hasn't been taking. Pt is currently normotensive. On 3/16 on RMF, BP systolic of 139/62 with HR of 64.   Plan:   - Continue to monitor   - TSH normal at 1.25 with T4 of 0.966. Hgb 10.9 on admission , MCV 68.9. Some schistocytes present. Currently no signs of active bleeding (no hematochezia, melena, hemoptysis, hematuria). Iron studies showing Iron of 56, TIBC of 267, Ferritin of 33, and Iron % Saturation of 21. 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   Plan:   - trend CBC  - maintain active T&S (next due 3/19)   - transfuse if Hgb <7

## 2023-03-19 LAB
ALBUMIN SERPL ELPH-MCNC: 3.1 G/DL — LOW (ref 3.3–5)
ALP SERPL-CCNC: 258 U/L — HIGH (ref 40–120)
ALT FLD-CCNC: 85 U/L — HIGH (ref 10–45)
ANION GAP SERPL CALC-SCNC: 9 MMOL/L — SIGNIFICANT CHANGE UP (ref 5–17)
AST SERPL-CCNC: 132 U/L — HIGH (ref 10–40)
BILIRUB SERPL-MCNC: 0.2 MG/DL — SIGNIFICANT CHANGE UP (ref 0.2–1.2)
BUN SERPL-MCNC: 22 MG/DL — SIGNIFICANT CHANGE UP (ref 7–23)
CALCIUM SERPL-MCNC: 9.3 MG/DL — SIGNIFICANT CHANGE UP (ref 8.4–10.5)
CHLORIDE SERPL-SCNC: 100 MMOL/L — SIGNIFICANT CHANGE UP (ref 96–108)
CO2 SERPL-SCNC: 26 MMOL/L — SIGNIFICANT CHANGE UP (ref 22–31)
CREAT SERPL-MCNC: 1.24 MG/DL — SIGNIFICANT CHANGE UP (ref 0.5–1.3)
EGFR: 62 ML/MIN/1.73M2 — SIGNIFICANT CHANGE UP
GLUCOSE BLDC GLUCOMTR-MCNC: 108 MG/DL — HIGH (ref 70–99)
GLUCOSE BLDC GLUCOMTR-MCNC: 80 MG/DL — SIGNIFICANT CHANGE UP (ref 70–99)
GLUCOSE BLDC GLUCOMTR-MCNC: 96 MG/DL — SIGNIFICANT CHANGE UP (ref 70–99)
GLUCOSE BLDC GLUCOMTR-MCNC: 99 MG/DL — SIGNIFICANT CHANGE UP (ref 70–99)
GLUCOSE BLDC GLUCOMTR-MCNC: 99 MG/DL — SIGNIFICANT CHANGE UP (ref 70–99)
GLUCOSE SERPL-MCNC: 71 MG/DL — SIGNIFICANT CHANGE UP (ref 70–99)
HCT VFR BLD CALC: 29.4 % — LOW (ref 39–50)
HGB BLD-MCNC: 9.1 G/DL — LOW (ref 13–17)
MAGNESIUM SERPL-MCNC: 1.9 MG/DL — SIGNIFICANT CHANGE UP (ref 1.6–2.6)
MCHC RBC-ENTMCNC: 21 PG — LOW (ref 27–34)
MCHC RBC-ENTMCNC: 31 GM/DL — LOW (ref 32–36)
MCV RBC AUTO: 67.9 FL — LOW (ref 80–100)
NRBC # BLD: 0 /100 WBCS — SIGNIFICANT CHANGE UP (ref 0–0)
PHOSPHATE SERPL-MCNC: 3.6 MG/DL — SIGNIFICANT CHANGE UP (ref 2.5–4.5)
PLATELET # BLD AUTO: 197 K/UL — SIGNIFICANT CHANGE UP (ref 150–400)
POTASSIUM SERPL-MCNC: 4.6 MMOL/L — SIGNIFICANT CHANGE UP (ref 3.5–5.3)
POTASSIUM SERPL-SCNC: 4.6 MMOL/L — SIGNIFICANT CHANGE UP (ref 3.5–5.3)
PROT SERPL-MCNC: 6.3 G/DL — SIGNIFICANT CHANGE UP (ref 6–8.3)
RBC # BLD: 4.33 M/UL — SIGNIFICANT CHANGE UP (ref 4.2–5.8)
RBC # FLD: 16.6 % — HIGH (ref 10.3–14.5)
SODIUM SERPL-SCNC: 135 MMOL/L — SIGNIFICANT CHANGE UP (ref 135–145)
WBC # BLD: 5.18 K/UL — SIGNIFICANT CHANGE UP (ref 3.8–10.5)
WBC # FLD AUTO: 5.18 K/UL — SIGNIFICANT CHANGE UP (ref 3.8–10.5)

## 2023-03-19 PROCEDURE — 76705 ECHO EXAM OF ABDOMEN: CPT | Mod: 26

## 2023-03-19 PROCEDURE — 99232 SBSQ HOSP IP/OBS MODERATE 35: CPT | Mod: GC

## 2023-03-19 RX ADMIN — APIXABAN 10 MILLIGRAM(S): 2.5 TABLET, FILM COATED ORAL at 06:14

## 2023-03-19 RX ADMIN — PANTOPRAZOLE SODIUM 40 MILLIGRAM(S): 20 TABLET, DELAYED RELEASE ORAL at 06:14

## 2023-03-19 RX ADMIN — Medication 1 PATCH: at 18:28

## 2023-03-19 RX ADMIN — ARIPIPRAZOLE 10 MILLIGRAM(S): 15 TABLET ORAL at 11:33

## 2023-03-19 RX ADMIN — Medication 1 PATCH: at 06:11

## 2023-03-19 RX ADMIN — APIXABAN 10 MILLIGRAM(S): 2.5 TABLET, FILM COATED ORAL at 17:24

## 2023-03-19 RX ADMIN — Medication 1 PATCH: at 11:32

## 2023-03-19 RX ADMIN — Medication 1 TABLET(S): at 17:25

## 2023-03-19 RX ADMIN — Medication 1 PATCH: at 11:40

## 2023-03-19 NOTE — DIETITIAN INITIAL EVALUATION ADULT - PERTINENT MEDS FT
MEDICATIONS  (STANDING):  apixaban 10 milliGRAM(s) Oral every 12 hours  ARIPiprazole 10 milliGRAM(s) Oral daily  influenza  Vaccine (HIGH DOSE) 0.7 milliLiter(s) IntraMuscular once  nicotine - 21 mG/24Hr(s) Patch 1 Patch Transdermal daily  pantoprazole    Tablet 40 milliGRAM(s) Oral before breakfast    MEDICATIONS  (PRN):

## 2023-03-19 NOTE — PROGRESS NOTE ADULT - PROBLEM SELECTOR PLAN 7
Initially minimally interactive only nodding head, now returned to baselined MS (A+Ox3) and does remember events leading up to 911 being called. CT head neg for trauma/acute changes. Likely etiology hypothermia. Currently mentating well, AOx3. Some degree/element of confusion assessed on physical examination 3/17 (see Hx of GI bleed). 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 Pt's previous nursing home contacted by SW who mentioned Mr. Mathur went out for an appointment on 2/16 and "ran away". He was previously a resident there for 2 years.   Plan:   - Utox neg  - Per psych, increase Abilify 5>10mg daily   - monitor LFTs  - repeat EKG

## 2023-03-19 NOTE — PROGRESS NOTE ADULT - ASSESSMENT
INTERVAL HPI/OVERNIGHT EVENTS: LIZA o/n. No new complaints this AM.     VITAL SIGNS:  T(F): 98.6 (03-19-23 @ 12:15)  HR: 57 (03-19-23 @ 12:15)  BP: 117/61 (03-19-23 @ 12:15)  RR: 18 (03-19-23 @ 12:15)  SpO2: 96% (03-19-23 @ 12:15)  Wt(kg): --    PHYSICAL EXAM:      Constitutional: NAD, well-groomed, well-developed  HEENT: PERRLA, EOMI, Normal Hearing, MMM  Neck: No LAD, No JVD  Back: Normal spine flexure, No CVA tenderness  Respiratory: CTABCardiovascular: S1 and S2, RRR, no M/G/R  Gastrointestinal: BS+, soft, NT/ND  Extremities: No peripheral edema  Vascular: 2+ peripheral pulses  Neurological: A/O x 3, no focal deficits  Psychiatric: Normal mood, normal affect  Musculoskeletal: 5/5 strength b/l upper and lower extremities  Skin: No rashes      MEDICATIONS  (STANDING):  apixaban 10 milliGRAM(s) Oral every 12 hours  ARIPiprazole 10 milliGRAM(s) Oral daily  influenza  Vaccine (HIGH DOSE) 0.7 milliLiter(s) IntraMuscular once  nicotine - 21 mG/24Hr(s) Patch 1 Patch Transdermal daily  pantoprazole    Tablet 40 milliGRAM(s) Oral before breakfast    MEDICATIONS  (PRN):      Allergies    No Known Allergies    Intolerances        LABS:                        9.1    5.18  )-----------( 197      ( 19 Mar 2023 07:39 )             29.4     03-19    135  |  100  |  22  ----------------------------<  71  4.6   |  26  |  1.24    Ca    9.3      19 Mar 2023 07:39  Phos  3.6     03-19  Mg     1.9     03-19    TPro  6.3  /  Alb  3.1<L>  /  TBili  0.2  /  DBili  x   /  AST  132<H>  /  ALT  85<H>  /  AlkPhos  258<H>  03-19          RADIOLOGY & ADDITIONAL TESTS:

## 2023-03-19 NOTE — PROGRESS NOTE ADULT - PROBLEM SELECTOR PLAN 9
No evidence of DVTs on venous duplex studies performed inpatient. Pt noted to be hypoalbuminemic on admission.  Pending TTE. Suspecting low oncotic pressure as source of lower extremity edema, which has improved on examination 3/17  Plan:   - F/u TTE   - Consider RUQ US if TTE unremarkable   - HepC negative

## 2023-03-19 NOTE — DIETITIAN INITIAL EVALUATION ADULT - PERTINENT LABORATORY DATA
03-19    135  |  100  |  22  ----------------------------<  71  4.6   |  26  |  1.24    Ca    9.3      19 Mar 2023 07:39  Phos  3.6     03-19  Mg     1.9     03-19    TPro  6.3  /  Alb  3.1<L>  /  TBili  0.2  /  DBili  x   /  AST  132<H>  /  ALT  85<H>  /  AlkPhos  258<H>  03-19  POCT Blood Glucose.: 108 mg/dL (03-19-23 @ 12:51)  A1C with Estimated Average Glucose Result: 6.0 % (03-15-23 @ 09:11)

## 2023-03-19 NOTE — PROGRESS NOTE ADULT - PROBLEM SELECTOR PLAN 3
P/w WBC 2.19 w/o shift. Unknown etiology. Non-toxic appearing, hypothermia, no clear evidence of infection. Urine leg neg, RVP neg. S/p 1 dose of Vanco 1g and Zosyn 3.375 in the ED, not continued. Urine Strep, legionella neg. MRSA negative. UA negative. Afebrile, VSS. Pt improving after > 48hr off abx.   Plan:   - Leukopenia closed on 3/17. Not likely to be d/2 infectious etiology.   - Continue to monitor off abx

## 2023-03-19 NOTE — PROGRESS NOTE ADULT - PROBLEM SELECTOR PLAN 1
Suspect possible DILI 2/2 abilify (<1% incidence). No previous hepatic pathology on previous imaging. Plan for repeat abd u/s but may need to address therapeutic change with behavioral health.

## 2023-03-19 NOTE — DIETITIAN INITIAL EVALUATION ADULT - ADD RECOMMEND
1. Recommend addition of Ensure 1x/d (350kcal, 20g pro) and MVI w/minerals   2. Encourage PO intake   3. Monitor lytes and replete prn  4. Pain and bowel regimens per team   *paged MD to discuss

## 2023-03-19 NOTE — DIETITIAN INITIAL EVALUATION ADULT - OTHER INFO
71 y/o undomiciled Male current smoker (1ppd x50+yrs), w/ PSHx of unspecified GI surgery reportedly for GIB, and PMHx of HTN who was initially BIBEMS for dizziness, inability to walk and AMS, was found to be profoundly hypothermic, admitted to MICU for active rewarming protocol and levophed gtt for vasodilation while rewarming, now normothermic, AAOx3, off levophed gtt. Hospital course complicated by PE, started on full dose lovenox. Stepped down to RMF for further management. Pt seen in room, awake, alert, pleasant. Breathing comfortably on room air. Pt reported that he gets food "from stores", [employer], and receives handouts on the street. Feels that he is able to eat enough during the day. NKFA. Currently w/good appetite and intake, consuming >50% of most meals. No N/V/C/D reported at this time. BM 3/18. Denied complaints of pain. Skin intact pressure-wise, with noted B/L LE 2+ edema. Afebrile. Diet ed not appropriate given pt's SES and limited ability to procure food. Encouraged healthy options where available. Pt amenable to trying oral nutrition supplementation. Will continue to follow per RD protocol.

## 2023-03-19 NOTE — PROGRESS NOTE ADULT - PROBLEM SELECTOR PLAN 2
3/15 CT chest showing RLL segmental PE w/o evidence of RV dysfunction. B/l lower extremity duplex studies showing no evidence of DVTs bilaterally. Pt started on Lovenox 70mg BID on 3/15.   Plan:   - c/w eliquis 10mg BID 3/17 to 3/24  - f/u TTE to evaluate for R heart strain   - poor candidate for Coumadin 2/2 hx of medication non-compliance and undomiciled

## 2023-03-19 NOTE — PROGRESS NOTE ADULT - PROBLEM SELECTOR PLAN 8
Pt reports hx of "gastric surgery for GI bleed" in 2017 which he believes was @[facility] but no documentation available. Reports previously taking Omeprazole but has not taken any Rx's in some time. Hb 10.9 on admission.  No evidence of GIB at this time, pt denies recent melena/hematochezia/hematemesis. Per note at [facility] in 2016, pt seen by Dr Robin Bland however no documentation found. On AM of 3/17, pt saying his GI surgery was at [facility] 2 years ago -- again no documentation found on [facility]. No objective findings of complications from GI surgery.   Plan:   - c/w Pantoprazole 40mg PO QD

## 2023-03-20 LAB
ALBUMIN SERPL ELPH-MCNC: 3 G/DL — LOW (ref 3.3–5)
ALP SERPL-CCNC: 255 U/L — HIGH (ref 40–120)
ALT FLD-CCNC: 62 U/L — HIGH (ref 10–45)
ANION GAP SERPL CALC-SCNC: 10 MMOL/L — SIGNIFICANT CHANGE UP (ref 5–17)
ANISOCYTOSIS BLD QL: SIGNIFICANT CHANGE UP
AST SERPL-CCNC: 64 U/L — HIGH (ref 10–40)
BASOPHILS # BLD AUTO: 0.05 K/UL — SIGNIFICANT CHANGE UP (ref 0–0.2)
BASOPHILS NFR BLD AUTO: 0.9 % — SIGNIFICANT CHANGE UP (ref 0–2)
BILIRUB DIRECT SERPL-MCNC: <0.2 MG/DL — SIGNIFICANT CHANGE UP (ref 0–0.3)
BILIRUB INDIRECT FLD-MCNC: SIGNIFICANT CHANGE UP (ref 0.2–1)
BILIRUB SERPL-MCNC: 0.2 MG/DL — SIGNIFICANT CHANGE UP (ref 0.2–1.2)
BUN SERPL-MCNC: 26 MG/DL — HIGH (ref 7–23)
CALCIUM SERPL-MCNC: 9.4 MG/DL — SIGNIFICANT CHANGE UP (ref 8.4–10.5)
CHLORIDE SERPL-SCNC: 99 MMOL/L — SIGNIFICANT CHANGE UP (ref 96–108)
CO2 SERPL-SCNC: 23 MMOL/L — SIGNIFICANT CHANGE UP (ref 22–31)
CREAT SERPL-MCNC: 1.14 MG/DL — SIGNIFICANT CHANGE UP (ref 0.5–1.3)
CULTURE RESULTS: SIGNIFICANT CHANGE UP
CULTURE RESULTS: SIGNIFICANT CHANGE UP
DACRYOCYTES BLD QL SMEAR: SLIGHT — SIGNIFICANT CHANGE UP
EGFR: 68 ML/MIN/1.73M2 — SIGNIFICANT CHANGE UP
EOSINOPHIL # BLD AUTO: 0.1 K/UL — SIGNIFICANT CHANGE UP (ref 0–0.5)
EOSINOPHIL NFR BLD AUTO: 1.8 % — SIGNIFICANT CHANGE UP (ref 0–6)
GIANT PLATELETS BLD QL SMEAR: PRESENT — SIGNIFICANT CHANGE UP
GLUCOSE BLDC GLUCOMTR-MCNC: 110 MG/DL — HIGH (ref 70–99)
GLUCOSE BLDC GLUCOMTR-MCNC: 116 MG/DL — HIGH (ref 70–99)
GLUCOSE SERPL-MCNC: 112 MG/DL — HIGH (ref 70–99)
HCT VFR BLD CALC: 33.3 % — LOW (ref 39–50)
HGB BLD-MCNC: 10.2 G/DL — LOW (ref 13–17)
HYPOCHROMIA BLD QL: SIGNIFICANT CHANGE UP
LYMPHOCYTES # BLD AUTO: 1.76 K/UL — SIGNIFICANT CHANGE UP (ref 1–3.3)
LYMPHOCYTES # BLD AUTO: 31.2 % — SIGNIFICANT CHANGE UP (ref 13–44)
MACROCYTES BLD QL: SLIGHT — SIGNIFICANT CHANGE UP
MAGNESIUM SERPL-MCNC: 2 MG/DL — SIGNIFICANT CHANGE UP (ref 1.6–2.6)
MANUAL SMEAR VERIFICATION: SIGNIFICANT CHANGE UP
MCHC RBC-ENTMCNC: 20.7 PG — LOW (ref 27–34)
MCHC RBC-ENTMCNC: 30.6 GM/DL — LOW (ref 32–36)
MCV RBC AUTO: 67.5 FL — LOW (ref 80–100)
MICROCYTES BLD QL: SIGNIFICANT CHANGE UP
MONOCYTES # BLD AUTO: 0.66 K/UL — SIGNIFICANT CHANGE UP (ref 0–0.9)
MONOCYTES NFR BLD AUTO: 11.6 % — SIGNIFICANT CHANGE UP (ref 2–14)
NEUTROPHILS # BLD AUTO: 3.03 K/UL — SIGNIFICANT CHANGE UP (ref 1.8–7.4)
NEUTROPHILS NFR BLD AUTO: 53.6 % — SIGNIFICANT CHANGE UP (ref 43–77)
OVALOCYTES BLD QL SMEAR: SLIGHT — SIGNIFICANT CHANGE UP
PHOSPHATE SERPL-MCNC: 3.8 MG/DL — SIGNIFICANT CHANGE UP (ref 2.5–4.5)
PLAT MORPH BLD: ABNORMAL
PLATELET # BLD AUTO: 198 K/UL — SIGNIFICANT CHANGE UP (ref 150–400)
POIKILOCYTOSIS BLD QL AUTO: SIGNIFICANT CHANGE UP
POLYCHROMASIA BLD QL SMEAR: SLIGHT — SIGNIFICANT CHANGE UP
POTASSIUM SERPL-MCNC: 4.5 MMOL/L — SIGNIFICANT CHANGE UP (ref 3.5–5.3)
POTASSIUM SERPL-SCNC: 4.5 MMOL/L — SIGNIFICANT CHANGE UP (ref 3.5–5.3)
PROT SERPL-MCNC: 7.4 G/DL — SIGNIFICANT CHANGE UP (ref 6–8.3)
RBC # BLD: 4.93 M/UL — SIGNIFICANT CHANGE UP (ref 4.2–5.8)
RBC # FLD: 17 % — HIGH (ref 10.3–14.5)
RBC BLD AUTO: ABNORMAL
SCHISTOCYTES BLD QL AUTO: SLIGHT — SIGNIFICANT CHANGE UP
SODIUM SERPL-SCNC: 132 MMOL/L — LOW (ref 135–145)
SPECIMEN SOURCE: SIGNIFICANT CHANGE UP
SPECIMEN SOURCE: SIGNIFICANT CHANGE UP
SPHEROCYTES BLD QL SMEAR: SLIGHT — SIGNIFICANT CHANGE UP
TARGETS BLD QL SMEAR: SIGNIFICANT CHANGE UP
VARIANT LYMPHS # BLD: 0.9 % — SIGNIFICANT CHANGE UP (ref 0–6)
WBC # BLD: 5.65 K/UL — SIGNIFICANT CHANGE UP (ref 3.8–10.5)
WBC # FLD AUTO: 5.65 K/UL — SIGNIFICANT CHANGE UP (ref 3.8–10.5)

## 2023-03-20 PROCEDURE — 99222 1ST HOSP IP/OBS MODERATE 55: CPT | Mod: GC

## 2023-03-20 PROCEDURE — 99233 SBSQ HOSP IP/OBS HIGH 50: CPT

## 2023-03-20 PROCEDURE — 93306 TTE W/DOPPLER COMPLETE: CPT | Mod: 26

## 2023-03-20 PROCEDURE — 93010 ELECTROCARDIOGRAM REPORT: CPT

## 2023-03-20 RX ORDER — APIXABAN 2.5 MG/1
10 TABLET, FILM COATED ORAL
Refills: 0 | Status: COMPLETED | OUTPATIENT
Start: 2023-03-20 | End: 2023-03-22

## 2023-03-20 RX ORDER — APIXABAN 2.5 MG/1
5 TABLET, FILM COATED ORAL EVERY 12 HOURS
Refills: 0 | Status: DISCONTINUED | OUTPATIENT
Start: 2023-03-23 | End: 2023-03-24

## 2023-03-20 RX ADMIN — Medication 1 PATCH: at 19:15

## 2023-03-20 RX ADMIN — Medication 1 PATCH: at 11:34

## 2023-03-20 RX ADMIN — APIXABAN 10 MILLIGRAM(S): 2.5 TABLET, FILM COATED ORAL at 19:12

## 2023-03-20 RX ADMIN — Medication 1 PATCH: at 07:37

## 2023-03-20 RX ADMIN — Medication 1 PATCH: at 11:49

## 2023-03-20 RX ADMIN — APIXABAN 10 MILLIGRAM(S): 2.5 TABLET, FILM COATED ORAL at 06:34

## 2023-03-20 RX ADMIN — PANTOPRAZOLE SODIUM 40 MILLIGRAM(S): 20 TABLET, DELAYED RELEASE ORAL at 07:56

## 2023-03-20 RX ADMIN — ARIPIPRAZOLE 10 MILLIGRAM(S): 15 TABLET ORAL at 11:34

## 2023-03-20 RX ADMIN — Medication 1 TABLET(S): at 11:35

## 2023-03-20 NOTE — PROGRESS NOTE ADULT - PROBLEM SELECTOR PLAN 6
Reports hx of HTN, SBP 160s on arrival. Reports being Rx'd anti-HTN meds in the past but does not known which and hasn't been taking. Pt is currently normotensive. On 3/16 on RMF, BP systolic of 139/62 with HR of 64.   Plan:   - Continue to monitor   - TSH normal at 1.25 with T4 of 0.966. Pt reports hx of "gastric surgery for GI bleed" in 2017 which he believes was @[facility] but no documentation available. Reports previously taking Omeprazole but has not taken any Rx's in some time. Hb 10.9 on admission.  No evidence of GIB at this time, pt denies recent melena/hematochezia/hematemesis. Per note at [facility] in 2016, pt seen by Dr Robin Bland however no documentation found. On AM of 3/17, pt saying his GI surgery was at [facility] 2 years ago -- again no documentation found on [facility]. No objective findings of complications from GI surgery.   Plan:   - c/w Pantoprazole 40mg PO QD

## 2023-03-20 NOTE — PROGRESS NOTE ADULT - PROBLEM SELECTOR PLAN 2
3/15 CT chest showing RLL segmental PE w/o evidence of RV dysfunction. B/l lower extremity duplex studies showing no evidence of DVTs bilaterally. Pt started on Lovenox 70mg BID on 3/15.   Plan:   - c/w eliquis 10mg BID 3/17 to 3/24  - f/u TTE to evaluate for R heart strain   - poor candidate for Coumadin 2/2 hx of medication non-compliance and undomiciled 3/15 CT chest showing RLL segmental PE w/o evidence of RV dysfunction. B/l lower extremity duplex studies showing no evidence of DVTs bilaterally. Pt started on Lovenox 70mg BID on 3/15.   Plan:   - c/w eliquis 10mg BID 3/17 to 3/22, as pt has had Lovenox 3/15-3/17  - f/u TTE to evaluate for R heart strain   - poor candidate for Coumadin 2/2 hx of medication non-compliance and undomiciled

## 2023-03-20 NOTE — CONSULT NOTE ADULT - ATTENDING COMMENTS
Pt seen on rounds this afternoon.  72-yo man who was brought into the ED after being found on the street with altered MS.  Was severely hypothermic 81 F). mildly bradycardic, and mildly hypertensive.  He is a poor historian, gives a history of abdominal surgery (after being asked about the midline scar), but is unclear about the reason or what was done.  Describes hematochezia at that time, and uses a word somewhat close to diverticulosis, so it is possible that it was an exploratory lap for diverticular bleeding.  He was also apparently hospitalized recently at [facility]--details unclear.    His TFTs were normal on admission, but he also had an ACTH stim test performed because of an inappropriately normal serum cortisol of 5.4 mcg/dl.  This showed a subnormal response (4.7 to 9.7 mcg/dl).  HIs baseline ACTH was borderline elevated at 62 pg/ml.    CT scan does not show any abnormalities in the adrenals.  At this point we are unable to explain the partial adrenal insufficiency, which appears to be primary, not pituitary based.  It is possible that the cortisol  levels reflect partial recovery from previous adrenal suppression (rhonda if he received medications during his recent hospitalization elsewhere that might have affected adrenal function).  He did not actually manifest any clinical signs of significant adrenal insufficiency on admission or since.  Hypothermia is not a typical sign---most pts with adrenal crisis are febrile.  He was also hyper--not hypotensive.  Will therefore hold off on replacement therapy given his current clinical status.  Would repeat an AM cortisol and ACTH tomorrow AM.  Would not want to start replacement if not clearly necessary, since this might create iatrogenic adrenal suppression in a pt who will not likely be consistent in taking the prescribed hydrocortisone replacement. Pt seen on rounds this afternoon.  72-yo man who was brought into the ED after being found on the street with altered MS.  Was severely hypothermic 81 F). mildly bradycardic, and mildly hypertensive.  He is a poor historian, gives a history of abdominal surgery (after being asked about the midline scar), but is unclear about the reason or what was done.  Describes ?? hematochezia at that time, and uses a word somewhat close to diverticulosis, so it is possible that it was an exploratory lap for diverticular bleeding.  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 (It was unlikely to be for CA, since the CT notes the presence of both adrenal glands).  He was also apparently hospitalized recently at [facility]--details unclear.    His TFTs were normal on admission, but he also had an ACTH stim test performed because of an inappropriately normal serum cortisol of 5.4 mcg/dl.  This showed a subnormal response (4.7 to 9.7 mcg/dl).  HIs baseline ACTH was borderline elevated at 62 pg/ml.    CT scan does not show any abnormalities in the adrenals.  At this point we are unable to explain the partial adrenal insufficiency, which appears to be primary, not pituitary based.  It is possible that the cortisol  levels reflect partial recovery from previous adrenal suppression (rhonda if he received medications during his recent hospitalization elsewhere that might have affected adrenal function).  He did not actually manifest any clinical signs of significant adrenal insufficiency on admission or since.  Hypothermia is not a typical sign---most pts with adrenal crisis are febrile.  He was also hyper--not hypotensive.  Will therefore hold off on replacement therapy given his current clinical status.  Would repeat an AM cortisol and ACTH tomorrow AM.  Would not want to start replacement if not clearly necessary, since this might create iatrogenic adrenal suppression in a pt who will not likely be consistent in taking the prescribed hydrocortisone replacement.

## 2023-03-20 NOTE — PROGRESS NOTE ADULT - PROBLEM SELECTOR PLAN 4
Pt with BUN/Cr of 10/0.80 on 3/15 in afternoon with subsequent rise to 14/1.15 on 3/16. Pt was previously in distributive shock, was stabilized with Levophed ggt @ 0.05mcg/kg/min run and d/c @ 1500 on 3/15/23. Suspecting abrupt rise secondary to hypovolemia/hypoperfusion. Urine studies obtained 3/16 showing Urine sodium of 63 and UrCrea of 36. FeNa calculated to 1.4% suggestive of intrinsic etiology. Suspecting source to be from frequent episodes of hypoperfusion causing ischemic ATN.  Plan:   - Suspecting plateau of Cr followed by polyuria in ATN.   - Continue to trend CMPs daily   - Avoid nephrotoxic agents Pt with BUN/Cr of 10/0.80 on 3/15 in afternoon with subsequent rise to 14/1.15 on 3/16. Pt was previously in distributive shock, was stabilized with Levophed ggt @ 0.05mcg/kg/min run and d/c @ 1500 on 3/15/23. Suspecting abrupt rise secondary to hypovolemia/hypoperfusion. Urine studies obtained 3/16 showing Urine sodium of 63 and UrCrea of 36. FeNa calculated to 1.4% suggestive of intrinsic etiology. Suspecting source to be from frequent episodes of hypoperfusion causing ischemic ATN. BUN/Cr improving 3/20.   Plan:   - Suspecting plateau of Cr followed by polyuria in ATN.   - Continue to trend CMPs daily   - Avoid nephrotoxic agents

## 2023-03-20 NOTE — PROGRESS NOTE ADULT - SUBJECTIVE AND OBJECTIVE BOX
CC: Patient is a 72y old  Male who presents with a chief complaint of INFECTION ASSOCIATED W/HYPOTHERMIA    INTERVAL EVENTS: LIZA  SUBJECTIVE / INTERVAL HPI: Patient seen and examined at bedside.   ROS: negative unless otherwise stated above.    VITAL SIGNS:  Vital Signs Last 24 Hrs  T(C): 36.7 (20 Mar 2023 06:14), Max: 37.2 (19 Mar 2023 21:13)  T(F): 98.1 (20 Mar 2023 06:14), Max: 98.9 (19 Mar 2023 21:13)  HR: 51 (20 Mar 2023 06:14) (51 - 57)  BP: 127/76 (20 Mar 2023 06:14) (105/60 - 127/76)  BP(mean): --  RR: 17 (20 Mar 2023 06:14) (16 - 18)  SpO2: 97% (20 Mar 2023 06:14) (96% - 97%)    Parameters below as of 20 Mar 2023 06:14  Patient On (Oxygen Delivery Method): room air          03-18-23 @ 07:01  -  03-19-23 @ 07:00  --------------------------------------------------------  IN: 0 mL / OUT: 1100 mL / NET: -1100 mL    03-19-23 @ 07:01  -  03-20-23 @ 06:28  --------------------------------------------------------  IN: 100 mL / OUT: 3150 mL / NET: -3050 mL        PHYSICAL EXAM:    General: NAD  HEENT: MMM  Neck: supple  Cardiovascular: +S1/S2; RRR  Respiratory: CTA B/L; no W/R/R  Gastrointestinal: soft, NT/ND  Extremities: WWP; no edema, clubbing or cyanosis  Vascular: 2+ radial, DP/PT pulses B/L  Neurological: AAOx3; no focal deficits    MEDICATIONS:  MEDICATIONS  (STANDING):  apixaban 10 milliGRAM(s) Oral every 12 hours  ARIPiprazole 10 milliGRAM(s) Oral daily  influenza  Vaccine (HIGH DOSE) 0.7 milliLiter(s) IntraMuscular once  multivitamin 1 Tablet(s) Oral daily  nicotine - 21 mG/24Hr(s) Patch 1 Patch Transdermal daily  pantoprazole    Tablet 40 milliGRAM(s) Oral before breakfast    MEDICATIONS  (PRN):      ALLERGIES:  Allergies    No Known Allergies    Intolerances        LABS:                        9.1    5.18  )-----------( 197      ( 19 Mar 2023 07:39 )             29.4     03-19    135  |  100  |  22  ----------------------------<  71  4.6   |  26  |  1.24    Ca    9.3      19 Mar 2023 07:39  Phos  3.6     03-19  Mg     1.9     03-19    TPro  6.3  /  Alb  3.1<L>  /  TBili  0.2  /  DBili  x   /  AST  132<H>  /  ALT  85<H>  /  AlkPhos  258<H>  03-19        CAPILLARY BLOOD GLUCOSE      POCT Blood Glucose.: 96 mg/dL (19 Mar 2023 22:10)      RADIOLOGY & ADDITIONAL TESTS: Reviewed. CC: Patient is a 72y old  Male who presents with a chief complaint of INFECTION ASSOCIATED W/HYPOTHERMIA    INTERVAL EVENTS: Pt had cortisol stimulation test performed on 3/18. Lab values from 3/17 are incorrectly labeled and have been performed after the administration of Cortrosyn 0.25mg @ ~ 10AM 3/18. Pt has been receiving Abilify with no issues overnight.   SUBJECTIVE / INTERVAL HPI: Patient seen and examined at bedside. Pt denies any sx at this time including SOB and cough, palpitations and CP, headaches, fevers, chills, nausea, vomiting, diarrhea, constipation, dysuria, hematuria, hematochezia.   ROS: negative unless otherwise stated above.    VITAL SIGNS:  Vital Signs Last 24 Hrs  T(C): 36.7 (20 Mar 2023 06:14), Max: 37.2 (19 Mar 2023 21:13)  T(F): 98.1 (20 Mar 2023 06:14), Max: 98.9 (19 Mar 2023 21:13)  HR: 51 (20 Mar 2023 06:14) (51 - 57)  BP: 127/76 (20 Mar 2023 06:14) (105/60 - 127/76)  BP(mean): --  RR: 17 (20 Mar 2023 06:14) (16 - 18)  SpO2: 97% (20 Mar 2023 06:14) (96% - 97%)    Parameters below as of 20 Mar 2023 06:14  Patient On (Oxygen Delivery Method): room air          03-18-23 @ 07:01  -  03-19-23 @ 07:00  --------------------------------------------------------  IN: 0 mL / OUT: 1100 mL / NET: -1100 mL    03-19-23 @ 07:01  -  03-20-23 @ 06:28  --------------------------------------------------------  IN: 100 mL / OUT: 3150 mL / NET: -3050 mL        PHYSICAL EXAM:  General: Alert and oriented x 3. No acute distress.  Eyes: EOMI. Anicteric.   HEENT: Moist mucous membranes. No scleral icterus. No cervical lymphadenopathy. Missing teeth on upper and lower mandible   Lungs: No accessory muscle use. CTAB   Cardiovascular: Regular rate and rhythm. No murmur. No JVD.  Abdomen: Soft, non-tender and non-distended. No palpable masses. Ventral abdominal scar noted.   Extremities: Non-tender. Resolved edema in b/l lower extremities. Pulses intact b/l.   Skin: No rashes or lesions. Warm.  Neurologic: No focal neurological deficits. CN II-XII grossly intact, but not individually tested.  Psychiatric: Cooperative. Appropriate mood and affect.    MEDICATIONS:  MEDICATIONS  (STANDING):  apixaban 10 milliGRAM(s) Oral every 12 hours  ARIPiprazole 10 milliGRAM(s) Oral daily  influenza  Vaccine (HIGH DOSE) 0.7 milliLiter(s) IntraMuscular once  multivitamin 1 Tablet(s) Oral daily  nicotine - 21 mG/24Hr(s) Patch 1 Patch Transdermal daily  pantoprazole    Tablet 40 milliGRAM(s) Oral before breakfast    MEDICATIONS  (PRN):      ALLERGIES:  Allergies    No Known Allergies    Intolerances        LABS:                        9.1    5.18  )-----------( 197      ( 19 Mar 2023 07:39 )             29.4     03-19    135  |  100  |  22  ----------------------------<  71  4.6   |  26  |  1.24    Ca    9.3      19 Mar 2023 07:39  Phos  3.6     03-19  Mg     1.9     03-19    TPro  6.3  /  Alb  3.1<L>  /  TBili  0.2  /  DBili  x   /  AST  132<H>  /  ALT  85<H>  /  AlkPhos  258<H>  03-19        CAPILLARY BLOOD GLUCOSE      POCT Blood Glucose.: 96 mg/dL (19 Mar 2023 22:10)      RADIOLOGY & ADDITIONAL TESTS: Reviewed.

## 2023-03-20 NOTE — PROGRESS NOTE ADULT - PROBLEM SELECTOR PLAN 9
No evidence of DVTs on venous duplex studies performed inpatient. Pt noted to be hypoalbuminemic on admission.  Pending TTE. Suspecting low oncotic pressure as source of lower extremity edema, which has improved on examination 3/17  Plan:   - F/u TTE   - Consider RUQ US if TTE unremarkable   - HepC negative Reports hx of HTN, SBP 160s on arrival. Reports being Rx'd anti-HTN meds in the past but does not known which and hasn't been taking. Pt is currently normotensive. On 3/16 on RMF, BP systolic of 139/62 with HR of 64.   Plan:   - Continue to monitor   - TSH normal at 1.25 with T4 of 0.966.

## 2023-03-20 NOTE — BH CONSULTATION LIAISON PROGRESS NOTE - NSICDXBHSECONDARYDX_PSY_ALL_CORE
Suspected deep vein thrombosis (DVT)   [id number]  Suspected pulmonary embolism   [id number]  Other encephalopathy   G93.49  Pulmonary embolism   I26.99  Need for prophylactic measure   Z29.9  Low body temperature   R68.89  Prediabetes   R73.03  History of GI bleed   Z87.19  Leukopenia   D72.819  Anemia   D64.9  Hypertension   I10  Bilateral lower extremity edema   R60.0  KENYATTA (acute kidney injury)   N17.9  
Suspected deep vein thrombosis (DVT)   [id number]  Suspected pulmonary embolism   [id number]  Other encephalopathy   G93.49  Pulmonary embolism   I26.99  Need for prophylactic measure   Z29.9  Low body temperature   R68.89  Prediabetes   R73.03  History of GI bleed   Z87.19  Leukopenia   D72.819  Anemia   D64.9  Hypertension   I10  Bilateral lower extremity edema   R60.0  KENYATTA (acute kidney injury)   N17.9

## 2023-03-20 NOTE — PROGRESS NOTE ADULT - PROBLEM SELECTOR PLAN 7
Initially minimally interactive only nodding head, now returned to baselined MS (A+Ox3) and does remember events leading up to 911 being called. CT head neg for trauma/acute changes. Likely etiology hypothermia. Currently mentating well, AOx3. Some degree/element of confusion assessed on physical examination 3/17 (see Hx of GI bleed). 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 Pt's previous nursing home contacted by SW who mentioned Mr. Mathur went out for an appointment on 2/16 and "ran away". He was previously a resident there for 2 years.   Plan:   - Utox neg  - Per psych, increase Abilify 5>10mg daily   - monitor LFTs  - repeat EKG P/w WBC 2.19 w/o shift. Unknown etiology. Non-toxic appearing, hypothermia, no clear evidence of infection. Urine leg neg, RVP neg. S/p 1 dose of Vanco 1g and Zosyn 3.375 in the ED, not continued. Urine Strep, legionella neg. MRSA negative. UA negative. Afebrile, VSS. Pt improving after > 48hr off abx.   Plan:   - Leukopenia closed on 3/17. Not likely to be d/2 infectious etiology.   - Continue to monitor off abx

## 2023-03-20 NOTE — PROGRESS NOTE ADULT - PROBLEM SELECTOR PLAN 1
Suspect possible DILI 2/2 abilify (<1% incidence). No previous hepatic pathology on previous imaging. Plan for repeat abd u/s but may need to address therapeutic change with behavioral health. Suspect possible DILI 2/2 abilify (<1% incidence). No previous hepatic pathology on previous imaging. Plan for repeat abd u/s but may need to address therapeutic change with behavioral health. RUQ US on 3/19 showing normal liver morphology and smooth surfaces. Increased echogenicity and attenuation of the sound beam posteriorly suggestive of steatosis. No focal lesion. Main portal vein is patent, with hepatopedal flow.  Plan:   - Followup Hepatitis panel in AM 3/21   - Continue to monitor for now

## 2023-03-20 NOTE — BH CONSULTATION LIAISON PROGRESS NOTE - NSBHASSESSMENTFT_PSY_ALL_CORE
73yo man, homeless, history of psychosis, HTN, GI bleed, no known past psychiatric or medical care, BIBEMS for AMS, hypothermia, dizziness, admitted to MICU for rewarming protocol and workup revealed PE. QTc initially 524, improved to 481 on 3/16. Psychiatry consulted for evaluation of psychosis; patient found to exhibit delusional thought content on initial exam but otherwise cognitively and behaviorally appropriate. Abilify started for psychosis, patient adherent and tolerating well. Remains AOx3, no SI or HI. However, patient with overall concerning self-care given requirement for hospitalization.      RECOMMENDATIONS:  --No need for 1:1 at this time  --Will continue to consider need for acute inpatient admission  --Increase Abilify to 10mg daily for psychosis  --Repeat EKG to evaluate QTc; hold antipsychotics if QTc > 500ms 71yo man, homeless, history of psychosis, HTN, GI bleed, no known past psychiatric or medical care, BIBEMS for AMS, hypothermia, dizziness, admitted to MICU for rewarming protocol and workup revealed PE. QTc initially 524, improved to 481 on 3/16. Psychiatry consulted for evaluation of psychosis. On the initial evaluation patient was found to exhibit delusional thought content, otherwise cognitively and behaviorally appropriate. He was stared on abilify. On re-assessment today pt presents calm, cooperative, denies mood sx/AVH/Pi/SI/HI. He is not currently interested in inpatient psychiatric treatment or outpatient follow up. Since admission, patient has been adherent with his medical care.    RECOMMENDATIONS:  --No need for 1:1   --Pt is not interested in voluntary psychiatric treatment and does not meet criteria, at this time, for involuntary treatment  --Continue Abilify to 10mg daily for psychosis  --Follow serial EKGs to evaluate QTc; hold antipsychotics if QTc > 500ms  --CL to follow at needed, please call with questions/concerns.

## 2023-03-20 NOTE — CONSULT NOTE ADULT - SUBJECTIVE AND OBJECTIVE BOX
HISTORY OF PRESENT ILLNESS  REYNA GARCIA is a 72y Male with a past medical history of     CAPILLARY BLOOD GLUCOSE & INSULIN RECEIVED  108 mg/dL (03-19 @ 12:51)  80 mg/dL (03-19 @ 17:21)  96 mg/dL (03-19 @ 22:10)    PAST MEDICAL & SURGICAL HISTORY  As per history of present illness.     FAMILY HISTORY  - Diabetes:  - Thyroid:  - Autoimmune:  - Other:    SOCIAL HISTORY  - Work:  - Alcohol:  - Smoking:  - Recreational Drugs:    ALLERGIES  No Known Allergies    CURRENT MEDICATIONS  apixaban 10 milliGRAM(s) Oral two times a day  ARIPiprazole 10 milliGRAM(s) Oral daily  influenza  Vaccine (HIGH DOSE) 0.7 milliLiter(s) IntraMuscular once  multivitamin 1 Tablet(s) Oral daily  nicotine - 21 mG/24Hr(s) Patch 1 Patch Transdermal daily  pantoprazole    Tablet 40 milliGRAM(s) Oral before breakfast    REVIEW OF SYSTEMS  Constitutional:  Negative fever, chills or loss of appetite.  Eyes:  Negative blurry vision or double vision.  Cardiovascular:  Negative for chest pain or palpitations.  Respiratory:  Negative for cough, wheezing, or shortness of breath.   Gastrointestinal:  Negative for nausea, vomiting, diarrhea, constipation, or abdominal pain.  Genitourinary:  Negative frequency, urgency or dysuria.  Neurologic:  No headache, confusion, dizziness, lightheadedness.    PHYSICAL EXAM  Vital Signs Last 24 Hrs  T(C): 36.7 (20 Mar 2023 06:14), Max: 37.2 (19 Mar 2023 21:13)  T(F): 98.1 (20 Mar 2023 06:14), Max: 98.9 (19 Mar 2023 21:13)  HR: 51 (20 Mar 2023 06:14) (51 - 57)  BP: 127/76 (20 Mar 2023 06:14) (105/60 - 127/76)  BP(mean): --  RR: 17 (20 Mar 2023 06:14) (16 - 18)  SpO2: 97% (20 Mar 2023 06:14) (96% - 97%)    Parameters below as of 20 Mar 2023 06:14  Patient On (Oxygen Delivery Method): room air    Constitutional: Awake, alert, in no acute distress.   HEENT: Normocephalic, atraumatic, ALE, no proptosis or lid retraction.   Neck: supple, no acanthosis, no thyromegaly or palpable thyroid nodules.  Respiratory: Lungs clear to ausculation bilaterally.   Cardiovascular: regular rhythm, normal S1 and S2, no audible murmurs.   GI: soft, non-tender, non-distended, bowel sounds present, no masses appreciated.  Extremities: No lower extremity edema, peripheral pulses present.   Skin: no rashes.   Psychiatric: AAO x 3. Normal affect/mood.     LABS  CBC - WBC/HGB/HTC/PLT: 5.18/9.1/29.4/197 (03-19-23)  BMP: Na/K/Cl/Bicarb/BUN/Cr/Gluc: 135/4.6/100/26/22/1.24/71 (03-19-23)  Anion Gap: 9 (03-19-23)  eGFR: 62 (03-19-23)  Calcium: 9.3 (03-19-23)  Phosphorus: 3.6 (03-19-23)  Magnesium: 1.9 (03-19-23)  LFT - Alb/Tprot/Tbili/Dbili/AlkPhos/ALT/AST: 3.1/--/0.2/--/258/85/132 (03-19-23)  PT/aPTT/INR: 12.0/--/1.01 (03-16-23)  Thyroid Stimulating Hormone, Serum: 1.250 (03-15-23)  Total T4/Free T4: --/0.966 (03-16-23)    ASSESSMENT / RECOMMENDATIONS    A1C: 6.0 %  BUN: 22  Creatinine: 1.24  GFR: 62  Weight: 72.6  BMI:   EF:     # Concern for adrenal insufficiency  -     Case discussed with Dr. Pinon. Primary team updated.       Bunny De La Fuente    Endocrinology Fellow    Service Pager: [phone]  HISTORY OF PRESENT ILLNESS  Theo Mathur is a 72-year-old male with a past medical history of hypertension who was brought to the emergency department (3/15/23) after he was found to outside with altered mental status, complaining of dizziness and inability to walk. Upon arrival, his vital signs were remarkable for hypothermia (81.5 F, rectal), bradycardia (58 bpm) and hypertension (167/75 mmHg). The patient minimally interactive, only nodding his head. He had been recently admitted to [facility] for an unspecified reason and had not followed with any doctors since. Labs were remarkable for leukopenia (WBC 2.19), microcytic anemia (Hgb 10.9, MCV 68) and mild transaminitis. He was started on acute internal rewarming protocol including warmed IVF, warmed bladder irrigation and bare hugger. The patient was initially admitted to MICU for further management. He was found to have a right lower lobe subsegmental pulmonary embolism and was started on full dose anticoagulation. After rewarming, his mental status improved and he was transferred to [facility].     CAPILLARY BLOOD GLUCOSE & INSULIN RECEIVED  108 mg/dL (03-19 @ 12:51)  80 mg/dL (03-19 @ 17:21)  96 mg/dL (03-19 @ 22:10)    PAST MEDICAL & SURGICAL HISTORY  As per history of present illness.     FAMILY HISTORY  - Diabetes:  - Thyroid:  - Autoimmune:  - Other:    SOCIAL HISTORY  - Work:  - Alcohol:  - Smoking:  - Recreational Drugs:    ALLERGIES  No Known Allergies    CURRENT MEDICATIONS  apixaban 10 milliGRAM(s) Oral two times a day  ARIPiprazole 10 milliGRAM(s) Oral daily  influenza  Vaccine (HIGH DOSE) 0.7 milliLiter(s) IntraMuscular once  multivitamin 1 Tablet(s) Oral daily  nicotine - 21 mG/24Hr(s) Patch 1 Patch Transdermal daily  pantoprazole    Tablet 40 milliGRAM(s) Oral before breakfast    REVIEW OF SYSTEMS  Constitutional:  Negative fever, chills or loss of appetite.  Eyes:  Negative blurry vision or double vision.  Cardiovascular:  Negative for chest pain or palpitations.  Respiratory:  Negative for cough, wheezing, or shortness of breath.   Gastrointestinal:  Negative for nausea, vomiting, diarrhea, constipation, or abdominal pain.  Genitourinary:  Negative frequency, urgency or dysuria.  Neurologic:  No headache, confusion, dizziness, lightheadedness.    PHYSICAL EXAM  Vital Signs Last 24 Hrs  T(C): 36.7 (20 Mar 2023 06:14), Max: 37.2 (19 Mar 2023 21:13)  T(F): 98.1 (20 Mar 2023 06:14), Max: 98.9 (19 Mar 2023 21:13)  HR: 51 (20 Mar 2023 06:14) (51 - 57)  BP: 127/76 (20 Mar 2023 06:14) (105/60 - 127/76)  BP(mean): --  RR: 17 (20 Mar 2023 06:14) (16 - 18)  SpO2: 97% (20 Mar 2023 06:14) (96% - 97%)    Parameters below as of 20 Mar 2023 06:14  Patient On (Oxygen Delivery Method): room air    Constitutional: Awake, alert, in no acute distress.   HEENT: Normocephalic, atraumatic, ALE, no proptosis or lid retraction.   Neck: supple, no acanthosis, no thyromegaly or palpable thyroid nodules.  Respiratory: Lungs clear to ausculation bilaterally.   Cardiovascular: regular rhythm, normal S1 and S2, no audible murmurs.   GI: soft, non-tender, non-distended, bowel sounds present, no masses appreciated.  Extremities: No lower extremity edema, peripheral pulses present.   Skin: no rashes.   Psychiatric: AAO x 3. Normal affect/mood.     LABS  CBC - WBC/HGB/HTC/PLT: 5.18/9.1/29.4/197 (03-19-23)  BMP: Na/K/Cl/Bicarb/BUN/Cr/Gluc: 135/4.6/100/26/22/1.24/71 (03-19-23)  Anion Gap: 9 (03-19-23)  eGFR: 62 (03-19-23)  Calcium: 9.3 (03-19-23)  Phosphorus: 3.6 (03-19-23)  Magnesium: 1.9 (03-19-23)  LFT - Alb/Tprot/Tbili/Dbili/AlkPhos/ALT/AST: 3.1/--/0.2/--/258/85/132 (03-19-23)  PT/aPTT/INR: 12.0/--/1.01 (03-16-23)  Thyroid Stimulating Hormone, Serum: 1.250 (03-15-23)  Total T4/Free T4: --/0.966 (03-16-23)    ASSESSMENT / RECOMMENDATIONS    A1C: 6.0 %  BUN: 22  Creatinine: 1.24  GFR: 62  Weight: 72.6  BMI:   EF:     # Concern for adrenal insufficiency  -     Case discussed with Dr. Pinon. Primary team updated.       Bunny De La Fuente    Endocrinology Fellow    Service Pager: [phone]  HISTORY OF PRESENT ILLNESS  Theo Mathur is a 72-year-old male with a past medical history of hypertension, ?schizophrenia who was brought to the emergency department (3/15/23) after he was found to outside with altered mental status, complaining of dizziness and inability to walk. Upon arrival, his vital signs were remarkable for hypothermia (81.5 F, rectal), bradycardia (58 bpm) and hypertension (167/75 mmHg). The patient minimally interactive, only nodding his head. He had been recently admitted to [facility] for an unspecified reason and had not followed with any doctors since. Labs were remarkable for leukopenia (WBC 2.19), microcytic anemia (Hgb 10.9, MCV 68) and mild transaminitis. He was started on acute internal rewarming protocol including warmed IVF, warmed bladder irrigation and bare hugger. The patient was initially admitted to MICU for further management. He received one dose of vancomycin and zosyn in the emergency room; however, the antibiotics were not continued as as there was no clear source of infection and he appeared non-toxic. He was found to have a right lower lobe subsegmental pulmonary embolism and was started on full dose anticoagulation. After rewarming, his mental status improved and he was transferred to [facility]. Given initial presentation with hypothermia without clear source of infection, primary team decided to rule-out adrenal insufficiency. Cortisol level was 5.44 (3/17/23 at 8:18AM). A cosyntropin stimulation test was done showing a baseline ACTH of 62.6, baseline cortisol of 4.7 (3/18 at 5:30 AM) followed by cosyntropin administration (3/18 at 10 AM), 30 minutes cortisol level at 9.7 and 60 minutes cortisol level at 9.7. Per primary team, timing recorded in [place] is incorrect, and reflects the time at which the labels where printed one day prior to stimulation test. Endocrinology was consulted for recommendations given concern for adrenal insufficiency.     CAPILLARY BLOOD GLUCOSE & INSULIN RECEIVED  108 mg/dL (03-19 @ 12:51)  80 mg/dL (03-19 @ 17:21)  96 mg/dL (03-19 @ 22:10)    PAST MEDICAL & SURGICAL HISTORY  As per history of present illness.     FAMILY HISTORY  - Diabetes:  - Thyroid:  - Autoimmune:  - Other:    SOCIAL HISTORY  - Work:  - Alcohol:  - Smoking:  - Recreational Drugs:    ALLERGIES  No Known Allergies    CURRENT MEDICATIONS  apixaban 10 milliGRAM(s) Oral two times a day  ARIPiprazole 10 milliGRAM(s) Oral daily  influenza  Vaccine (HIGH DOSE) 0.7 milliLiter(s) IntraMuscular once  multivitamin 1 Tablet(s) Oral daily  nicotine - 21 mG/24Hr(s) Patch 1 Patch Transdermal daily  pantoprazole    Tablet 40 milliGRAM(s) Oral before breakfast    REVIEW OF SYSTEMS  Constitutional:  Negative fever, chills or loss of appetite.  Eyes:  Negative blurry vision or double vision.  Cardiovascular:  Negative for chest pain or palpitations.  Respiratory:  Negative for cough, wheezing, or shortness of breath.   Gastrointestinal:  Negative for nausea, vomiting, diarrhea, constipation, or abdominal pain.  Genitourinary:  Negative frequency, urgency or dysuria.  Neurologic:  No headache, confusion, dizziness, lightheadedness.    PHYSICAL EXAM  Vital Signs Last 24 Hrs  T(C): 36.7 (20 Mar 2023 06:14), Max: 37.2 (19 Mar 2023 21:13)  T(F): 98.1 (20 Mar 2023 06:14), Max: 98.9 (19 Mar 2023 21:13)  HR: 51 (20 Mar 2023 06:14) (51 - 57)  BP: 127/76 (20 Mar 2023 06:14) (105/60 - 127/76)  BP(mean): --  RR: 17 (20 Mar 2023 06:14) (16 - 18)  SpO2: 97% (20 Mar 2023 06:14) (96% - 97%)    Parameters below as of 20 Mar 2023 06:14  Patient On (Oxygen Delivery Method): room air    Constitutional: Awake, alert, in no acute distress.   HEENT: Normocephalic, atraumatic, ALE, no proptosis or lid retraction.   Neck: supple, no acanthosis, no thyromegaly or palpable thyroid nodules.  Respiratory: Lungs clear to ausculation bilaterally.   Cardiovascular: regular rhythm, normal S1 and S2, no audible murmurs.   GI: soft, non-tender, non-distended, bowel sounds present, no masses appreciated.  Extremities: No lower extremity edema, peripheral pulses present.   Skin: no rashes.   Psychiatric: AAO x 3. Normal affect/mood.     LABS  CBC - WBC/HGB/HTC/PLT: 5.18/9.1/29.4/197 (03-19-23)  BMP: Na/K/Cl/Bicarb/BUN/Cr/Gluc: 135/4.6/100/26/22/1.24/71 (03-19-23)  Anion Gap: 9 (03-19-23)  eGFR: 62 (03-19-23)  Calcium: 9.3 (03-19-23)  Phosphorus: 3.6 (03-19-23)  Magnesium: 1.9 (03-19-23)  LFT - Alb/Tprot/Tbili/Dbili/AlkPhos/ALT/AST: 3.1/--/0.2/--/258/85/132 (03-19-23)  PT/aPTT/INR: 12.0/--/1.01 (03-16-23)  Thyroid Stimulating Hormone, Serum: 1.250 (03-15-23)  Total T4/Free T4: --/0.966 (03-16-23)    ASSESSMENT / RECOMMENDATIONS    A1C: 6.0 %  BUN: 22  Creatinine: 1.24  GFR: 62  Weight: 72.6  BMI:   EF:     # Concern for adrenal insufficiency  -     Case discussed with Dr. Pinon. Primary team updated.       Bunny De La Fuente    Endocrinology Fellow    Service Pager: [phone]  HISTORY OF PRESENT ILLNESS  Theo Mathur is a 72-year-old male with a past medical history of hypertension, ?schizophrenia who was brought to the emergency department (3/15/23) after he was found to outside with altered mental status, complaining of dizziness and inability to walk. Upon arrival, his vital signs were remarkable for hypothermia (81.5 F, rectal), bradycardia (58 bpm) and hypertension (167/75 mmHg). The patient minimally interactive, only nodding his head. He had been recently admitted to [facility] ED for an unspecified reason and had not followed with any doctors since. Labs were remarkable for leukopenia (WBC 2.19), microcytic anemia (Hgb 10.9, MCV 68) and mild transaminitis. He was started on acute internal rewarming protocol including warmed IVF, warmed bladder irrigation and bare hugger. The patient was initially admitted to MICU for further management. He received one dose of vancomycin and zosyn in the emergency room; however, the antibiotics were not continued as as there was no clear source of infection and he appeared non-toxic. He was found to have a right lower lobe subsegmental pulmonary embolism and was started on full dose anticoagulation. After rewarming, his mental status improved and he was transferred to [facility]. Given initial presentation with hypothermia without clear source of infection, primary team decided to rule-out adrenal insufficiency. Cortisol level was 5.44 (3/17/23 at 8:18AM). A cosyntropin stimulation test was done showing a baseline ACTH of 62.6, baseline cortisol of 4.7 (3/18 at 5:30 AM) followed by cosyntropin administration (3/18 at 10 AM), 30 minutes cortisol level at 9.7 and 60 minutes cortisol level at 9.7. Per primary team, timing recorded in [place] is incorrect, and reflects the time at which the labels where printed one day prior to stimulation test. Endocrinology was consulted for recommendations given concern for adrenal insufficiency.     Upon evaluation, the patient reports having prior stomach surgery for gastrointestinal bleeding in the past (presumably for ?diverticular bleeding, based on his description). He denied experiencing nausea, vomiting, decreased appetite, abdominal pain, fatigue, fever, weight loss or hyperpigmentation. He remains hemodynamically stable. CT abdomen and pelvis with contrast (3/15/23) revealed that adrenal glands are within normal limits.     CAPILLARY BLOOD GLUCOSE & INSULIN RECEIVED  108 mg/dL (03-19 @ 12:51)  80 mg/dL (03-19 @ 17:21)  96 mg/dL (03-19 @ 22:10)    PAST MEDICAL & SURGICAL HISTORY  As per history of present illness.     ALLERGIES  No Known Allergies    CURRENT MEDICATIONS  apixaban 10 milliGRAM(s) Oral two times a day  ARIPiprazole 10 milliGRAM(s) Oral daily  influenza  Vaccine (HIGH DOSE) 0.7 milliLiter(s) IntraMuscular once  multivitamin 1 Tablet(s) Oral daily  nicotine - 21 mG/24Hr(s) Patch 1 Patch Transdermal daily  pantoprazole    Tablet 40 milliGRAM(s) Oral before breakfast    REVIEW OF SYSTEMS  Constitutional:  Negative fever, chills or loss of appetite.  Eyes:  (+) right eye lid ptosis. Negative blurry vision or double vision.  Cardiovascular:  Negative for chest pain or palpitations.  Respiratory:  Negative for cough, wheezing, or shortness of breath.   Gastrointestinal:  Negative for nausea, vomiting, diarrhea, constipation, or abdominal pain.  Neurologic:  No headache, confusion, dizziness, lightheadedness.    PHYSICAL EXAM  Vital Signs Last 24 Hrs  T(C): 36.7 (20 Mar 2023 06:14), Max: 37.2 (19 Mar 2023 21:13)  T(F): 98.1 (20 Mar 2023 06:14), Max: 98.9 (19 Mar 2023 21:13)  HR: 51 (20 Mar 2023 06:14) (51 - 57)  BP: 127/76 (20 Mar 2023 06:14) (105/60 - 127/76)  BP(mean): --  RR: 17 (20 Mar 2023 06:14) (16 - 18)  SpO2: 97% (20 Mar 2023 06:14) (96% - 97%)    Parameters below as of 20 Mar 2023 06:14  Patient On (Oxygen Delivery Method): room air    Constitutional: Awake, alert, male, in no acute distress.   HEENT: Normocephalic, atraumatic, ALE (+) Right eye ptosis.   Respiratory: Lungs clear to ausculation bilaterally.   Cardiovascular: regular rhythm, normal S1 and S2, no audible murmurs.   GI: soft, non-tender, non-distended, bowel sounds present (+) well healed midline surgical scar.   Extremities: No lower extremity edema.  Psychiatric: AAO x 3. Normal affect/mood.     LABS  CBC - WBC/HGB/HTC/PLT: 5.18/9.1/29.4/197 (03-19-23)  BMP: Na/K/Cl/Bicarb/BUN/Cr/Gluc: 135/4.6/100/26/22/1.24/71 (03-19-23)  Anion Gap: 9 (03-19-23)  eGFR: 62 (03-19-23)  Calcium: 9.3 (03-19-23)  Phosphorus: 3.6 (03-19-23)  Magnesium: 1.9 (03-19-23)  LFT - Alb/Tprot/Tbili/Dbili/AlkPhos/ALT/AST: 3.1/--/0.2/--/258/85/132 (03-19-23)  PT/aPTT/INR: 12.0/--/1.01 (03-16-23)  Thyroid Stimulating Hormone, Serum: 1.250 (03-15-23)  Total T4/Free T4: --/0.966 (03-16-23)    ASSESSMENT / RECOMMENDATIONS  Mr. Mathur is a 72-year-old male with a past medical history of hypertension, ?schizophrenia who was brought to the emergency department (3/15/23) after he was found outside with altered mental status, complaining of dizziness and inability to walk. He had severe hypothermia which improved after active rewarming. His mental status also improved and he was transferred to [facility]. Work-up for adrenal insufficiency was initiated given brief episode of hypotension requiring levophed while hypothermic without a clear source of infection. Morning cortisol appears to be inappropriately normal at 5.44 given degree of stress. In addition, there is an inadequate response to cosyntropin with levels increasing to 9.7. Suspect that if adrenal insufficiency is present, would be partial given he remains hemodynamically stable, without clear signs/symptoms of adrenal insufficiency and structurally normal adrenal glands on imaging.     A1C: 6.0 %  BUN: 22  Creatinine: 1.24  GFR: 62  Weight: 72.6    # Concern for adrenal insufficiency  - Please repeat morning cortisol, adrenocorticotropic hormone, aldosterone level, adrenal antibodies and Quantiferon.   - If patient becomes hemodynamically stable, please administer hydrocortisone 50 mg IV every 8 hours.     Case discussed with Dr. Pinon. Primary team updated.       Bunny De La Fuente    Endocrinology Fellow    Service Pager: [phone]

## 2023-03-20 NOTE — PROGRESS NOTE ADULT - PROBLEM SELECTOR PLAN 8
Pt reports hx of "gastric surgery for GI bleed" in 2017 which he believes was @[facility] but no documentation available. Reports previously taking Omeprazole but has not taken any Rx's in some time. Hb 10.9 on admission.  No evidence of GIB at this time, pt denies recent melena/hematochezia/hematemesis. Per note at [facility] in 2016, pt seen by Dr Robin Bland however no documentation found. On AM of 3/17, pt saying his GI surgery was at [facility] 2 years ago -- again no documentation found on [facility]. No objective findings of complications from GI surgery.   Plan:   - c/w Pantoprazole 40mg PO QD No evidence of DVTs on venous duplex studies performed inpatient. Pt noted to be hypoalbuminemic on admission.  Pending TTE. Suspecting low oncotic pressure as source of lower extremity edema, which has improved on examination 3/17  Plan:   - F/u TTE   - Consider RUQ US if TTE unremarkable   - HepC negative

## 2023-03-20 NOTE — PROGRESS NOTE ADULT - PROBLEM SELECTOR PLAN 3
P/w WBC 2.19 w/o shift. Unknown etiology. Non-toxic appearing, hypothermia, no clear evidence of infection. Urine leg neg, RVP neg. S/p 1 dose of Vanco 1g and Zosyn 3.375 in the ED, not continued. Urine Strep, legionella neg. MRSA negative. UA negative. Afebrile, VSS. Pt improving after > 48hr off abx.   Plan:   - Leukopenia closed on 3/17. Not likely to be d/2 infectious etiology.   - Continue to monitor off abx Initially minimally interactive only nodding head, now returned to baselined MS (A+Ox3) and does remember events leading up to 911 being called. CT head neg for trauma/acute changes. Likely etiology hypothermia. Currently mentating well, AOx3. Some degree/element of confusion assessed on physical examination 3/17 (see Hx of GI bleed). 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 Pt's previous nursing home contacted by SW who mentioned Mr. Mathur went out for an appointment on 2/16 and "ran away". He was previously a resident there for 2 years. Pt now s/p Psychiatry consult for noted psychosis. Pt noted to be hypocortisolemic on 3/17 to 5.4. ACTH stimulation test on 3/18 showing baseline ACTH of 62.6, with stim baseline of 4.7. 30 min after admin of Cortrosyn 0.25mg @ 10AM 3/18 showed rise to just 9.7. 1 hour later, stable at 9.7. 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    Plan:   - Utox neg  - Per psych, increase Abilify 5>10mg daily   - Endocrine consulted, appreciate recs   - monitor LFTs  - repeat EKG Initially minimally interactive only nodding head, now returned to baselined MS (A+Ox3) and does remember events leading up to 911 being called. CT head neg for trauma/acute changes. Likely etiology hypothermia. Currently mentating well, AOx3. Some degree/element of confusion assessed on physical examination 3/17 (see Hx of GI bleed). 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 Pt's previous nursing home contacted by SW who mentioned Mr. Mathur went out for an appointment on 2/16 and "ran away". He was previously a resident there for 2 years. Pt now s/p Psychiatry consult for noted psychosis. Pt noted to be hypocortisolemic on 3/17 to 5.4. ACTH stimulation test on 3/18 showing baseline ACTH of 62.6, with stim baseline of 4.7. 30 min after admin of Cortrosyn 0.25mg @ 10AM 3/18 showed rise to just 9.7. 1 hour later, stable at 9.7. 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    Plan:   - Utox neg  - Per psych, increase Abilify 5>10mg daily   - Endocrine consulted, appreciate recs   - monitor LFTs  - repeat EKG in AM

## 2023-03-20 NOTE — BH CONSULTATION LIAISON PROGRESS NOTE - NSBHATTESTBILLING_PSY_A_CORE
73547-Pieqscbble OBS or IP - moderate complexity OR 35-49 mins [id number]-Jyvwgdslxt OBS or IP - high complexity OR 50-79 mins

## 2023-03-20 NOTE — PROGRESS NOTE ADULT - ASSESSMENT
INTERVAL HPI/OVERNIGHT EVENTS: LIZA o/n. No new complaints this AM.     VITAL SIGNS:  T(F): 98.6 (03-19-23 @ 12:15)  HR: 57 (03-19-23 @ 12:15)  BP: 117/61 (03-19-23 @ 12:15)  RR: 18 (03-19-23 @ 12:15)  SpO2: 96% (03-19-23 @ 12:15)  Wt(kg): --    PHYSICAL EXAM:      Constitutional: NAD, well-groomed, well-developed  HEENT: PERRLA, EOMI, Normal Hearing, MMM  Neck: No LAD, No JVD  Back: Normal spine flexure, No CVA tenderness  Respiratory: CTABCardiovascular: S1 and S2, RRR, no M/G/R  Gastrointestinal: BS+, soft, NT/ND  Extremities: No peripheral edema  Vascular: 2+ peripheral pulses  Neurological: A/O x 3, no focal deficits  Psychiatric: Normal mood, normal affect  Musculoskeletal: 5/5 strength b/l upper and lower extremities  Skin: No rashes      MEDICATIONS  (STANDING):  apixaban 10 milliGRAM(s) Oral every 12 hours  ARIPiprazole 10 milliGRAM(s) Oral daily  influenza  Vaccine (HIGH DOSE) 0.7 milliLiter(s) IntraMuscular once  nicotine - 21 mG/24Hr(s) Patch 1 Patch Transdermal daily  pantoprazole    Tablet 40 milliGRAM(s) Oral before breakfast    MEDICATIONS  (PRN):      Allergies    No Known Allergies    Intolerances        LABS:                        9.1    5.18  )-----------( 197      ( 19 Mar 2023 07:39 )             29.4     03-19    135  |  100  |  22  ----------------------------<  71  4.6   |  26  |  1.24    Ca    9.3      19 Mar 2023 07:39  Phos  3.6     03-19  Mg     1.9     03-19    TPro  6.3  /  Alb  3.1<L>  /  TBili  0.2  /  DBili  x   /  AST  132<H>  /  ALT  85<H>  /  AlkPhos  258<H>  03-19          RADIOLOGY & ADDITIONAL TESTS:     71 y/o undomiciled Male current smoker (1ppd x50+yrs), w/ PSHx of unspecified GI surgery reportedly for GIB, and PMHx of HTN who was initially BIBEMS for hypotension and hypothermia, admitted to MICU and stabilized, and evaluated on RMF for hypoadrenalism and psychosis.

## 2023-03-21 LAB
GLUCOSE BLDC GLUCOMTR-MCNC: 112 MG/DL — HIGH (ref 70–99)
GLUCOSE BLDC GLUCOMTR-MCNC: 75 MG/DL — SIGNIFICANT CHANGE UP (ref 70–99)
GLUCOSE BLDC GLUCOMTR-MCNC: 91 MG/DL — SIGNIFICANT CHANGE UP (ref 70–99)
GLUCOSE BLDC GLUCOMTR-MCNC: 93 MG/DL — SIGNIFICANT CHANGE UP (ref 70–99)

## 2023-03-21 PROCEDURE — 99232 SBSQ HOSP IP/OBS MODERATE 35: CPT | Mod: GC

## 2023-03-21 RX ADMIN — PANTOPRAZOLE SODIUM 40 MILLIGRAM(S): 20 TABLET, DELAYED RELEASE ORAL at 06:19

## 2023-03-21 RX ADMIN — Medication 1 PATCH: at 16:42

## 2023-03-21 RX ADMIN — Medication 1 PATCH: at 10:52

## 2023-03-21 RX ADMIN — Medication 1 PATCH: at 10:43

## 2023-03-21 RX ADMIN — APIXABAN 10 MILLIGRAM(S): 2.5 TABLET, FILM COATED ORAL at 17:01

## 2023-03-21 RX ADMIN — APIXABAN 10 MILLIGRAM(S): 2.5 TABLET, FILM COATED ORAL at 06:19

## 2023-03-21 RX ADMIN — Medication 1 TABLET(S): at 10:52

## 2023-03-21 RX ADMIN — Medication 1 PATCH: at 06:34

## 2023-03-21 RX ADMIN — ARIPIPRAZOLE 10 MILLIGRAM(S): 15 TABLET ORAL at 10:52

## 2023-03-21 NOTE — PROGRESS NOTE ADULT - PROBLEM SELECTOR PLAN 1
Suspect possible DILI 2/2 abilify (<1% incidence). No previous hepatic pathology on previous imaging. Plan for repeat abd u/s but may need to address therapeutic change with behavioral health. RUQ US on 3/19 showing normal liver morphology and smooth surfaces. Increased echogenicity and attenuation of the sound beam posteriorly suggestive of steatosis. No focal lesion. Main portal vein is patent, with hepatopedal flow.  Plan:   - Followup Hepatitis panel in AM 3/21   - Continue to monitor for now Suspect possible DILI 2/2 abilify (<1% incidence). No previous hepatic pathology on previous imaging. Plan for repeat abd u/s but may need to address therapeutic change with behavioral health. RUQ US on 3/19 showing normal liver morphology and smooth surfaces. Increased echogenicity and attenuation of the sound beam posteriorly suggestive of steatosis. No focal lesion. Main portal vein is patent, with hepatopedal flow.  Plan:   - Followup Hepatitis panel in AM 3/22, as pt refused labs today   - Continue to monitor for now

## 2023-03-21 NOTE — PROGRESS NOTE ADULT - PROBLEM SELECTOR PLAN 6
Pt reports hx of "gastric surgery for GI bleed" in 2017 which he believes was @[facility] but no documentation available. Reports previously taking Omeprazole but has not taken any Rx's in some time. Hb 10.9 on admission.  No evidence of GIB at this time, pt denies recent melena/hematochezia/hematemesis. Per note at [facility] in 2016, pt seen by Dr Robin Bland however no documentation found. On AM of 3/17, pt saying his GI surgery was at [facility] 2 years ago -- again no documentation found on [facility]. No objective findings of complications from GI surgery.   Plan:   - c/w Pantoprazole 40mg PO QD

## 2023-03-21 NOTE — CHART NOTE - NSCHARTNOTEFT_GEN_A_CORE
Admitting Diagnosis:   Patient is a 72y old  Male who presents with a chief complaint of Hypothermia, AMS (21 Mar 2023 06:18)      PAST MEDICAL & SURGICAL HISTORY:  Hypertension      GIB (gastrointestinal bleeding)      Current smoker      History of gastric surgery          Current Nutrition Order: DASH/TLC + Ensure enlive once/day    PO Intake: Good (%) [ X  ]  Fair (50-75%) [   ] Poor (<25%) [   ]    GI Issues: No N/V/D/C    Pain: No pain noted    Skin Integrity: Kvng score 15; 2+ edema R/L leg    Labs:   03-20    132<L>  |  99  |  26<H>  ----------------------------<  112<H>  4.5   |  23  |  1.14    Ca    9.4      20 Mar 2023 12:00  Phos  3.8     03-20  Mg     2.0     03-20    TPro  7.4  /  Alb  3.0<L>  /  TBili  0.2  /  DBili  <0.2  /  AST  64<H>  /  ALT  62<H>  /  AlkPhos  255<H>  03-20    CAPILLARY BLOOD GLUCOSE      POCT Blood Glucose.: 91 mg/dL (21 Mar 2023 13:26)  POCT Blood Glucose.: 75 mg/dL (21 Mar 2023 06:49)  POCT Blood Glucose.: 93 mg/dL (21 Mar 2023 00:57)  POCT Blood Glucose.: 110 mg/dL (20 Mar 2023 18:02)      Medications:  MEDICATIONS  (STANDING):  apixaban 10 milliGRAM(s) Oral two times a day  ARIPiprazole 10 milliGRAM(s) Oral daily  influenza  Vaccine (HIGH DOSE) 0.7 milliLiter(s) IntraMuscular once  multivitamin 1 Tablet(s) Oral daily  nicotine - 21 mG/24Hr(s) Patch 1 Patch Transdermal daily  pantoprazole    Tablet 40 milliGRAM(s) Oral before breakfast    MEDICATIONS  (PRN):    Admission Anthropometrics:  Height for BMI (FEET)	5 Feet  Height for BMI (INCHES)	2 Inch(s)  Height for BMI (CENTIMETERS)	157.48 Centimeter(s)  Weight for BMI (lbs)	160.1 lb  Weight for BMI (kg)	72.6 kg  Body Mass Index	29.2    Estimated energy needs:     Estimated Energy Needs From (tasha/kg)	22  Estimated Energy Needs To (tasha/kg)	25  Estimated Energy Needs Calculated From (tasha/kg)	1177  Estimated Energy Needs Calculated To (tasha/kg)	1337    Estimated Protein Needs From (g/kg)	1  Estimated Protein Needs To (g/kg)	1.2  Estimated Protein Needs Calculated From (g/kg)	53.5  Estimated Protein Needs Calculated To (g/kg)	64.2    Estimated Fluid Needs From (ml/kg)	30  Estimated Fluid Needs To (ml/kg)	35  Estimated Fluid Needs Calculated From (ml/kg)	1605  Estimated Fluid Needs Calculated To (ml/kg)	1872    *Ideal body weight used for calculations as pt >120% of IBW (136% IBW). Needs estimated for age      Subjective:   73 y/o undomiciled Male current smoker (1ppd x50+yrs), w/ PSHx of unspecified GI surgery reportedly for GIB, and PMHx of HTN who was initially BIBEMS for hypotension and hypothermia, admitted to MICU and stabilized, and evaluated on RMF for hypoadrenalism and psychosis.         Previous Nutrition Diagnosis:  No active nutrition diagnosis, pt meeting estimated nutrition needs    New PES:  Increased nutrient needs (kcal, protein, micronutrients) R/T undomeciled status, clinical course as evidenced by increased metabolic demand for nutrients s/p critical state, suspected repletion needs    Goal: Consistently meet >75% EER    Recommendations:  1. DASH/TLC diet + ensure enlive/once/day  --Follow intake closely, adjust prn  2. Continue MVI  3. Monitor electrolytes, adjust and replete prn  4. Pain and bowel regimen per team   5. RD diet edu prn      Education: Reviewed adequate intake parameters    Risk Level: High [   ] Moderate [ X  ] Low [   ] Admitting Diagnosis:   Patient is a 72y old  Male who presents with a chief complaint of Hypothermia, AMS (21 Mar 2023 06:18)      PAST MEDICAL & SURGICAL HISTORY:  Hypertension      GIB (gastrointestinal bleeding)      Current smoker      History of gastric surgery          Current Nutrition Order: DASH/TLC + Ensure enlive once/day    PO Intake: Good (%) [ X  ]  Fair (50-75%) [   ] Poor (<25%) [   ]    GI Issues: No N/V/D/C    Pain: No pain noted    Skin Integrity: Kvng score 15; 2+ edema R/L leg    Labs:   03-20    132<L>  |  99  |  26<H>  ----------------------------<  112<H>  4.5   |  23  |  1.14    Ca    9.4      20 Mar 2023 12:00  Phos  3.8     03-20  Mg     2.0     03-20    TPro  7.4  /  Alb  3.0<L>  /  TBili  0.2  /  DBili  <0.2  /  AST  64<H>  /  ALT  62<H>  /  AlkPhos  255<H>  03-20    CAPILLARY BLOOD GLUCOSE      POCT Blood Glucose.: 91 mg/dL (21 Mar 2023 13:26)  POCT Blood Glucose.: 75 mg/dL (21 Mar 2023 06:49)  POCT Blood Glucose.: 93 mg/dL (21 Mar 2023 00:57)  POCT Blood Glucose.: 110 mg/dL (20 Mar 2023 18:02)      Medications:  MEDICATIONS  (STANDING):  apixaban 10 milliGRAM(s) Oral two times a day  ARIPiprazole 10 milliGRAM(s) Oral daily  influenza  Vaccine (HIGH DOSE) 0.7 milliLiter(s) IntraMuscular once  multivitamin 1 Tablet(s) Oral daily  nicotine - 21 mG/24Hr(s) Patch 1 Patch Transdermal daily  pantoprazole    Tablet 40 milliGRAM(s) Oral before breakfast    MEDICATIONS  (PRN):    Admission Anthropometrics:  Height for BMI (FEET)	5 Feet  Height for BMI (INCHES)	2 Inch(s)  Height for BMI (CENTIMETERS)	157.48 Centimeter(s)  Weight for BMI (lbs)	160.1 lb  Weight for BMI (kg)	72.6 kg  Body Mass Index	29.2    Estimated energy needs:     Estimated Energy Needs From (tasha/kg)	22  Estimated Energy Needs To (tasha/kg)	25  Estimated Energy Needs Calculated From (tasha/kg)	1177  Estimated Energy Needs Calculated To (tasha/kg)	1337    Estimated Protein Needs From (g/kg)	1  Estimated Protein Needs To (g/kg)	1.2  Estimated Protein Needs Calculated From (g/kg)	53.5  Estimated Protein Needs Calculated To (g/kg)	64.2    Estimated Fluid Needs From (ml/kg)	30  Estimated Fluid Needs To (ml/kg)	35  Estimated Fluid Needs Calculated From (ml/kg)	1605  Estimated Fluid Needs Calculated To (ml/kg)	1872    *Ideal body weight used for calculations as pt >120% of IBW (136% IBW). Needs estimated for age      Subjective:   71 y/o undomiciled Male current smoker (1ppd x50+yrs), w/ PSHx of unspecified GI surgery reportedly for GIB, and PMHx of HTN who was initially BIBEMS for hypotension and hypothermia, admitted to MICU and stabilized, and evaluated on RMF for hypoadrenalism and psychosis.     Pt assessed at bedside. Reports good PO intake, tolerating diet well. No nutrition issues at this time. Reinforced need for adequate intake. RD to follow, nutrition recommendations below.    Previous Nutrition Diagnosis:  No active nutrition diagnosis, pt meeting estimated nutrition needs    New PES:  Increased nutrient needs (kcal, protein, micronutrients) R/T undomeciled status, clinical course as evidenced by increased metabolic demand for nutrients s/p critical state, suspected repletion needs    Goal: Consistently meet >75% EER    Recommendations:  1. DASH/TLC diet + ensure enlive/once/day  --Follow intake closely, adjust prn  2. Continue MVI  3. Monitor electrolytes, adjust and replete prn  4. Pain and bowel regimen per team   5. RD diet edu prn      Education: Reviewed adequate intake parameters    Risk Level: High [   ] Moderate [ X  ] Low [   ]

## 2023-03-21 NOTE — PROGRESS NOTE ADULT - PROBLEM SELECTOR PLAN 3
Initially minimally interactive only nodding head, now returned to baselined MS (A+Ox3) and does remember events leading up to 911 being called. CT head neg for trauma/acute changes. Likely etiology hypothermia. Currently mentating well, AOx3. Some degree/element of confusion assessed on physical examination 3/17 (see Hx of GI bleed). 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 Pt's previous nursing home contacted by SW who mentioned Mr. Mathur went out for an appointment on 2/16 and "ran away". He was previously a resident there for 2 years. Pt now s/p Psychiatry consult for noted psychosis. Pt noted to be hypocortisolemic on 3/17 to 5.4. ACTH stimulation test on 3/18 showing baseline ACTH of 62.6, with stim baseline of 4.7. 30 min after admin of Cortrosyn 0.25mg @ 10AM 3/18 showed rise to just 9.7. 1 hour later, stable at 9.7. 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    Plan:   - Utox neg  - Per psych, increase Abilify 5>10mg daily   - Endocrine consulted, appreciate recs   - monitor LFTs  - repeat EKG in AM Initially minimally interactive only nodding head, now returned to baselined MS (A+Ox3) and does remember events leading up to 911 being called. CT head neg for trauma/acute changes. Likely etiology hypothermia. Currently mentating well, AOx3. Some degree/element of confusion assessed on physical examination 3/17 (see Hx of GI bleed). 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 Pt's previous nursing home contacted by SW who mentioned Mr. Mathur went out for an appointment on 2/16 and "ran away". He was previously a resident there for 2 years. Pt now s/p Psychiatry consult for noted psychosis. Pt noted to be hypocortisolemic on 3/17 to 5.4. ACTH stimulation test on 3/18 showing baseline ACTH of 62.6, with stim baseline of 4.7. 30 min after admin of Cortrosyn 0.25mg @ 10AM 3/18 showed rise to just 9.7. 1 hour later, stable at 9.7. 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 UTox negative.   Plan:   - Psych consulted, appreciate recs, pt currently receiving Abilify 10mg for psychosis  - Will require safety assessment 3/22 prior to d/c   - Endocrine consulted, appreciate recs   - monitor LFTs  - repeat EKG in AM No Exposure

## 2023-03-21 NOTE — PROGRESS NOTE ADULT - PROBLEM SELECTOR PLAN 2
3/15 CT chest showing RLL segmental PE w/o evidence of RV dysfunction. B/l lower extremity duplex studies showing no evidence of DVTs bilaterally. Pt started on Lovenox 70mg BID on 3/15.   Plan:   - c/w eliquis 10mg BID 3/17 to 3/22, as pt has had Lovenox 3/15-3/17  - f/u TTE to evaluate for R heart strain   - poor candidate for Coumadin 2/2 hx of medication non-compliance and undomiciled 3/15 CT chest showing RLL segmental PE w/o evidence of RV dysfunction. B/l lower extremity duplex studies showing no evidence of DVTs bilaterally. Pt started on Lovenox 70mg BID on 3/15. TTE on 3/20/23 showing moderate concentric left ventricular hypertrophy with the left ventricle having a calculated ejection fraction of 65%  Plan:   - c/w eliquis 10mg BID 3/17 to 3/22, as pt has had Lovenox 3/15-3/17  - poor candidate for Coumadin 2/2 hx of medication non-compliance and undomiciled

## 2023-03-21 NOTE — PROGRESS NOTE ADULT - ASSESSMENT
71 y/o undomiciled Male current smoker (1ppd x50+yrs), w/ PSHx of unspecified GI surgery reportedly for GIB, and PMHx of HTN who was initially BIBEMS for hypotension and hypothermia, admitted to MICU and stabilized, and evaluated on RMF for hypoadrenalism and psychosis.

## 2023-03-21 NOTE — PROGRESS NOTE ADULT - SUBJECTIVE AND OBJECTIVE BOX
CC: Patient is a 72y old  Male who presents with a chief complaint of Hypothermia, AMS    INTERVAL EVENTS: LIZA  SUBJECTIVE / INTERVAL HPI: Patient seen and examined at bedside.   ROS: negative unless otherwise stated above.    VITAL SIGNS:  Vital Signs Last 24 Hrs  T(C): 36.6 (21 Mar 2023 06:16), Max: 37 (20 Mar 2023 20:56)  T(F): 97.8 (21 Mar 2023 06:16), Max: 98.6 (20 Mar 2023 20:56)  HR: 50 (21 Mar 2023 06:16) (50 - 56)  BP: 120/63 (21 Mar 2023 06:16) (105/54 - 120/63)  BP(mean): 81 (20 Mar 2023 12:00) (81 - 81)  RR: 18 (21 Mar 2023 06:16) (18 - 18)  SpO2: 98% (21 Mar 2023 06:16) (97% - 98%)    Parameters below as of 21 Mar 2023 06:16  Patient On (Oxygen Delivery Method): room air          03-19-23 @ 07:01  -  03-20-23 @ 07:00  --------------------------------------------------------  IN: 340 mL / OUT: 3550 mL / NET: -3210 mL    03-20-23 @ 07:01  -  03-21-23 @ 06:19  --------------------------------------------------------  IN: 0 mL / OUT: 1535 mL / NET: -1535 mL        PHYSICAL EXAM:    General: NAD  HEENT: MMM  Neck: supple  Cardiovascular: +S1/S2; RRR  Respiratory: CTA B/L; no W/R/R  Gastrointestinal: soft, NT/ND  Extremities: WWP; no edema, clubbing or cyanosis  Vascular: 2+ radial, DP/PT pulses B/L  Neurological: AAOx3; no focal deficits    MEDICATIONS:  MEDICATIONS  (STANDING):  apixaban 10 milliGRAM(s) Oral two times a day  ARIPiprazole 10 milliGRAM(s) Oral daily  influenza  Vaccine (HIGH DOSE) 0.7 milliLiter(s) IntraMuscular once  multivitamin 1 Tablet(s) Oral daily  nicotine - 21 mG/24Hr(s) Patch 1 Patch Transdermal daily  pantoprazole    Tablet 40 milliGRAM(s) Oral before breakfast    MEDICATIONS  (PRN):      ALLERGIES:  Allergies    No Known Allergies    Intolerances        LABS:                        10.2   5.65  )-----------( 198      ( 20 Mar 2023 12:00 )             33.3     03-20    132<L>  |  99  |  26<H>  ----------------------------<  112<H>  4.5   |  23  |  1.14    Ca    9.4      20 Mar 2023 12:00  Phos  3.8     03-20  Mg     2.0     03-20    TPro  7.4  /  Alb  3.0<L>  /  TBili  0.2  /  DBili  <0.2  /  AST  64<H>  /  ALT  62<H>  /  AlkPhos  255<H>  03-20        CAPILLARY BLOOD GLUCOSE      POCT Blood Glucose.: 93 mg/dL (21 Mar 2023 00:57)      RADIOLOGY & ADDITIONAL TESTS: Reviewed. CC: Patient is a 72y old  Male who presents with a chief complaint of Hypothermia, AMS    INTERVAL EVENTS: LIZA  SUBJECTIVE / INTERVAL HPI: Patient seen and examined at bedside. Pt denies any sx at this time including SOB and cough, palpitations and CP, headaches, fevers, chills, nausea, vomiting, diarrhea, constipation, dysuria, hematuria, hematochezia. Pt mentions he was previously a  at [employer] and had previously been employed mopping/sweeping the floors. Pt adds that he was on the surgical service ~ 2 years ago although no documentation of any of the former statements can be verified.   ROS: negative unless otherwise stated above.    VITAL SIGNS:  Vital Signs Last 24 Hrs  T(C): 36.6 (21 Mar 2023 06:16), Max: 37 (20 Mar 2023 20:56)  T(F): 97.8 (21 Mar 2023 06:16), Max: 98.6 (20 Mar 2023 20:56)  HR: 50 (21 Mar 2023 06:16) (50 - 56)  BP: 120/63 (21 Mar 2023 06:16) (105/54 - 120/63)  BP(mean): 81 (20 Mar 2023 12:00) (81 - 81)  RR: 18 (21 Mar 2023 06:16) (18 - 18)  SpO2: 98% (21 Mar 2023 06:16) (97% - 98%)    Parameters below as of 21 Mar 2023 06:16  Patient On (Oxygen Delivery Method): room air          03-19-23 @ 07:01  -  03-20-23 @ 07:00  --------------------------------------------------------  IN: 340 mL / OUT: 3550 mL / NET: -3210 mL    03-20-23 @ 07:01  -  03-21-23 @ 06:19  --------------------------------------------------------  IN: 0 mL / OUT: 1535 mL / NET: -1535 mL        PHYSICAL EXAM:    General: NAD  HEENT: MMM  Neck: supple  Cardiovascular: +S1/S2; RRR  Respiratory: CTA B/L; no W/R/R  Gastrointestinal: soft, NT/ND  Extremities: WWP; no edema, clubbing or cyanosis  Vascular: 2+ radial, DP/PT pulses B/L  Neurological: AAOx3; no focal deficits    MEDICATIONS:  MEDICATIONS  (STANDING):  apixaban 10 milliGRAM(s) Oral two times a day  ARIPiprazole 10 milliGRAM(s) Oral daily  influenza  Vaccine (HIGH DOSE) 0.7 milliLiter(s) IntraMuscular once  multivitamin 1 Tablet(s) Oral daily  nicotine - 21 mG/24Hr(s) Patch 1 Patch Transdermal daily  pantoprazole    Tablet 40 milliGRAM(s) Oral before breakfast    MEDICATIONS  (PRN):      ALLERGIES:  Allergies    No Known Allergies    Intolerances        LABS:                        10.2   5.65  )-----------( 198      ( 20 Mar 2023 12:00 )             33.3     03-20    132<L>  |  99  |  26<H>  ----------------------------<  112<H>  4.5   |  23  |  1.14    Ca    9.4      20 Mar 2023 12:00  Phos  3.8     03-20  Mg     2.0     03-20    TPro  7.4  /  Alb  3.0<L>  /  TBili  0.2  /  DBili  <0.2  /  AST  64<H>  /  ALT  62<H>  /  AlkPhos  255<H>  03-20        CAPILLARY BLOOD GLUCOSE      POCT Blood Glucose.: 93 mg/dL (21 Mar 2023 00:57)      RADIOLOGY & ADDITIONAL TESTS: Reviewed. CC: Patient is a 72y old  Male who presents with a chief complaint of Hypothermia, AMS    INTERVAL EVENTS: Labwork this AM running late due to phlebotomy shortage. Upon reaching pt, pt insisted he was here for cataract surgery on his R eye. Discussed with pt his hospital course and reasoning for labwork including to test hormonal levels to rule out adrenal insufficiency in the setting of hypotensive, bradycardic hypothermia which pt admitted to MICU for. Pt refusing labs at this time insisting he is not sure what their purpose is. Despite efforts to understand pt's reasoning, pt declined further blood draws today.   SUBJECTIVE / INTERVAL HPI: Patient seen and examined at bedside. Pt denies any sx at this time including SOB and cough, palpitations and CP, headaches, fevers, chills, nausea, vomiting, diarrhea, constipation, dysuria, hematuria, hematochezia. 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 Pt adds that he was on the surgical service ~ 2 years ago although no documentation of any of the former statements can be verified.   ROS: negative unless otherwise stated above.    VITAL SIGNS:  Vital Signs Last 24 Hrs  T(C): 36.6 (21 Mar 2023 06:16), Max: 37 (20 Mar 2023 20:56)  T(F): 97.8 (21 Mar 2023 06:16), Max: 98.6 (20 Mar 2023 20:56)  HR: 50 (21 Mar 2023 06:16) (50 - 56)  BP: 120/63 (21 Mar 2023 06:16) (105/54 - 120/63)  BP(mean): 81 (20 Mar 2023 12:00) (81 - 81)  RR: 18 (21 Mar 2023 06:16) (18 - 18)  SpO2: 98% (21 Mar 2023 06:16) (97% - 98%)    Parameters below as of 21 Mar 2023 06:16  Patient On (Oxygen Delivery Method): room air          03-19-23 @ 07:01  -  03-20-23 @ 07:00  --------------------------------------------------------  IN: 340 mL / OUT: 3550 mL / NET: -3210 mL    03-20-23 @ 07:01  -  03-21-23 @ 06:19  --------------------------------------------------------  IN: 0 mL / OUT: 1535 mL / NET: -1535 mL        PHYSICAL EXAM:  General: Alert and oriented x 3. No acute distress.  Eyes: EOMI. Anicteric.   HEENT: Moist mucous membranes. No scleral icterus. No cervical lymphadenopathy. Missing teeth on upper and lower mandible   Lungs: No accessory muscle use. CTAB   Cardiovascular: Regular rate and rhythm. No murmur. No JVD.  Abdomen: Soft, non-tender and non-distended. No palpable masses. Ventral abdominal scar noted.   Extremities: Non-tender. Resolved edema in b/l lower extremities. Pulses intact b/l.   Skin: No rashes or lesions. Warm.  Neurologic: No focal neurological deficits. CN II-XII grossly intact, but not individually tested.  Psychiatric: + Slightly paranoid affect, tangential speech     MEDICATIONS:  MEDICATIONS  (STANDING):  apixaban 10 milliGRAM(s) Oral two times a day  ARIPiprazole 10 milliGRAM(s) Oral daily  influenza  Vaccine (HIGH DOSE) 0.7 milliLiter(s) IntraMuscular once  multivitamin 1 Tablet(s) Oral daily  nicotine - 21 mG/24Hr(s) Patch 1 Patch Transdermal daily  pantoprazole    Tablet 40 milliGRAM(s) Oral before breakfast    MEDICATIONS  (PRN):      ALLERGIES:  Allergies    No Known Allergies    Intolerances        LABS:                        10.2   5.65  )-----------( 198      ( 20 Mar 2023 12:00 )             33.3     03-20    132<L>  |  99  |  26<H>  ----------------------------<  112<H>  4.5   |  23  |  1.14    Ca    9.4      20 Mar 2023 12:00  Phos  3.8     03-20  Mg     2.0     03-20    TPro  7.4  /  Alb  3.0<L>  /  TBili  0.2  /  DBili  <0.2  /  AST  64<H>  /  ALT  62<H>  /  AlkPhos  255<H>  03-20        CAPILLARY BLOOD GLUCOSE      POCT Blood Glucose.: 93 mg/dL (21 Mar 2023 00:57)      RADIOLOGY & ADDITIONAL TESTS: Reviewed. CC: Patient is a 72y old  Male who presents with a chief complaint of Hypothermia, AMS    Hospital Course:  Mr Kevan is a 71 y/o undomiciled M (previously ran from nursing home on 2/16/23) with PMHx current active smoker (1PPD x 50 years), Psychosis, PSHx GI surgery, HTN who was BIBEMS for dizziness, difficulty with ambulation and confusion. Pt was initially found to be hypothermic to 81.5F (rectal), admitted to MICU for active rewarming protocol (warmed IVF, warmed bladder irrigation, bairhugger), levophed gtt for vasodilation while rewarming, and strict UOP monitoring s/p Ashley in ED. Upon arrival to MICU, patient warmed to 95-96F, AOx3, awake/alert, mentating well. Hospital course complicated by RLL segmental PE (no R heart strain), started on full dose lovenox and was stepped down to RMF shortly thereafter. On RMF, pt was monitored off of antibiotics (pt with no clear source of infection). Initially leukopenic which resolved 3/17, Urine leg and Strep neg, RVP neg. MRSA negative. UA negative. On 3/17, pt had RP Ultrasound showing compensatory enlargement of the right kidney measuring 13.3 cm in cephalocaudal dimensions in the setting of an absent left kidney. RUQ US on 3/19 showing fatty liver, transaminitis present on admission improving.   TTE on 3/20 showing grossly normal heart function. Pt no longer hypothermic, bradycardic, and hypotensive. Pt's course was c/b psychotic thought process (pt believes he was previously employed at [employer] with permanent housing, pt believes he had surgery at [employer] exactly 2 years ago, amongst other statements illustrated in Psychiatry notes). Currently, pt is pending Psychiatric safety evaluation and further workup of possible adrenocorticoid deficiency due to low cortisol levels and history of hypothermia/bradycardia/hypoglycemia leading to pt's admission.     INTERVAL EVENTS: Labwork this AM running late due to phlebotomy shortage. Upon reaching pt, pt insisted he was here for cataract surgery on his R eye. Discussed with pt his hospital course and reasoning for labwork including to test hormonal levels to rule out adrenal insufficiency in the setting of hypotensive, bradycardic hypothermia which pt admitted to MICU for. Pt refusing labs at this time insisting he is not sure what their purpose is. Despite efforts to understand pt's reasoning, pt declined further blood draws today.   SUBJECTIVE / INTERVAL HPI: Patient seen and examined at bedside. Pt denies any sx at this time including SOB and cough, palpitations and CP, headaches, fevers, chills, nausea, vomiting, diarrhea, constipation, dysuria, hematuria, hematochezia. Pt mentions he was previously a  at [employer] and had previously been employed mopping/sweeping the floors. Pt adds that he was on the surgical service ~ 2 years ago although no documentation of any of the former statements can be verified.   ROS: negative unless otherwise stated above.    VITAL SIGNS:  Vital Signs Last 24 Hrs  T(C): 36.6 (21 Mar 2023 06:16), Max: 37 (20 Mar 2023 20:56)  T(F): 97.8 (21 Mar 2023 06:16), Max: 98.6 (20 Mar 2023 20:56)  HR: 50 (21 Mar 2023 06:16) (50 - 56)  BP: 120/63 (21 Mar 2023 06:16) (105/54 - 120/63)  BP(mean): 81 (20 Mar 2023 12:00) (81 - 81)  RR: 18 (21 Mar 2023 06:16) (18 - 18)  SpO2: 98% (21 Mar 2023 06:16) (97% - 98%)    Parameters below as of 21 Mar 2023 06:16  Patient On (Oxygen Delivery Method): room air          03-19-23 @ 07:01  -  03-20-23 @ 07:00  --------------------------------------------------------  IN: 340 mL / OUT: 3550 mL / NET: -3210 mL    03-20-23 @ 07:01  -  03-21-23 @ 06:19  --------------------------------------------------------  IN: 0 mL / OUT: 1535 mL / NET: -1535 mL        PHYSICAL EXAM:  General: Alert and oriented x 3. No acute distress.  Eyes: EOMI. Anicteric.   HEENT: Moist mucous membranes. No scleral icterus. No cervical lymphadenopathy. Missing teeth on upper and lower mandible   Lungs: No accessory muscle use. CTAB   Cardiovascular: Regular rate and rhythm. No murmur. No JVD.  Abdomen: Soft, non-tender and non-distended. No palpable masses. Ventral abdominal scar noted.   Extremities: Non-tender. Resolved edema in b/l lower extremities. Pulses intact b/l.   Skin: No rashes or lesions. Warm.  Neurologic: No focal neurological deficits. CN II-XII grossly intact, but not individually tested.  Psychiatric: + Slightly paranoid affect, tangential speech     MEDICATIONS:  MEDICATIONS  (STANDING):  apixaban 10 milliGRAM(s) Oral two times a day  ARIPiprazole 10 milliGRAM(s) Oral daily  influenza  Vaccine (HIGH DOSE) 0.7 milliLiter(s) IntraMuscular once  multivitamin 1 Tablet(s) Oral daily  nicotine - 21 mG/24Hr(s) Patch 1 Patch Transdermal daily  pantoprazole    Tablet 40 milliGRAM(s) Oral before breakfast    MEDICATIONS  (PRN):      ALLERGIES:  Allergies    No Known Allergies    Intolerances        LABS:                        10.2   5.65  )-----------( 198      ( 20 Mar 2023 12:00 )             33.3     03-20    132<L>  |  99  |  26<H>  ----------------------------<  112<H>  4.5   |  23  |  1.14    Ca    9.4      20 Mar 2023 12:00  Phos  3.8     03-20  Mg     2.0     03-20    TPro  7.4  /  Alb  3.0<L>  /  TBili  0.2  /  DBili  <0.2  /  AST  64<H>  /  ALT  62<H>  /  AlkPhos  255<H>  03-20        CAPILLARY BLOOD GLUCOSE      POCT Blood Glucose.: 93 mg/dL (21 Mar 2023 00:57)      RADIOLOGY & ADDITIONAL TESTS: Reviewed. CC: Patient is a 72y old  Male who presents with a chief complaint of Hypothermia, AMS    Hospital Course:  Mr Mathur is a 73 y/o undomiciled M (previously ran from nursing home on 2/16/23) with PMHx current active smoker (1PPD x 50 years), Psychosis, PSHx GI surgery, HTN who was BIBEMS for dizziness, difficulty with ambulation and confusion. Pt was initially found to be hypothermic to 81.5F (rectal), admitted to MICU for active rewarming protocol (warmed IVF, warmed bladder irrigation, bairhugger), levophed gtt for vasodilation while rewarming, and strict UOP monitoring s/p Ashley in ED. Upon arrival to MICU, patient warmed to 95-96F, AOx3, awake/alert, mentating well. Hospital course complicated by RLL segmental PE (no R heart strain), started on full dose lovenox -> switched to Eliquis, and was stepped down to RMF shortly thereafter. On RMF, pt was monitored off of antibiotics (pt with no clear source of infection). Initially leukopenic which resolved 3/17, Urine leg and Strep neg, RVP neg. MRSA negative. UA negative. On 3/17, pt had RP Ultrasound showing compensatory enlargement of the right kidney measuring 13.3 cm in cephalocaudal dimensions in the setting of an absent left kidney. RUQ US on 3/19 showing fatty liver, transaminitis present on admission improving. TTE on 3/20 showing grossly normal heart function. Pt no longer hypothermic, bradycardic, and hypotensive. Pt's course was c/b psychotic thought process (pt believes he was previously employed at [employer] with permanent housing, pt believes he had surgery at [facility] [facility] [employer] exactly 2 years ago, amongst other statements illustrated in Psychiatry notes). Currently, pt is pending Psychiatric safety evaluation and further workup of possible adrenocorticoid deficiency due to low cortisol levels and history of hypothermia/bradycardia/hypoglycemia leading to pt's admission.       INTERVAL EVENTS: Labwork this AM running late due to phlebotomy shortage. Upon reaching pt, pt insisted he was here for cataract surgery on his R eye. Discussed with pt his hospital course and reasoning for labwork including to test hormonal levels to rule out adrenal insufficiency in the setting of hypotensive, bradycardic hypothermia which pt admitted to MICU for. Pt refusing labs at this time insisting he is not sure what their purpose is. Despite efforts to understand pt's reasoning, pt declined further blood draws today.   SUBJECTIVE / INTERVAL HPI: Patient seen and examined at bedside. Pt denies any sx at this time including SOB and cough, palpitations and CP, headaches, fevers, chills, nausea, vomiting, diarrhea, constipation, dysuria, hematuria, hematochezia. 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 Pt adds that he was on the surgical service ~ 2 years ago although no documentation of any of the former statements can be verified.   ROS: negative unless otherwise stated above.    VITAL SIGNS:  Vital Signs Last 24 Hrs  T(C): 36.6 (21 Mar 2023 06:16), Max: 37 (20 Mar 2023 20:56)  T(F): 97.8 (21 Mar 2023 06:16), Max: 98.6 (20 Mar 2023 20:56)  HR: 50 (21 Mar 2023 06:16) (50 - 56)  BP: 120/63 (21 Mar 2023 06:16) (105/54 - 120/63)  BP(mean): 81 (20 Mar 2023 12:00) (81 - 81)  RR: 18 (21 Mar 2023 06:16) (18 - 18)  SpO2: 98% (21 Mar 2023 06:16) (97% - 98%)    Parameters below as of 21 Mar 2023 06:16  Patient On (Oxygen Delivery Method): room air          03-19-23 @ 07:01  -  03-20-23 @ 07:00  --------------------------------------------------------  IN: 340 mL / OUT: 3550 mL / NET: -3210 mL    03-20-23 @ 07:01  -  03-21-23 @ 06:19  --------------------------------------------------------  IN: 0 mL / OUT: 1535 mL / NET: -1535 mL        PHYSICAL EXAM:  General: Alert and oriented x 3. No acute distress.  Eyes: EOMI. Anicteric.   HEENT: Moist mucous membranes. No scleral icterus. No cervical lymphadenopathy. Missing teeth on upper and lower mandible   Lungs: No accessory muscle use. CTAB   Cardiovascular: Regular rate and rhythm. No murmur. No JVD.  Abdomen: Soft, non-tender and non-distended. No palpable masses. Ventral abdominal scar noted.   Extremities: Non-tender. Resolved edema in b/l lower extremities. Pulses intact b/l.   Skin: No rashes or lesions. Warm.  Neurologic: No focal neurological deficits. CN II-XII grossly intact, but not individually tested.  Psychiatric: + Slightly paranoid affect, tangential speech     MEDICATIONS:  MEDICATIONS  (STANDING):  apixaban 10 milliGRAM(s) Oral two times a day  ARIPiprazole 10 milliGRAM(s) Oral daily  influenza  Vaccine (HIGH DOSE) 0.7 milliLiter(s) IntraMuscular once  multivitamin 1 Tablet(s) Oral daily  nicotine - 21 mG/24Hr(s) Patch 1 Patch Transdermal daily  pantoprazole    Tablet 40 milliGRAM(s) Oral before breakfast    MEDICATIONS  (PRN):      ALLERGIES:  Allergies    No Known Allergies    Intolerances        LABS:                        10.2   5.65  )-----------( 198      ( 20 Mar 2023 12:00 )             33.3     03-20    132<L>  |  99  |  26<H>  ----------------------------<  112<H>  4.5   |  23  |  1.14    Ca    9.4      20 Mar 2023 12:00  Phos  3.8     03-20  Mg     2.0     03-20    TPro  7.4  /  Alb  3.0<L>  /  TBili  0.2  /  DBili  <0.2  /  AST  64<H>  /  ALT  62<H>  /  AlkPhos  255<H>  03-20        CAPILLARY BLOOD GLUCOSE      POCT Blood Glucose.: 93 mg/dL (21 Mar 2023 00:57)      RADIOLOGY & ADDITIONAL TESTS: Reviewed.

## 2023-03-21 NOTE — PROGRESS NOTE ADULT - PROBLEM SELECTOR PLAN 4
Pt with BUN/Cr of 10/0.80 on 3/15 in afternoon with subsequent rise to 14/1.15 on 3/16. Pt was previously in distributive shock, was stabilized with Levophed ggt @ 0.05mcg/kg/min run and d/c @ 1500 on 3/15/23. Suspecting abrupt rise secondary to hypovolemia/hypoperfusion. Urine studies obtained 3/16 showing Urine sodium of 63 and UrCrea of 36. FeNa calculated to 1.4% suggestive of intrinsic etiology. Suspecting source to be from frequent episodes of hypoperfusion causing ischemic ATN. BUN/Cr improving 3/20.   Plan:   - Suspecting plateau of Cr followed by polyuria in ATN.   - Continue to trend CMPs daily   - Avoid nephrotoxic agents Pt with BUN/Cr of 10/0.80 on 3/15 in afternoon with subsequent rise to 14/1.15 on 3/16. Pt was previously in distributive shock, was stabilized with Levophed ggt @ 0.05mcg/kg/min run and d/c @ 1500 on 3/15/23. Suspecting abrupt rise secondary to hypovolemia/hypoperfusion. Urine studies obtained 3/16 showing Urine sodium of 63 and UrCrea of 36. FeNa calculated to 1.4% suggestive of intrinsic etiology. Suspecting source to be from frequent episodes of hypoperfusion causing ischemic ATN. BUN/Cr improving 3/21.   Plan:   - Suspecting plateau of Cr followed by polyuria in ATN.   - Continue to trend CMPs daily   - Avoid nephrotoxic agents

## 2023-03-22 LAB
GLUCOSE BLDC GLUCOMTR-MCNC: 86 MG/DL — SIGNIFICANT CHANGE UP (ref 70–99)
GLUCOSE BLDC GLUCOMTR-MCNC: 88 MG/DL — SIGNIFICANT CHANGE UP (ref 70–99)
GLUCOSE BLDC GLUCOMTR-MCNC: 90 MG/DL — SIGNIFICANT CHANGE UP (ref 70–99)
GLUCOSE BLDC GLUCOMTR-MCNC: 90 MG/DL — SIGNIFICANT CHANGE UP (ref 70–99)

## 2023-03-22 PROCEDURE — 99232 SBSQ HOSP IP/OBS MODERATE 35: CPT | Mod: GC

## 2023-03-22 PROCEDURE — 99231 SBSQ HOSP IP/OBS SF/LOW 25: CPT

## 2023-03-22 RX ORDER — ARIPIPRAZOLE 15 MG/1
1 TABLET ORAL
Qty: 30 | Refills: 2
Start: 2023-03-22 | End: 2023-06-19

## 2023-03-22 RX ORDER — APIXABAN 2.5 MG/1
1 TABLET, FILM COATED ORAL
Qty: 180 | Refills: 0
Start: 2023-03-22 | End: 2023-06-19

## 2023-03-22 RX ORDER — NICOTINE POLACRILEX 2 MG
21 GUM BUCCAL
Qty: 30 | Refills: 0
Start: 2023-03-22 | End: 2023-04-20

## 2023-03-22 RX ORDER — OMEPRAZOLE 10 MG/1
1 CAPSULE, DELAYED RELEASE ORAL
Qty: 30 | Refills: 0
Start: 2023-03-22 | End: 2023-04-20

## 2023-03-22 RX ORDER — APIXABAN 2.5 MG/1
1 TABLET, FILM COATED ORAL
Qty: 60 | Refills: 0
Start: 2023-03-22 | End: 2023-04-20

## 2023-03-22 RX ADMIN — Medication 1 PATCH: at 09:58

## 2023-03-22 RX ADMIN — Medication 1 PATCH: at 11:03

## 2023-03-22 RX ADMIN — PANTOPRAZOLE SODIUM 40 MILLIGRAM(S): 20 TABLET, DELAYED RELEASE ORAL at 06:50

## 2023-03-22 RX ADMIN — Medication 1 PATCH: at 07:38

## 2023-03-22 RX ADMIN — APIXABAN 10 MILLIGRAM(S): 2.5 TABLET, FILM COATED ORAL at 06:50

## 2023-03-22 RX ADMIN — ARIPIPRAZOLE 10 MILLIGRAM(S): 15 TABLET ORAL at 11:03

## 2023-03-22 RX ADMIN — Medication 1 PATCH: at 17:08

## 2023-03-22 RX ADMIN — Medication 1 TABLET(S): at 11:03

## 2023-03-22 RX ADMIN — APIXABAN 10 MILLIGRAM(S): 2.5 TABLET, FILM COATED ORAL at 17:10

## 2023-03-22 NOTE — BH CONSULTATION LIAISON PROGRESS NOTE - NSBHTIMEACTIVITIESPERFORMED_PSY_A_CORE
assessment, chart review, psychoeducation, decision making
assessment, chart review, psychoeducation, decision making

## 2023-03-22 NOTE — BH CONSULTATION LIAISON PROGRESS NOTE - CURRENT MEDICATION
MEDICATIONS  (STANDING):  apixaban 10 milliGRAM(s) Oral two times a day  ARIPiprazole 10 milliGRAM(s) Oral daily  influenza  Vaccine (HIGH DOSE) 0.7 milliLiter(s) IntraMuscular once  multivitamin 1 Tablet(s) Oral daily  nicotine - 21 mG/24Hr(s) Patch 1 Patch Transdermal daily  pantoprazole    Tablet 40 milliGRAM(s) Oral before breakfast    MEDICATIONS  (PRN):  
MEDICATIONS  (STANDING):  apixaban 10 milliGRAM(s) Oral every 12 hours  ARIPiprazole 5 milliGRAM(s) Oral <User Schedule>  cosyntropin Injectable 0.25 milliGRAM(s) IV Push once  influenza  Vaccine (HIGH DOSE) 0.7 milliLiter(s) IntraMuscular once  nicotine - 21 mG/24Hr(s) Patch 1 Patch Transdermal daily  pantoprazole    Tablet 40 milliGRAM(s) Oral before breakfast    MEDICATIONS  (PRN):  
MEDICATIONS  (STANDING):  apixaban 10 milliGRAM(s) Oral two times a day  ARIPiprazole 10 milliGRAM(s) Oral daily  influenza  Vaccine (HIGH DOSE) 0.7 milliLiter(s) IntraMuscular once  multivitamin 1 Tablet(s) Oral daily  nicotine - 21 mG/24Hr(s) Patch 1 Patch Transdermal daily  pantoprazole    Tablet 40 milliGRAM(s) Oral before breakfast    MEDICATIONS  (PRN):

## 2023-03-22 NOTE — BH CONSULTATION LIAISON PROGRESS NOTE - NSBHFUPINTERVALHXFT_PSY_A_CORE
Chart reviewed, patient seen today. Patient started on Abilify 5mg daily for suspected psychotic disorder, adherent to regimen thus far. He denied any side effects or acute concerns. Denied any SI, HI, AH, paranoid thoughts. Noted staff was treating him appropriately and maintained concrete, linear responses to questions. Calm, cooperative, in behavioral control. AOx3 and attentive. 
Patient was seen at bedside. He presents calm, cooperative. He denies any recent mood symptoms or AVH/Pi. He denies SI/HI. He reports prior history of individual psychotherapy but denies psychiatry follow up. He is not currently interested in outpatient mental health follow up
Patient was seen at bedside. He presents calm, cooperative. Pt is disorganized in his thought process at times, with possible confabulation. However there is no evidence of acute SI/HI/AVH. Pt declines inpatient psychiatric admission and is not currently interested in outpatient mental health follow up.   Pt reports that he is a , not [facility]

## 2023-03-22 NOTE — PROGRESS NOTE ADULT - SUBJECTIVE AND OBJECTIVE BOX
OVERNIGHT EVENTS: LIZA    SUBJECTIVE / INTERVAL HPI: Patient with disorganized thought process. Normal BM yesterday night, urinating without difficulty. No SOB     VITAL SIGNS:  Vital Signs Last 24 Hrs  T(C): 37.2 (22 Mar 2023 12:13), Max: 37.2 (21 Mar 2023 21:13)  T(F): 98.9 (22 Mar 2023 12:13), Max: 98.9 (21 Mar 2023 21:13)  HR: 57 (22 Mar 2023 12:13) (56 - 57)  BP: 128/68 (22 Mar 2023 12:13) (115/64 - 128/68)  BP(mean): --  RR: 16 (22 Mar 2023 12:13) (16 - 17)  SpO2: 100% (22 Mar 2023 12:13) (98% - 100%)    Parameters below as of 22 Mar 2023 12:13  Patient On (Oxygen Delivery Method): room air        PHYSICAL EXAM:    General: No distress   Eyes: EOMI. Anicteric.   HEENT: Moist mucous membranes. No scleral icterus. No cervical lymphadenopathy. Missing teeth on upper and lower mandible   Lungs: No accessory muscle use. CTAB   Cardiovascular: Regular rate and rhythm. No murmur. No JVD.  Abdomen: Soft, non-tender and non-distended. No palpable masses. Ventral abdominal scar noted.   Extremities: Non-tender. Resolved edema in b/l lower extremities. Pulses intact b/l.   Skin: No rashes or lesions. Warm.  Neurologic: would not participate in neuro exam   Psychiatric: disorganized thought process    MEDICATIONS:  MEDICATIONS  (STANDING):  ARIPiprazole 10 milliGRAM(s) Oral daily  influenza  Vaccine (HIGH DOSE) 0.7 milliLiter(s) IntraMuscular once  multivitamin 1 Tablet(s) Oral daily  nicotine - 21 mG/24Hr(s) Patch 1 Patch Transdermal daily  pantoprazole    Tablet 40 milliGRAM(s) Oral before breakfast    MEDICATIONS  (PRN):      ALLERGIES:  Allergies    No Known Allergies    Intolerances        LABS:              CAPILLARY BLOOD GLUCOSE      POCT Blood Glucose.: 86 mg/dL (22 Mar 2023 16:49)      RADIOLOGY & ADDITIONAL TESTS: Reviewed.

## 2023-03-22 NOTE — PROGRESS NOTE ADULT - PROBLEM SELECTOR PLAN 8
Resolved   No evidence of DVTs on venous duplex studies performed inpatient. Pt noted to be hypoalbuminemic on admission.  Pending TTE. Suspecting low oncotic pressure as source of lower extremity edema, which has improved on examination 3/17  Plan:   - TTE:   The left ventricle is normal in size and systolic function with a calculated ejection fraction of 65%. Doppler findings are not conclusive but are suggestive of grade I diastolc dysfunction.    - HepC negative

## 2023-03-22 NOTE — BH CONSULTATION LIAISON PROGRESS NOTE - NSBHCHARTREVIEWVS_PSY_A_CORE FT
Vital Signs Last 24 Hrs  T(C): 37.2 (22 Mar 2023 12:13), Max: 37.2 (21 Mar 2023 21:13)  T(F): 98.9 (22 Mar 2023 12:13), Max: 98.9 (21 Mar 2023 21:13)  HR: 57 (22 Mar 2023 12:13) (56 - 57)  BP: 128/68 (22 Mar 2023 12:13) (115/64 - 128/68)  BP(mean): --  RR: 16 (22 Mar 2023 12:13) (16 - 17)  SpO2: 100% (22 Mar 2023 12:13) (98% - 100%)    Parameters below as of 22 Mar 2023 12:13  Patient On (Oxygen Delivery Method): room air    
Vital Signs Last 24 Hrs  T(C): 36.4 (20 Mar 2023 12:00), Max: 37.2 (19 Mar 2023 21:13)  T(F): 97.6 (20 Mar 2023 12:00), Max: 98.9 (19 Mar 2023 21:13)  HR: 54 (20 Mar 2023 12:00) (51 - 54)  BP: 117/63 (20 Mar 2023 12:00) (105/60 - 127/76)  BP(mean): 81 (20 Mar 2023 12:00) (81 - 81)  RR: 18 (20 Mar 2023 12:00) (16 - 18)  SpO2: 98% (20 Mar 2023 12:00) (97% - 98%)    Parameters below as of 20 Mar 2023 12:00  Patient On (Oxygen Delivery Method): room air    
Vital Signs Last 24 Hrs  T(C): 36.9 (18 Mar 2023 05:39), Max: 37 (17 Mar 2023 11:20)  T(F): 98.5 (18 Mar 2023 05:39), Max: 98.6 (17 Mar 2023 11:20)  HR: 58 (18 Mar 2023 05:39) (57 - 63)  BP: 137/70 (18 Mar 2023 05:39) (119/61 - 137/70)  BP(mean): --  RR: 18 (18 Mar 2023 05:39) (18 - 18)  SpO2: 99% (18 Mar 2023 05:39) (97% - 99%)    Parameters below as of 17 Mar 2023 21:02  Patient On (Oxygen Delivery Method): room air

## 2023-03-22 NOTE — PROGRESS NOTE ADULT - PROBLEM SELECTOR PLAN 2
3/15 CT chest showing RLL segmental PE w/o evidence of RV dysfunction. B/l lower extremity duplex studies showing no evidence of DVTs bilaterally. Pt started on Lovenox 70mg BID on 3/15. TTE on 3/20/23 showing moderate concentric left ventricular hypertrophy with the left ventricle having a calculated ejection fraction of 65%  Plan:   - c/w eliquis 10mg BID 3/17 to 3/22, as pt has had Lovenox 3/15-3/17  - poor candidate for Coumadin 2/2 hx of medication non-compliance and undomiciled

## 2023-03-22 NOTE — BH CONSULTATION LIAISON PROGRESS NOTE - ORIENTATION
refusing to participate in full cognitive evaluation. Knows he is at [facility], able to recall "March 2023"

## 2023-03-22 NOTE — PROGRESS NOTE ADULT - PROBLEM SELECTOR PLAN 1
Suspect possible DILI 2/2 abilify (<1% incidence). No previous hepatic pathology on previous imaging. Plan for repeat abd u/s but may need to address therapeutic change with behavioral health. RUQ US on 3/19 showing normal liver morphology and smooth surfaces. Increased echogenicity and attenuation of the sound beam posteriorly suggestive of steatosis. No focal lesion. Main portal vein is patent, with hepatopedal flow.  Likely 2/2 diastolic dysfunction seen on TTE   Plan:   - Hepatitis panel ordered for AM 3/22, however pt refusing labs    - Continue to monitor for now

## 2023-03-22 NOTE — PROGRESS NOTE ADULT - PROBLEM SELECTOR PLAN 3
Initially minimally interactive only nodding head, now returned to baselined MS (A+Ox3) and does remember events leading up to 911 being called. CT head neg for trauma/acute changes. Likely etiology hypothermia. Currently mentating well, AOx3. Some degree/element of confusion assessed on physical examination 3/17 (see Hx of GI bleed). 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 Pt's previous nursing home contacted by SW who mentioned Mr. Mathur went out for an appointment on 2/16 and "ran away". He was previously a resident there for 2 years. Pt now s/p Psychiatry consult for noted psychosis. Pt noted to be hypocortisolemic on 3/17 to 5.4. ACTH stimulation test on 3/18 showing baseline ACTH of 62.6, with stim baseline of 4.7. 30 min after admin of Cortrosyn 0.25mg @ 10AM 3/18 showed rise to just 9.7. 1 hour later, stable at 9.7. 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 UTox negative.   Plan:   - Psych consulted, appreciate recs, pt currently receiving Abilify 10mg for psychosis  - No psychiatric contraindication to discharge per psych. However, patient refusing discharge   - Endocrine consulted, appreciate recs   - monitor LFTs

## 2023-03-22 NOTE — PROGRESS NOTE ADULT - PROBLEM SELECTOR PLAN 4
Pt with BUN/Cr of 10/0.80 on 3/15 in afternoon with subsequent rise to 14/1.15 on 3/16. Pt was previously in distributive shock, was stabilized with Levophed ggt @ 0.05mcg/kg/min run and d/c @ 1500 on 3/15/23. Suspecting abrupt rise secondary to hypovolemia/hypoperfusion. Urine studies obtained 3/16 showing Urine sodium of 63 and UrCrea of 36. FeNa calculated to 1.4% suggestive of intrinsic etiology. Suspecting source to be from frequent episodes of hypoperfusion causing ischemic ATN. BUN/Cr improving 3/21.   Plan:   - Suspecting plateau of Cr followed by polyuria in ATN.   - Continue to trend CMPs daily   - Avoid nephrotoxic agents

## 2023-03-22 NOTE — BH CONSULTATION LIAISON PROGRESS NOTE - NSBHASSESSMENTFT_PSY_ALL_CORE
71yo man, homeless, history of psychosis, possible dementia, HTN, GI bleed, no known past psychiatric or medical care, BIBEMS for AMS, hypothermia, dizziness, admitted to MICU for rewarming protocol and workup revealed PE. QTc initially 524, improved to 481 on 3/16. Psychiatry consulted for evaluation of psychosis. On the initial evaluation patient was found to exhibit delusional thought content, otherwise cognitively and behaviorally appropriate. He was stared on Abilify. On re-assessment today pt presents calm, cooperative, denies mood sx/AVH/SI/HI. MSE is significant for disorganized thought process at times and possible confabulation, likely chronic suggestive of neurocognitive disorder. Pt with likely poor health literacy increasing his risk for non compliance with medical treatment. Pt declines voluntary intpatient psychiatric admission and does not meet criteria for involuntary psychiatric hospitalization. Pt will greatly benefit from NH placement given cognitive limitations.     RECOMMENDATIONS:  --No need for 1:1   --Pt is not interested in voluntary psychiatric treatment and does not meet criteria, at this time, for involuntary treatment  --Continue Abilify to 10mg daily for psychosis  --Follow serial EKGs to evaluate QTc; hold antipsychotics if QTc > 500ms  --CL to follow at needed, please call with questions/concerns.

## 2023-03-23 ENCOUNTER — TRANSCRIPTION ENCOUNTER (OUTPATIENT)
Age: 73
End: 2023-03-23

## 2023-03-23 LAB
GLUCOSE BLDC GLUCOMTR-MCNC: 104 MG/DL — HIGH (ref 70–99)
GLUCOSE BLDC GLUCOMTR-MCNC: 81 MG/DL — SIGNIFICANT CHANGE UP (ref 70–99)
GLUCOSE BLDC GLUCOMTR-MCNC: 97 MG/DL — SIGNIFICANT CHANGE UP (ref 70–99)
GLUCOSE BLDC GLUCOMTR-MCNC: 98 MG/DL — SIGNIFICANT CHANGE UP (ref 70–99)

## 2023-03-23 PROCEDURE — 99231 SBSQ HOSP IP/OBS SF/LOW 25: CPT | Mod: GC

## 2023-03-23 RX ADMIN — Medication 1 PATCH: at 12:00

## 2023-03-23 RX ADMIN — Medication 1 PATCH: at 06:10

## 2023-03-23 RX ADMIN — Medication 1 TABLET(S): at 12:00

## 2023-03-23 RX ADMIN — APIXABAN 5 MILLIGRAM(S): 2.5 TABLET, FILM COATED ORAL at 18:09

## 2023-03-23 RX ADMIN — APIXABAN 5 MILLIGRAM(S): 2.5 TABLET, FILM COATED ORAL at 06:12

## 2023-03-23 RX ADMIN — PANTOPRAZOLE SODIUM 40 MILLIGRAM(S): 20 TABLET, DELAYED RELEASE ORAL at 06:12

## 2023-03-23 RX ADMIN — Medication 1 PATCH: at 18:04

## 2023-03-23 RX ADMIN — Medication 1 PATCH: at 11:00

## 2023-03-23 RX ADMIN — ARIPIPRAZOLE 10 MILLIGRAM(S): 15 TABLET ORAL at 12:00

## 2023-03-23 NOTE — PROGRESS NOTE ADULT - REASON FOR ADMISSION
Hypothermia, AMS

## 2023-03-23 NOTE — PROGRESS NOTE ADULT - PROBLEM SELECTOR PLAN 4
Pt with BUN/Cr of 10/0.80 on 3/15 in afternoon with subsequent rise to 14/1.15 on 3/16. Pt was previously in distributive shock, was stabilized with Levophed ggt @ 0.05mcg/kg/min run and d/c @ 1500 on 3/15/23. Suspecting abrupt rise secondary to hypovolemia/hypoperfusion. Urine studies obtained 3/16 showing Urine sodium of 63 and UrCrea of 36. FeNa calculated to 1.4% suggestive of intrinsic etiology. Suspecting source to be from frequent episodes of hypoperfusion causing ischemic ATN. BUN/Cr improving 3/21.   Plan:   - Suspecting plateau of Cr followed by polyuria in ATN.   - Continue to trend CMPs daily. Patient refusing labs   - Avoid nephrotoxic agents

## 2023-03-23 NOTE — PROGRESS NOTE ADULT - PROBLEM SELECTOR PLAN 6
Pt reports hx of "gastric surgery for GI bleed" in 2017 which he believes was @[facility] but no documentation available. Reports previously taking Omeprazole but has not taken any Rx's in some time. Hb 10.9 on admission.  No evidence of GIB at this time, pt denies recent melena/hematochezia/hematemesis. Per note at [facility] in 2016, pt seen by Dr Robin Bland however no documentation found. On AM of 3/17, pt saying his GI surgery was at [facility] 2 years ago -- again no documentation found on [facility]. No objective findings of complications from GI surgery.   Plan:   - c/w Pantoprazole 40mg PO QD

## 2023-03-23 NOTE — PROGRESS NOTE ADULT - ATTENDING COMMENTS
Patient without complaints, feeling fine. BP/HR stable, normal temperature. No other complaints or events reported.    Exam unremarkable.  Refused labs    Follow-up with psychiatry, for safe discharge. SW following  Endocrinology follow-up if any further recommendations  Patient started on Apixaban for PE with good tolerance. Will continue on same, covered by insurance. Age appropriate screening with PCP on discharge.
Patient awake, alert, denies any complaints. BP/HR stable.   Exam unremarkable  Labs: refused    Appreciate psychiatry, patient not candidate for inpatient psychiatry admission. Refused NH and Shelter  Medically ready.  SW following
Patient with above PMH and hospital course,. Transferred from ICU to [facility]. Clinically improved, without complaints. VS stable  Labs, imaging, EKG and hospital course reviewed    Shock of unclear etiology  Hypothermia  Microcytic anemia  Acute PE    Patient off pressors with stable VS, ok to hold ATB, follow-up culture data. Send Cortisol level, TSH  Initial EKG with bradycardia and Mosher in setting of hypothermia, get repeat EKG now that patient euthermic  Probable hemoglobinopathies in setting of normal Iron panel  Incidental finding of PE with imaging started on Lovenox. Will need to see if any NOAC covered.   Age appropriate screening as outpatient
Patient without complaints, morning labs for Cortisol weren't drawn on time. Psychiatry following  Exam unremarkable  Labs reviewed  Appreciate Endocrinology  Psychiatry follow-up for clearance for discharge  Patient not interested in placement
#Hypothermia: now resolved; w/ marked bradycardia on admission; s/p rewarming protocol. No signs of sepsis. Bcx NGTD, tsh wnl. Possibly environmental?; continue to monitor, f/up repeat ekg    #PE: no hx of recent travel, ?sedentary lifestyle, no FH dvts. F/up LE dopplers, c/w lovenox for now.
Patient reports feeling at baseline, denies complaints. Per IDR, patient has been providing multiple histories to living situation.     Labs, imaging reviewed    AM cortisol noted, will need ACTH stimulation  SW following
Patient reports feeling fine, denies any complaints. Reports leg pain resolved, no edema. No other complaints or events reported.    General: AOX2-3, NAD, lying in bed, speaking in full sentences, no labored breathing on RA  HEENT: AT/NC, no facial asymmetry  Abdomen: soft, non-tender  Extremities:  warm, no edema, no tenderness to palpation, no calf tenderness, no focal deficit    Labs, imaging reviewed    Worsening transaminitis. follow-up hepatitis panel. Continue on trending. Hold Hepatotoxics  Appreciate psychiatry  Endocrinology consulted  SW follow-up for safe dispo
No sig change. No new events. Medically stable.   Pending ACTH stim test today.

## 2023-03-23 NOTE — PROGRESS NOTE ADULT - PROBLEM SELECTOR PROBLEM 11
----- Message from Ron Castellanos sent at 12/26/2017  3:58 PM EST -----  Regarding: Dr. Ching Zamorano stated they are in need of a new order for a different type of \"Gloucoste Meter\" other \"Freestyle Flash System\" it has been d/c. [employer] [phone]. Low body temperature

## 2023-03-23 NOTE — PROGRESS NOTE ADULT - SUBJECTIVE AND OBJECTIVE BOX
OVERNIGHT EVENTS: LIZA     SUBJECTIVE / INTERVAL HPI: Patient in bed without any concerns this AM, without any SOB or CP, refusing exam and refusing to answer further questions     VITAL SIGNS:  Vital Signs Last 24 Hrs  T(C): 36.8 (23 Mar 2023 12:28), Max: 37.1 (22 Mar 2023 20:36)  T(F): 98.3 (23 Mar 2023 12:28), Max: 98.7 (22 Mar 2023 20:36)  HR: 61 (23 Mar 2023 12:28) (54 - 61)  BP: 145/72 (23 Mar 2023 12:28) (130/77 - 145/72)  BP(mean): --  RR: 18 (23 Mar 2023 12:28) (17 - 18)  SpO2: 100% (23 Mar 2023 12:28) (99% - 100%)    Parameters below as of 23 Mar 2023 12:28  Patient On (Oxygen Delivery Method): room air        PHYSICAL EXAM: Refusing exam     MEDICATIONS:  MEDICATIONS  (STANDING):  apixaban 5 milliGRAM(s) Oral every 12 hours  ARIPiprazole 10 milliGRAM(s) Oral daily  influenza  Vaccine (HIGH DOSE) 0.7 milliLiter(s) IntraMuscular once  multivitamin 1 Tablet(s) Oral daily  nicotine - 21 mG/24Hr(s) Patch 1 Patch Transdermal daily  pantoprazole    Tablet 40 milliGRAM(s) Oral before breakfast    MEDICATIONS  (PRN):      ALLERGIES:  Allergies    No Known Allergies    Intolerances        LABS:        CAPILLARY BLOOD GLUCOSE      POCT Blood Glucose.: 97 mg/dL (23 Mar 2023 12:29)      RADIOLOGY & ADDITIONAL TESTS: Reviewed.

## 2023-03-23 NOTE — PROGRESS NOTE ADULT - PROBLEM SELECTOR PLAN 3
Initially minimally interactive only nodding head, now returned to baselined MS (A+Ox3) and does remember events leading up to 911 being called. CT head neg for trauma/acute changes. Likely etiology hypothermia. Currently mentating well, AOx3. Some degree/element of confusion assessed on physical examination 3/17 (see Hx of GI bleed). 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 Pt's previous nursing home contacted by SW who mentioned Mr. Mathur went out for an appointment on 2/16 and "ran away". He was previously a resident there for 2 years. Pt now s/p Psychiatry consult for noted psychosis. Pt noted to be hypocortisolemic on 3/17 to 5.4. ACTH stimulation test on 3/18 showing baseline ACTH of 62.6, with stim baseline of 4.7. 30 min after admin of Cortrosyn 0.25mg @ 10AM 3/18 showed rise to just 9.7. 1 hour later, stable at 9.7. 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 UTox negative.   Plan:   - Psych consulted, appreciate recs, pt currently receiving Abilify 10mg for psychosis  - No psychiatric contraindication to discharge per psych. However, patient refusing discharge   - monitor LFTs

## 2023-03-23 NOTE — PROGRESS NOTE ADULT - PROBLEM SELECTOR PLAN 5
Hgb 10.9 on admission , MCV 68.9. Some schistocytes present. Currently no signs of active bleeding (no hematochezia, melena, hemoptysis, hematuria). Iron studies showing Iron of 56, TIBC of 267, Ferritin of 33, and Iron % Saturation of 21. 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   Plan:   - Trend CBC  - Maintain active T&S (next due 3/19). Patient refusing labs   - transfuse if Hgb <7. Patient refusing labs

## 2023-03-23 NOTE — DISCHARGE NOTE NURSING/CASE MANAGEMENT/SOCIAL WORK - NSDCPEFALRISK_GEN_ALL_CORE
For information on Fall & Injury Prevention, visit: [email or web address] OR  [email or web address] OR  https://www.cdc.gov/steadi/patient.html

## 2023-03-23 NOTE — PROGRESS NOTE ADULT - PROBLEM SELECTOR PLAN 12
Event Note
Event Note
Prior Auth-Eliquis/Event Note
FLUIDS: None  ELECTROLYTES: Mg<2, K<4  DIET: DASH  VTE PPX: Lovenox (treatment dose for PE)  CODE: FULL  DISPO: MICU > RMF
FLUIDS: None  ELECTROLYTES: Mg<2, K<4  DIET: DASH  VTE PPX: Lovenox (treatment dose for PE)  CODE: FULL  DISPO: MICU > RMF
FLUIDS: None  ELECTROLYTES: Mg<2, K<4  DIET: DASH  VTE PPX: Lovenox (treatment dose for PE)  CODE: FULL  DISPO: RMGIULIANO
FLUIDS: None  ELECTROLYTES: Mg<2, K<4  DIET: DASH  VTE PPX: Lovenox (treatment dose for PE)  CODE: FULL  DISPO: MICU > RMF

## 2023-03-23 NOTE — DISCHARGE NOTE NURSING/CASE MANAGEMENT/SOCIAL WORK - PATIENT PORTAL LINK FT
You can access the FollowMyHealth Patient Portal offered by [facility] by registering at the following website: [email or web address]. By joining [employer]’s FollowMyHealth portal, you will also be able to view your health information using other applications (apps) compatible with our system.

## 2023-03-23 NOTE — PROGRESS NOTE ADULT - PROBLEM SELECTOR PLAN 1
Suspect possible DILI 2/2 abilify (<1% incidence). No previous hepatic pathology on previous imaging. Plan for repeat abd u/s but may need to address therapeutic change with behavioral health. RUQ US on 3/19 showing normal liver morphology and smooth surfaces. Increased echogenicity and attenuation of the sound beam posteriorly suggestive of steatosis. No focal lesion. Main portal vein is patent, with hepatopedal flow.  Likely 2/2 diastolic dysfunction seen on TTE   Plan:   - Hepatitis panel was ordered for AM 3/22, however pt refusing labs    - Continue to monitor for now

## 2023-03-23 NOTE — DISCHARGE NOTE NURSING/CASE MANAGEMENT/SOCIAL WORK - FLU SEASON?
[facility] ED  eMERGENCY dEPARTMENT eNCOUnter      Pt Name: Gogo Betancourt  MRN: [id number]  [place] 2012  Date of evaluation: 12/4/2017  Provider: Rachell Monique PA-C    CHIEF COMPLAINT       Chief Complaint   Patient presents with    Leg Swelling     left knee pt will not bear weight, no known injury       HISTORY OF PRESENT ILLNESS    Gogo Betancourt is a 11 y.o. male who presents to the emergency department from home Complaints of left knee pain. Mom states that he was complaining of his knee hurting this morning. She states that the school called later in the day and stated that he was not wanting to put weight on it. Mom said it seems swollen. She gave him some Tylenol. She is uncertain of any specific injury. She does state he could've injured it without her knowing. Mom denies any fevers or chills. Triage notes and Nursing notes were reviewed by myself. Any discrepancies are addressed above. PAST MEDICAL HISTORY   History reviewed. No pertinent past medical history. SURGICAL HISTORY     History reviewed. No pertinent surgical history. CURRENT MEDICATIONS       Previous Medications    ACETAMINOPHEN (TYLENOL) 160 MG/5ML LIQUID    Take 15 mg/kg by mouth every 4 hours as needed for Fever       ALLERGIES     Review of patient's allergies indicates no known allergies. FAMILY HISTORY     History reviewed. No pertinent family history. SOCIAL HISTORY       Social History     Social History    Marital status: Single     Spouse name: N/A    Number of children: N/A    Years of education: N/A     Social History Main Topics    Smoking status: Passive Smoke Exposure - Never Smoker    Smokeless tobacco: Never Used    Alcohol use None    Drug use: Unknown    Sexual activity: Not Asked     Other Topics Concern    None     Social History Narrative    None       REVIEW OF SYSTEMS       Review of Systems   Constitutional: Negative for chills, fatigue and fever.    HENT: Negative Yes... was very diffuse. He had no pain over the right knee or the right or left hip. His lower extremity sensation was intact. Pedal pulse 2/4 bilaterally. Neurological: He is alert. Skin: Skin is warm. No rash noted. Nursing note and vitals reviewed. DIAGNOSTIC RESULTS     EKG: (none if blank)  All EKG's are interpreted by the Emergency Department Physician who either signs or Co-signs this chart in the absence of a cardiologist.        RADIOLOGY: (none if blank)   Interpretation per the Radiologist below, if available at the time of this note:    XR PELVIS (1-2 VW)   Final Result   Impression:     Normal study of the pelvis. No evidence of fracture or dislocation. Electronically Signed By: Jair Arias   on  12/04/2017  20:02      XR KNEE LEFT (3 VIEWS)   Final Result   Impression:     Normal study of the knee       Negative for fracture          Electronically Signed By: Jair Arias   on  12/04/2017  18:36          LABS:  Labs Reviewed - No data to display    All other labs were within normal range or not returned as of this dictation. EMERGENCY DEPARTMENT COURSE and Medical Decision Making:     MDM/  ED Course      I informed the mother results. She was discharged home. Patient had no fever. The joint was not red or warm. Strict return precautions and follow up instructions were discussed with the patient's mother with which the patient's mother agrees    CONSULTS: (None if blank)  None    Procedures: (None if blank)       CLINICAL IMPRESSION      1.  Acute pain of left knee          DISPOSITION/PLAN   DISPOSITION Decision to Discharge    PATIENT REFERRED TO:  Marcelina Allen CNP  [address] [place] [address]  [phone]    In 3 days        DISCHARGE MEDICATIONS:  New Prescriptions    No medications on file              (Please note that portions of this note were completed with a voice recognition program.  Efforts were made to edit the dictations but occasionally words are

## 2023-03-23 NOTE — PROGRESS NOTE ADULT - ASSESSMENT
73 y/o undomiciled Male current smoker (1ppd x50+yrs), w/ PSHx of unspecified GI surgery reportedly for GIB, and PMHx of HTN who was initially BIBEMS for hypotension and hypothermia, admitted to MICU and stabilized, and evaluated on RMF for hypoadrenalism and psychosis.

## 2023-03-23 NOTE — PROGRESS NOTE ADULT - PROVIDER SPECIALTY LIST ADULT
Internal Medicine
Internal Medicine
MICU
Internal Medicine
Hospitalist

## 2023-03-24 VITALS
DIASTOLIC BLOOD PRESSURE: 75 MMHG | HEART RATE: 59 BPM | RESPIRATION RATE: 17 BRPM | TEMPERATURE: 99 F | SYSTOLIC BLOOD PRESSURE: 133 MMHG | OXYGEN SATURATION: 99 %

## 2023-03-24 LAB
GLUCOSE BLDC GLUCOMTR-MCNC: 83 MG/DL — SIGNIFICANT CHANGE UP (ref 70–99)
GLUCOSE BLDC GLUCOMTR-MCNC: 85 MG/DL — SIGNIFICANT CHANGE UP (ref 70–99)
GLUCOSE BLDC GLUCOMTR-MCNC: 94 MG/DL — SIGNIFICANT CHANGE UP (ref 70–99)

## 2023-03-24 PROCEDURE — 87640 STAPH A DNA AMP PROBE: CPT

## 2023-03-24 PROCEDURE — 99238 HOSP IP/OBS DSCHRG MGMT 30/<: CPT | Mod: GC

## 2023-03-24 PROCEDURE — 76770 US EXAM ABDO BACK WALL COMP: CPT

## 2023-03-24 PROCEDURE — 70450 CT HEAD/BRAIN W/O DYE: CPT

## 2023-03-24 PROCEDURE — 82803 BLOOD GASES ANY COMBINATION: CPT

## 2023-03-24 PROCEDURE — 85025 COMPLETE CBC W/AUTO DIFF WBC: CPT

## 2023-03-24 PROCEDURE — 71260 CT THORAX DX C+: CPT | Mod: MG

## 2023-03-24 PROCEDURE — 71045 X-RAY EXAM CHEST 1 VIEW: CPT

## 2023-03-24 PROCEDURE — 74177 CT ABD & PELVIS W/CONTRAST: CPT | Mod: MG

## 2023-03-24 PROCEDURE — 86850 RBC ANTIBODY SCREEN: CPT

## 2023-03-24 PROCEDURE — 83930 ASSAY OF BLOOD OSMOLALITY: CPT

## 2023-03-24 PROCEDURE — 93005 ELECTROCARDIOGRAM TRACING: CPT

## 2023-03-24 PROCEDURE — 84133 ASSAY OF URINE POTASSIUM: CPT

## 2023-03-24 PROCEDURE — 87449 NOS EACH ORGANISM AG IA: CPT

## 2023-03-24 PROCEDURE — 80307 DRUG TEST PRSMV CHEM ANLYZR: CPT

## 2023-03-24 PROCEDURE — 82962 GLUCOSE BLOOD TEST: CPT

## 2023-03-24 PROCEDURE — 83540 ASSAY OF IRON: CPT

## 2023-03-24 PROCEDURE — 72125 CT NECK SPINE W/O DYE: CPT

## 2023-03-24 PROCEDURE — 87040 BLOOD CULTURE FOR BACTERIA: CPT

## 2023-03-24 PROCEDURE — 82533 TOTAL CORTISOL: CPT

## 2023-03-24 PROCEDURE — 0225U NFCT DS DNA&RNA 21 SARSCOV2: CPT

## 2023-03-24 PROCEDURE — 36415 COLL VENOUS BLD VENIPUNCTURE: CPT

## 2023-03-24 PROCEDURE — 83550 IRON BINDING TEST: CPT

## 2023-03-24 PROCEDURE — 86900 BLOOD TYPING SEROLOGIC ABO: CPT

## 2023-03-24 PROCEDURE — 93970 EXTREMITY STUDY: CPT

## 2023-03-24 PROCEDURE — 80048 BASIC METABOLIC PNL TOTAL CA: CPT

## 2023-03-24 PROCEDURE — 82728 ASSAY OF FERRITIN: CPT

## 2023-03-24 PROCEDURE — 93306 TTE W/DOPPLER COMPLETE: CPT

## 2023-03-24 PROCEDURE — 82024 ASSAY OF ACTH: CPT

## 2023-03-24 PROCEDURE — 83935 ASSAY OF URINE OSMOLALITY: CPT

## 2023-03-24 PROCEDURE — 84295 ASSAY OF SERUM SODIUM: CPT

## 2023-03-24 PROCEDURE — 86901 BLOOD TYPING SEROLOGIC RH(D): CPT

## 2023-03-24 PROCEDURE — 84100 ASSAY OF PHOSPHORUS: CPT

## 2023-03-24 PROCEDURE — 84540 ASSAY OF URINE/UREA-N: CPT

## 2023-03-24 PROCEDURE — 87641 MR-STAPH DNA AMP PROBE: CPT

## 2023-03-24 PROCEDURE — 84443 ASSAY THYROID STIM HORMONE: CPT

## 2023-03-24 PROCEDURE — 84156 ASSAY OF PROTEIN URINE: CPT

## 2023-03-24 PROCEDURE — 83690 ASSAY OF LIPASE: CPT

## 2023-03-24 PROCEDURE — 80076 HEPATIC FUNCTION PANEL: CPT

## 2023-03-24 PROCEDURE — 84439 ASSAY OF FREE THYROXINE: CPT

## 2023-03-24 PROCEDURE — 87637 SARSCOV2&INF A&B&RSV AMP PRB: CPT

## 2023-03-24 PROCEDURE — 83605 ASSAY OF LACTIC ACID: CPT

## 2023-03-24 PROCEDURE — 99291 CRITICAL CARE FIRST HOUR: CPT

## 2023-03-24 PROCEDURE — 76705 ECHO EXAM OF ABDOMEN: CPT

## 2023-03-24 PROCEDURE — 86803 HEPATITIS C AB TEST: CPT

## 2023-03-24 PROCEDURE — 85730 THROMBOPLASTIN TIME PARTIAL: CPT

## 2023-03-24 PROCEDURE — 83036 HEMOGLOBIN GLYCOSYLATED A1C: CPT

## 2023-03-24 PROCEDURE — 81003 URINALYSIS AUTO W/O SCOPE: CPT

## 2023-03-24 PROCEDURE — 84300 ASSAY OF URINE SODIUM: CPT

## 2023-03-24 PROCEDURE — 96374 THER/PROPH/DIAG INJ IV PUSH: CPT

## 2023-03-24 PROCEDURE — 87899 AGENT NOS ASSAY W/OPTIC: CPT

## 2023-03-24 PROCEDURE — 82330 ASSAY OF CALCIUM: CPT

## 2023-03-24 PROCEDURE — 85027 COMPLETE CBC AUTOMATED: CPT

## 2023-03-24 PROCEDURE — 84132 ASSAY OF SERUM POTASSIUM: CPT

## 2023-03-24 PROCEDURE — 82570 ASSAY OF URINE CREATININE: CPT

## 2023-03-24 PROCEDURE — G1004: CPT

## 2023-03-24 PROCEDURE — 80053 COMPREHEN METABOLIC PANEL: CPT

## 2023-03-24 PROCEDURE — 83735 ASSAY OF MAGNESIUM: CPT

## 2023-03-24 PROCEDURE — 85610 PROTHROMBIN TIME: CPT

## 2023-03-24 RX ADMIN — Medication 1 PATCH: at 12:48

## 2023-03-24 RX ADMIN — PANTOPRAZOLE SODIUM 40 MILLIGRAM(S): 20 TABLET, DELAYED RELEASE ORAL at 06:46

## 2023-03-24 RX ADMIN — Medication 1 PATCH: at 08:25

## 2023-03-24 RX ADMIN — Medication 1 PATCH: at 15:00

## 2023-03-24 RX ADMIN — APIXABAN 5 MILLIGRAM(S): 2.5 TABLET, FILM COATED ORAL at 06:46

## 2023-03-24 RX ADMIN — Medication 1 PATCH: at 12:43

## 2023-03-24 RX ADMIN — Medication 1 TABLET(S): at 12:44

## 2023-03-24 RX ADMIN — ARIPIPRAZOLE 10 MILLIGRAM(S): 15 TABLET ORAL at 12:43

## 2023-03-26 PROBLEM — F17.200 NICOTINE DEPENDENCE, UNSPECIFIED, UNCOMPLICATED: Chronic | Status: ACTIVE | Noted: 2023-03-15

## 2023-03-26 PROBLEM — K92.2 GASTROINTESTINAL HEMORRHAGE, UNSPECIFIED: Chronic | Status: ACTIVE | Noted: 2023-03-15

## 2023-03-26 LAB
CORTICOSTEROID BINDING GLOBULIN RESULT: 1.1 MG/DL — LOW
CORTIS F/TOTAL MFR SERPL: 28 % — SIGNIFICANT CHANGE UP
CORTIS SERPL-MCNC: 9.2 UG/DL — SIGNIFICANT CHANGE UP
CORTISOL, FREE RESULT: 2.6 UG/DL — HIGH

## 2023-03-27 ENCOUNTER — NON-APPOINTMENT (OUTPATIENT)
Age: 73
End: 2023-03-27
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
## 2023-03-28 DIAGNOSIS — D50.9 IRON DEFICIENCY ANEMIA, UNSPECIFIED: ICD-10-CM

## 2023-03-28 DIAGNOSIS — T68.XXXA HYPOTHERMIA, INITIAL ENCOUNTER: ICD-10-CM

## 2023-03-28 DIAGNOSIS — N17.0 ACUTE KIDNEY FAILURE WITH TUBULAR NECROSIS: ICD-10-CM

## 2023-03-28 DIAGNOSIS — R57.9 SHOCK, UNSPECIFIED: ICD-10-CM
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
## 2023-03-28 DIAGNOSIS — F17.200 NICOTINE DEPENDENCE, UNSPECIFIED, UNCOMPLICATED: ICD-10-CM

## 2023-03-28 DIAGNOSIS — Z59.00 HOMELESSNESS UNSPECIFIED: ICD-10-CM

## 2023-03-28 DIAGNOSIS — I26.93 SINGLE SUBSEGMENTAL PULMONARY EMBOLISM WITHOUT ACUTE COR PULMONALE: ICD-10-CM

## 2023-03-28 DIAGNOSIS — F29 UNSPECIFIED PSYCHOSIS NOT DUE TO A SUBSTANCE OR KNOWN PHYSIOLOGICAL CONDITION: ICD-10-CM

## 2023-03-28 DIAGNOSIS — R74.01 ELEVATION OF LEVELS OF LIVER TRANSAMINASE LEVELS: ICD-10-CM

## 2023-03-28 DIAGNOSIS — I26.99 OTHER PULMONARY EMBOLISM WITHOUT ACUTE COR PULMONALE: ICD-10-CM

## 2023-03-28 DIAGNOSIS — I10 ESSENTIAL (PRIMARY) HYPERTENSION: ICD-10-CM

## 2023-03-28 DIAGNOSIS — Z20.822 CONTACT WITH AND (SUSPECTED) EXPOSURE TO COVID-19: ICD-10-CM

## 2023-03-28 DIAGNOSIS — R73.03 PREDIABETES: ICD-10-CM

## 2023-03-28 DIAGNOSIS — G93.49 OTHER ENCEPHALOPATHY: ICD-10-CM

## 2023-03-28 SDOH — ECONOMIC STABILITY - HOUSING INSECURITY: HOMELESSNESS UNSPECIFIED: Z59.00

## 2023-03-30 ENCOUNTER — APPOINTMENT (OUTPATIENT)
Dept: INTERNAL MEDICINE | Facility: CLINIC | Age: 73
End: 2023-03-30

## 2023-03-31 ENCOUNTER — EMERGENCY (EMERGENCY)
Facility: HOSPITAL | Age: 73
LOS: 1 days | Discharge: HOME | End: 2023-03-31
Attending: STUDENT IN AN ORGANIZED HEALTH CARE EDUCATION/TRAINING PROGRAM | Admitting: STUDENT IN AN ORGANIZED HEALTH CARE EDUCATION/TRAINING PROGRAM
Payer: MEDICAID

## 2023-03-31 VITALS
HEART RATE: 74 BPM | OXYGEN SATURATION: 100 % | TEMPERATURE: 98 F | RESPIRATION RATE: 18 BRPM | SYSTOLIC BLOOD PRESSURE: 145 MMHG | DIASTOLIC BLOOD PRESSURE: 75 MMHG

## 2023-03-31 VITALS
OXYGEN SATURATION: 100 % | HEART RATE: 86 BPM | SYSTOLIC BLOOD PRESSURE: 153 MMHG | TEMPERATURE: 98 F | DIASTOLIC BLOOD PRESSURE: 69 MMHG | RESPIRATION RATE: 18 BRPM

## 2023-03-31 DIAGNOSIS — Z98.890 OTHER SPECIFIED POSTPROCEDURAL STATES: Chronic | ICD-10-CM

## 2023-03-31 PROCEDURE — 73552 X-RAY EXAM OF FEMUR 2/>: CPT

## 2023-03-31 PROCEDURE — 73552 X-RAY EXAM OF FEMUR 2/>: CPT | Mod: 26,RT

## 2023-03-31 PROCEDURE — 73502 X-RAY EXAM HIP UNI 2-3 VIEWS: CPT

## 2023-03-31 PROCEDURE — 99284 EMERGENCY DEPT VISIT MOD MDM: CPT | Mod: 25

## 2023-03-31 PROCEDURE — 73560 X-RAY EXAM OF KNEE 1 OR 2: CPT | Mod: 26,RT

## 2023-03-31 PROCEDURE — 96372 THER/PROPH/DIAG INJ SC/IM: CPT

## 2023-03-31 PROCEDURE — 73560 X-RAY EXAM OF KNEE 1 OR 2: CPT

## 2023-03-31 PROCEDURE — 99284 EMERGENCY DEPT VISIT MOD MDM: CPT

## 2023-03-31 PROCEDURE — 73502 X-RAY EXAM HIP UNI 2-3 VIEWS: CPT | Mod: 26,RT

## 2023-03-31 RX ORDER — KETOROLAC TROMETHAMINE 30 MG/ML
15 SYRINGE (ML) INJECTION ONCE
Refills: 0 | Status: DISCONTINUED | OUTPATIENT
Start: 2023-03-31 | End: 2023-03-31

## 2023-03-31 RX ADMIN — Medication 15 MILLIGRAM(S): at 07:54

## 2023-03-31 NOTE — ED PROVIDER NOTE - CLINICAL SUMMARY MEDICAL DECISION MAKING FREE TEXT BOX
Limited history, pt overall appears comfortable, indicating pain to the medial R thigh. Will get xrays and give toradol.   -->on reassessment pt says pain has improved, able to bear weight and ambulate to bathroom with walker, will dc with walker Limited history, pt overall appears comfortable, indicating pain to the medial R thigh. Will get xrays and give toradol.   -->on reassessment pt says pain has improved, able to bear weight and ambulate to bathroom, will dc  -->Pt got upset and says he does not want to leave and wants his room back upstairs from his admission. Insists his leg is broken, I explained that his xrays did not show fracture, pt says he wants his room back upstairs and if we wont give it to him he will ask them upstairs. I explained pt cannot go upstairs to the medical floors at this time. I spoke with radiology resident who said there is osteoarthritis but no fracture. Continue with plan for discharge.

## 2023-03-31 NOTE — ED PROVIDER NOTE - PROGRESS NOTE DETAILS
Noted to be mildly tremulous, denies alcohol use, says he smokes cigarettes. Per recent admission notes, no mention of alcohol use disorder. Pt says he is shivering bc he is cold. Warm blanket given. Noted to be mildly tremulous, denies alcohol use, says he smokes cigarettes. Per recent admission notes, no mention of alcohol use disorder. Pt says he is shivering bc he is cold. Warm blanket given. Then trembling/shivering stopped.

## 2023-03-31 NOTE — ED PROVIDER NOTE - PATIENT PORTAL LINK FT
You can access the FollowMyHealth Patient Portal offered by [facility] by registering at the following website: [email or web address]. By joining [employer]’s FollowMyHealth portal, you will also be able to view your health information using other applications (apps) compatible with our system.

## 2023-03-31 NOTE — ED PROVIDER NOTE - OBJECTIVE STATEMENT
72yM w/ HTN, ?schizophrenia, recently admitted here for AMS/sepsis from 3/15-3/23, had pan-scan that found RLL segmental PE but not started on coumadin due to medication non-compliance, was given eliquis. Comes in complaining of R groin pain per triage note, but when i talk to him he says it is pain to the inner R thigh. Says it feels like there is prosthetic flesh??? Says it feels like "a breakdown" but unable to describe further. No scrotum or testicular pain. Pt is a very difficult historian. Denies back pain. 72yM w/ HTN, ?schizophrenia, recently admitted here for AMS/sepsis from 3/15-3/23, had pan-scan that found RLL segmental PE but not started on coumadin due to medication non-compliance, was given Eliquis but unclear if he is compliant. Comes in complaining of R groin pain per triage note, but when i talk to him he says it is pain to the inner R thigh. Says it feels like there is prosthetic flesh??? Says it feels like "a breakdown" but unable to describe further. No scrotum or testicular pain. Pt is a very difficult historian. Denies back pain. 72yM w/ undomiciled, HTN, ?schizophrenia, recently admitted here for AMS/sepsis from 3/15-3/23, had pan-scan that found RLL segmental PE but not started on coumadin due to medication non-compliance, was given Eliquis which he says he is compliant with. Comes in complaining of R groin pain per triage note, but when i talk to him he says it is pain to the inner R thigh. Says it feels like there is prosthetic flesh??? Says it feels like "a breakdown" but unable to describe further. No scrotum or testicular pain. Pt is a very difficult historian. Denies back pain.

## 2023-03-31 NOTE — ED ADULT NURSE REASSESSMENT NOTE - NS ED NURSE REASSESS COMMENT FT1
Report received from LUIS Rouse. Pt AOx4. Pt on bed pan in stretcher. Pt had a bowel movement and was cleaned by RN. Skin intact. Pt co RLE pain. Pending X-ray
pt provided with a walker and assisted to the bathroom. pt ambulating with steady gait. MD Infante made aware.
pt provided with walker at d/c. teaching on proper use provided. verbalized understating, returned demonstration performed.

## 2023-03-31 NOTE — ED PROVIDER NOTE - PHYSICAL EXAMINATION
CONST: nontoxic NAD speaking in full sentences mumbling hard to understand, needs frequent prompting to answer question  HEAD: atraumatic  EYES: EOMI  ENT: mmm  NECK: supple  CARD: rrr   CHEST: no stridor/retractions/tripoding  ABD: soft, nd, nttp  PELVIS: stable, nontender to b/l hips  : Grossly normal penis/scrotum, no tenderness  EXT: moving all extr x4, able to bend and extend b/l LE, sensation intact  SKIN: warm, dry, no rash  NEURO: awake alert and oriented, answering questions following commands moving all extremities CONST: nontoxic NAD speaking in full sentences mumbling hard to understand, needs frequent prompting to answer question  HEAD: atraumatic  EYES: EOMI  ENT: mmm  NECK: supple  CARD: rrr   CHEST: no stridor/retractions/tripoding  ABD: soft, nd, nttp  PELVIS: stable, nontender to b/l hips  : Grossly normal penis/scrotum, no tenderness  EXT: moving all extr x4, able to bend and extend b/l hips/knees, sensation intact  SKIN: warm, dry, no rash  NEURO: awake alert and oriented, answering questions following commands moving all extremities

## 2023-03-31 NOTE — ED ADULT TRIAGE NOTE - ARRIVAL INFO ADDITIONAL COMMENTS
pt c/o right sided groin pain,   pt discharge 6 days ago from [facility] after admission for hypotension, hypothermia and psychosis.

## 2023-04-03 DIAGNOSIS — Z79.01 LONG TERM (CURRENT) USE OF ANTICOAGULANTS: ICD-10-CM

## 2023-04-03 DIAGNOSIS — Z87.19 PERSONAL HISTORY OF OTHER DISEASES OF THE DIGESTIVE SYSTEM: ICD-10-CM

## 2023-04-03 DIAGNOSIS — I10 ESSENTIAL (PRIMARY) HYPERTENSION: ICD-10-CM

## 2023-04-03 DIAGNOSIS — M79.651 PAIN IN RIGHT THIGH: ICD-10-CM

## 2023-04-03 DIAGNOSIS — Z91.14 PATIENT'S OTHER NONCOMPLIANCE WITH MEDICATION REGIMEN: ICD-10-CM

## 2023-04-03 DIAGNOSIS — R25.1 TREMOR, UNSPECIFIED: ICD-10-CM

## 2023-04-03 DIAGNOSIS — Z86.19 PERSONAL HISTORY OF OTHER INFECTIOUS AND PARASITIC DISEASES: ICD-10-CM

## 2023-04-03 DIAGNOSIS — Z59.00 HOMELESSNESS UNSPECIFIED: ICD-10-CM

## 2023-04-03 DIAGNOSIS — F17.210 NICOTINE DEPENDENCE, CIGARETTES, UNCOMPLICATED: ICD-10-CM

## 2023-04-03 DIAGNOSIS — Z86.711 PERSONAL HISTORY OF PULMONARY EMBOLISM: ICD-10-CM

## 2023-04-03 SDOH — ECONOMIC STABILITY - HOUSING INSECURITY: HOMELESSNESS UNSPECIFIED: Z59.00

## 2023-04-12 VITALS
DIASTOLIC BLOOD PRESSURE: 69 MMHG | TEMPERATURE: 98 F | HEART RATE: 81 BPM | RESPIRATION RATE: 18 BRPM | OXYGEN SATURATION: 100 % | SYSTOLIC BLOOD PRESSURE: 133 MMHG

## 2023-04-12 PROCEDURE — 99285 EMERGENCY DEPT VISIT HI MDM: CPT

## 2023-04-13 ENCOUNTER — TRANSCRIPTION ENCOUNTER (OUTPATIENT)
Age: 73
End: 2023-04-13

## 2023-04-13 ENCOUNTER — INPATIENT (INPATIENT)
Facility: HOSPITAL | Age: 73
LOS: 4 days | Discharge: NURSING, REHAB OR LONG-TERM CARE | DRG: 481 | End: 2023-04-18
Attending: STUDENT IN AN ORGANIZED HEALTH CARE EDUCATION/TRAINING PROGRAM | Admitting: INTERNAL MEDICINE
Payer: MEDICAID

## 2023-04-13 DIAGNOSIS — R63.8 OTHER SYMPTOMS AND SIGNS CONCERNING FOOD AND FLUID INTAKE: ICD-10-CM

## 2023-04-13 DIAGNOSIS — Z86.711 PERSONAL HISTORY OF PULMONARY EMBOLISM: ICD-10-CM

## 2023-04-13 DIAGNOSIS — R09.89 OTHER SPECIFIED SYMPTOMS AND SIGNS INVOLVING THE CIRCULATORY AND RESPIRATORY SYSTEMS: ICD-10-CM

## 2023-04-13 DIAGNOSIS — Z01.818 ENCOUNTER FOR OTHER PREPROCEDURAL EXAMINATION: ICD-10-CM

## 2023-04-13 DIAGNOSIS — S72.001A FRACTURE OF UNSPECIFIED PART OF NECK OF RIGHT FEMUR, INITIAL ENCOUNTER FOR CLOSED FRACTURE: ICD-10-CM

## 2023-04-13 DIAGNOSIS — Z98.890 OTHER SPECIFIED POSTPROCEDURAL STATES: Chronic | ICD-10-CM

## 2023-04-13 DIAGNOSIS — N17.9 ACUTE KIDNEY FAILURE, UNSPECIFIED: ICD-10-CM

## 2023-04-13 LAB
ANION GAP SERPL CALC-SCNC: 16 MMOL/L — SIGNIFICANT CHANGE UP (ref 5–17)
APPEARANCE UR: CLEAR — SIGNIFICANT CHANGE UP
APTT BLD: 39.4 SEC — HIGH (ref 27.5–35.5)
APTT BLD: 47.4 SEC — HIGH (ref 27.5–35.5)
BACTERIA # UR AUTO: PRESENT /HPF
BASOPHILS # BLD AUTO: 0.05 K/UL — SIGNIFICANT CHANGE UP (ref 0–0.2)
BASOPHILS NFR BLD AUTO: 1.2 % — SIGNIFICANT CHANGE UP (ref 0–2)
BILIRUB UR-MCNC: ABNORMAL
BLD GP AB SCN SERPL QL: NEGATIVE — SIGNIFICANT CHANGE UP
BUN SERPL-MCNC: 34 MG/DL — HIGH (ref 7–23)
CALCIUM SERPL-MCNC: 9.8 MG/DL — SIGNIFICANT CHANGE UP (ref 8.4–10.5)
CHLORIDE SERPL-SCNC: 104 MMOL/L — SIGNIFICANT CHANGE UP (ref 96–108)
CO2 SERPL-SCNC: 22 MMOL/L — SIGNIFICANT CHANGE UP (ref 22–31)
COLOR SPEC: YELLOW — SIGNIFICANT CHANGE UP
CREAT SERPL-MCNC: 1.42 MG/DL — HIGH (ref 0.5–1.3)
DIFF PNL FLD: NEGATIVE — SIGNIFICANT CHANGE UP
EGFR: 52 ML/MIN/1.73M2 — LOW
EOSINOPHIL # BLD AUTO: 0.05 K/UL — SIGNIFICANT CHANGE UP (ref 0–0.5)
EOSINOPHIL NFR BLD AUTO: 1.2 % — SIGNIFICANT CHANGE UP (ref 0–6)
EPI CELLS # UR: SIGNIFICANT CHANGE UP /HPF (ref 0–5)
GLUCOSE SERPL-MCNC: 59 MG/DL — LOW (ref 70–99)
GLUCOSE UR QL: NEGATIVE — SIGNIFICANT CHANGE UP
HCT VFR BLD CALC: 29.5 % — LOW (ref 39–50)
HGB BLD-MCNC: 8.9 G/DL — LOW (ref 13–17)
IMM GRANULOCYTES NFR BLD AUTO: 0.2 % — SIGNIFICANT CHANGE UP (ref 0–0.9)
INR BLD: 1.08 — SIGNIFICANT CHANGE UP (ref 0.88–1.16)
KETONES UR-MCNC: ABNORMAL MG/DL
LEUKOCYTE ESTERASE UR-ACNC: ABNORMAL
LYMPHOCYTES # BLD AUTO: 1.06 K/UL — SIGNIFICANT CHANGE UP (ref 1–3.3)
LYMPHOCYTES # BLD AUTO: 25.4 % — SIGNIFICANT CHANGE UP (ref 13–44)
MCHC RBC-ENTMCNC: 20.2 PG — LOW (ref 27–34)
MCHC RBC-ENTMCNC: 30.2 GM/DL — LOW (ref 32–36)
MCV RBC AUTO: 67 FL — LOW (ref 80–100)
MONOCYTES # BLD AUTO: 0.54 K/UL — SIGNIFICANT CHANGE UP (ref 0–0.9)
MONOCYTES NFR BLD AUTO: 12.9 % — SIGNIFICANT CHANGE UP (ref 2–14)
NEUTROPHILS # BLD AUTO: 2.47 K/UL — SIGNIFICANT CHANGE UP (ref 1.8–7.4)
NEUTROPHILS NFR BLD AUTO: 59.1 % — SIGNIFICANT CHANGE UP (ref 43–77)
NITRITE UR-MCNC: NEGATIVE — SIGNIFICANT CHANGE UP
NRBC # BLD: 0 /100 WBCS — SIGNIFICANT CHANGE UP (ref 0–0)
PCP SPEC-MCNC: SIGNIFICANT CHANGE UP
PH UR: 5.5 — SIGNIFICANT CHANGE UP (ref 5–8)
PLATELET # BLD AUTO: 364 K/UL — SIGNIFICANT CHANGE UP (ref 150–400)
POTASSIUM SERPL-MCNC: 4.6 MMOL/L — SIGNIFICANT CHANGE UP (ref 3.5–5.3)
POTASSIUM SERPL-SCNC: 4.6 MMOL/L — SIGNIFICANT CHANGE UP (ref 3.5–5.3)
PROT UR-MCNC: NEGATIVE MG/DL — SIGNIFICANT CHANGE UP
PROTHROM AB SERPL-ACNC: 12.9 SEC — SIGNIFICANT CHANGE UP (ref 10.5–13.4)
RBC # BLD: 4.4 M/UL — SIGNIFICANT CHANGE UP (ref 4.2–5.8)
RBC # FLD: 16.3 % — HIGH (ref 10.3–14.5)
RBC CASTS # UR COMP ASSIST: < 5 /HPF — SIGNIFICANT CHANGE UP
RH IG SCN BLD-IMP: NEGATIVE — SIGNIFICANT CHANGE UP
SARS-COV-2 RNA SPEC QL NAA+PROBE: NEGATIVE — SIGNIFICANT CHANGE UP
SODIUM SERPL-SCNC: 142 MMOL/L — SIGNIFICANT CHANGE UP (ref 135–145)
SP GR SPEC: >=1.03 — SIGNIFICANT CHANGE UP (ref 1–1.03)
UROBILINOGEN FLD QL: 0.2 E.U./DL — SIGNIFICANT CHANGE UP
WBC # BLD: 4.18 K/UL — SIGNIFICANT CHANGE UP (ref 3.8–10.5)
WBC # FLD AUTO: 4.18 K/UL — SIGNIFICANT CHANGE UP (ref 3.8–10.5)
WBC UR QL: < 5 /HPF — SIGNIFICANT CHANGE UP

## 2023-04-13 PROCEDURE — 73700 CT LOWER EXTREMITY W/O DYE: CPT | Mod: 26,RT,MA

## 2023-04-13 PROCEDURE — 73552 X-RAY EXAM OF FEMUR 2/>: CPT | Mod: 26,RT

## 2023-04-13 PROCEDURE — 73502 X-RAY EXAM HIP UNI 2-3 VIEWS: CPT | Mod: 26,RT

## 2023-04-13 PROCEDURE — 71045 X-RAY EXAM CHEST 1 VIEW: CPT | Mod: 26

## 2023-04-13 PROCEDURE — 93970 EXTREMITY STUDY: CPT | Mod: 26

## 2023-04-13 RX ORDER — POVIDONE-IODINE 5 %
1 AEROSOL (ML) TOPICAL ONCE
Refills: 0 | Status: COMPLETED | OUTPATIENT
Start: 2023-04-13 | End: 2023-04-13

## 2023-04-13 RX ORDER — PANTOPRAZOLE SODIUM 20 MG/1
40 TABLET, DELAYED RELEASE ORAL
Refills: 0 | Status: DISCONTINUED | OUTPATIENT
Start: 2023-04-13 | End: 2023-04-14

## 2023-04-13 RX ORDER — HEPARIN SODIUM 5000 [USP'U]/ML
1400 INJECTION INTRAVENOUS; SUBCUTANEOUS
Qty: 25000 | Refills: 0 | Status: DISCONTINUED | OUTPATIENT
Start: 2023-04-13 | End: 2023-04-14

## 2023-04-13 RX ORDER — NICOTINE POLACRILEX 2 MG
1 GUM BUCCAL DAILY
Refills: 0 | Status: DISCONTINUED | OUTPATIENT
Start: 2023-04-13 | End: 2023-04-14

## 2023-04-13 RX ORDER — ARIPIPRAZOLE 15 MG/1
10 TABLET ORAL DAILY
Refills: 0 | Status: DISCONTINUED | OUTPATIENT
Start: 2023-04-13 | End: 2023-04-14

## 2023-04-13 RX ORDER — SODIUM CHLORIDE 9 MG/ML
1000 INJECTION, SOLUTION INTRAVENOUS ONCE
Refills: 0 | Status: COMPLETED | OUTPATIENT
Start: 2023-04-13 | End: 2023-04-13

## 2023-04-13 RX ORDER — SODIUM CHLORIDE 9 MG/ML
1000 INJECTION, SOLUTION INTRAVENOUS
Refills: 0 | Status: DISCONTINUED | OUTPATIENT
Start: 2023-04-13 | End: 2023-04-13

## 2023-04-13 RX ORDER — HEPARIN SODIUM 5000 [USP'U]/ML
INJECTION INTRAVENOUS; SUBCUTANEOUS
Qty: 25000 | Refills: 0 | Status: DISCONTINUED | OUTPATIENT
Start: 2023-04-13 | End: 2023-04-13

## 2023-04-13 RX ORDER — ACETAMINOPHEN 500 MG
650 TABLET ORAL ONCE
Refills: 0 | Status: COMPLETED | OUTPATIENT
Start: 2023-04-13 | End: 2023-04-13

## 2023-04-13 RX ORDER — LIDOCAINE 4 G/100G
1 CREAM TOPICAL ONCE
Refills: 0 | Status: COMPLETED | OUTPATIENT
Start: 2023-04-13 | End: 2023-04-13

## 2023-04-13 RX ORDER — CHLORHEXIDINE GLUCONATE 213 G/1000ML
1 SOLUTION TOPICAL ONCE
Refills: 0 | Status: COMPLETED | OUTPATIENT
Start: 2023-04-13 | End: 2023-04-13

## 2023-04-13 RX ORDER — ACETAMINOPHEN 500 MG
650 TABLET ORAL EVERY 6 HOURS
Refills: 0 | Status: DISCONTINUED | OUTPATIENT
Start: 2023-04-13 | End: 2023-04-14

## 2023-04-13 RX ORDER — OXYCODONE HYDROCHLORIDE 5 MG/1
5 TABLET ORAL EVERY 4 HOURS
Refills: 0 | Status: DISCONTINUED | OUTPATIENT
Start: 2023-04-13 | End: 2023-04-14

## 2023-04-13 RX ADMIN — SODIUM CHLORIDE 1000 MILLILITER(S): 9 INJECTION, SOLUTION INTRAVENOUS at 11:29

## 2023-04-13 RX ADMIN — Medication 1 APPLICATION(S): at 14:53

## 2023-04-13 RX ADMIN — ARIPIPRAZOLE 10 MILLIGRAM(S): 15 TABLET ORAL at 13:08

## 2023-04-13 RX ADMIN — SODIUM CHLORIDE 120 MILLILITER(S): 9 INJECTION, SOLUTION INTRAVENOUS at 12:01

## 2023-04-13 RX ADMIN — HEPARIN SODIUM 1400 UNIT(S)/HR: 5000 INJECTION INTRAVENOUS; SUBCUTANEOUS at 11:12

## 2023-04-13 RX ADMIN — Medication 1 TABLET(S): at 13:08

## 2023-04-13 RX ADMIN — Medication 650 MILLIGRAM(S): at 01:14

## 2023-04-13 RX ADMIN — Medication 1 PATCH: at 19:45

## 2023-04-13 RX ADMIN — PANTOPRAZOLE SODIUM 40 MILLIGRAM(S): 20 TABLET, DELAYED RELEASE ORAL at 13:09

## 2023-04-13 RX ADMIN — Medication 1 PATCH: at 13:08

## 2023-04-13 RX ADMIN — CHLORHEXIDINE GLUCONATE 1 APPLICATION(S): 213 SOLUTION TOPICAL at 13:09

## 2023-04-13 RX ADMIN — LIDOCAINE 1 PATCH: 4 CREAM TOPICAL at 01:14

## 2023-04-13 RX ADMIN — LIDOCAINE 1 PATCH: 4 CREAM TOPICAL at 14:45

## 2023-04-13 NOTE — ED ADULT NURSE NOTE - NURSING MUSC EXTREMITY LIMITED ROM
left lower extremity/right lower extremity Procedure To Be Performed At Next Visit: Excision Procedure To Be Performed At Next Visit: Biopsy by shave method Scheduling Instructions (Optional): Patient will call to schedule Detail Level: Zone Scheduling Instructions (Optional): schedule with Milagro after the wedding

## 2023-04-13 NOTE — H&P ADULT - ASSESSMENT
71yo man, undomiciled, hx schizophrenia, hx subsegmental PE (recent dx March 2023, poor adherence on Eliquis), who presents for 2-3 week history severe R hip/groin pain. Patient is poor historian however able to report severe pain symptoms in R hip/groin region for past 2-3 weeks, found on imaging to have R femoral neck fracture, planned to undergo subcutaneous nail pinning with Ortho team.

## 2023-04-13 NOTE — ED PROVIDER NOTE - PHYSICAL EXAMINATION
Constitutional: Well appearing, well nourished, awake, alert, oriented to person, place, time/situation and in no apparent distress.   Head atraumatic, normocephalic. No signs of trauma  ENMT: Airway patent. Normal MM  Eyes: Clear bilaterally, PERRL, EOMI  Chest: no trauma  Cardiac: Normal rate, regular rhythm.  Heart sounds S1, S2.  No murmurs, rubs or gallops.  Respiratory: Breaths sounds equal and clear b/l. No increased WOB, tachypnea, hypoxia, or accessory mm use. Pt speaks in full sentences.   Gastrointestinal: Abd soft, NT, ND, NABS. No guarding, rebound, or rigidity. No pulsatile abdominal masses. No organomegaly appreciated. no trauma  Musculoskeletal: Pelvis stable. Dec ROM 2/2 L groin hip pain, but no deformity appreciated. no erythema / warmth overlying the area. no asymmetric edema. no calf ttp. FROM all joints x 3 other ext w/o dec ROM, pain, or signs of trauma. 2+ pedal pulses  Neuro: Alert and wake, face symmetric. nml gross motor movement, No focal deficits noted.  Skin: Skin normal color for race, warm, dry and intact. No evidence of rash.  Psych: Alert and oriented to person, place, time/situation. normal mood and affect.

## 2023-04-13 NOTE — ED PROVIDER NOTE - WR INTERPRETATION 2
MSK XR negative - ? new step-off in the femoral neck. No dislocation, No foreign body. CT ordered for further eval

## 2023-04-13 NOTE — H&P ADULT - PROBLEM SELECTOR PLAN 4
- will give trial 1L LR to address likely prerenal etiology as clinically dry appearing on exam  - f/u repeat BMP post-fluids

## 2023-04-13 NOTE — H&P ADULT - HISTORY OF PRESENT ILLNESS
Mr. Mathur is a 73yo man, undomiciled, hx schizophrenia, hx subsegmental PE (recent dx March 2023, poor adherence on Eliquis), who presents for 2-3 week history severe R hip/groin pain. Patient is poor historian however able to report severe pain symptoms in R hip/groin region for past 2-3 weeks, however does not directly respond when asked about any preceding mechanical trauma history. He walks with a walker at baseline, but unable to estimate a duration/chronicity of use. He was recently admitted to [facility] in March for hypothermia and managed for septic shock without known source, and incidentally found to have b/l subsegmental PEs w/o heart strain, subsequently discharged on Eliquis. He reports taking only 1 tablet once a day, however admits to frequently missing doses and recalls last dose was approximatley 2-3 days ago. He otherwise denies recent or active fever, chills, headaches, nausea, vomiting, chest pain, dyspnea on exertion, cough, sputum production, hemoptysis, lightheadedness, abdominal pain, genitourinary sx, acute extremity weakness. Mr. Mathur is a 73yo man, undomiciled, hx schizophrenia, hx subsegmental PE (recent dx March 2023, poor adherence on Eliquis), who presents for 2-3 week history severe R hip/groin pain. Patient is poor historian however able to report severe pain symptoms in R hip/groin region for past 2-3 weeks, however does not directly respond when asked about any preceding mechanical trauma history. He walks with a walker at baseline, but unable to estimate a duration/chronicity of use. He was recently admitted to [facility] in March for hypothermia and managed for septic shock without known source, and incidentally found to have b/l subsegmental PEs w/o heart strain, subsequently discharged on Eliquis. He reports taking only 1 tablet once a day, however admits to frequently missing doses and recalls last dose was approximately 2-3 days ago. He otherwise denies recent or active fever, chills, headaches, nausea, vomiting, chest pain, dyspnea on exertion, cough, sputum production, hemoptysis, lightheadedness, abdominal pain, genitourinary sx, acute extremity weakness.

## 2023-04-13 NOTE — H&P ADULT - PROBLEM SELECTOR PLAN 5
F: 1L LR  E: replete PRN  N: NPO before procedure, regular diet post-procedure  GI ppx: c/w home protonix  DVT ppx: heparin gtt

## 2023-04-13 NOTE — ED PROVIDER NOTE - PROGRESS NOTE DETAILS
Pain improved w/ inc ROM but still w/ pain, given possible new stepoff vs rotation, will get CT +fracture on CT - ortho consulted, labs ordered pending ortho recs

## 2023-04-13 NOTE — CONSULT NOTE ADULT - ATTENDING COMMENTS
PATIENT HAS BEEN SEEN AND EXAMINED ALONG WITH THE RESIDENT STAFF.  THIS DISPLACED FEMORAL NECK FRACTURE IS INDICATED FOR CLOSED REDUCTION PERCUTATEOUS PINNING. NON-OPERATIVE TREATMENT HAS BEEN OFFERED BUT NOT RECOMMENDED. I EXPLAINED TO THE PATIENT THE RISKS BENEFITS AND ALTERNATIVES OF BOTH OPERATIVE AND NON OPERATIVE TREATMENT.     I EXPLAINED THAT THE RISKS INCLUDE BUT ARE NOT LIMITED TO: INFECTION, BLEEDING, NEED FOR A BLOOD TRANSFUSION, DAMAGE TO THE NERVES, BLOOD VESSELS LIGAMENTS AND TENDONS IN THE AREA, AVASCULAR NECROSIS, NONUNION, MALUNION, INFECTIONS REQUIRING REVISION SURGERY. RISK OF CHRONIC PAIN. THE POSSIBILITY OF FUTURE SURGERY AND THE NEED FOR EXTENSIVE PHYSICAL THERAPY AFTERWARDS AS WELL AS THE NEED FOR CLOSE FOLLOW-UP WAS EMPHASIZED. THE RISKS OF BLOOD CLOTS AND PULMONARY EMBOLI WAS DISCUSSED ALONG WITH THE OVERALL INCREASED RISK OF MEDICAL COMPLICATIONS WHICH CAN EVEN INCLUDE DEATH. THE PATIENT IS ON THERAPEUTIC ANTICOAGULATION WITH HISTORY OF PE THEREFORE CANNULATED SCREW CONSTRUCT WAS SELECTED INSTEAD OF SHS DUE TO LESS BLOOD LOSS AND FASTER SURGERY.    ALL QUESTIONS ANSWERED AND CONSENT HAS BEEN OBTAINED.

## 2023-04-13 NOTE — H&P ADULT - PROBLEM SELECTOR PLAN 3
Pt w/ known hx b/l subsegmental PE diagnosed in March on prior admission, otherwise w/o signs of heart strain on TTE. Discharged on Eliquis 5mg BID however pt reports poor adherence and subtherapeutic dosing.  - c/w heparin gtt for periprocedure anticoagulation  - will need to address plan for possible IVC filter placement post-procedure in setting of poor AC adherence    #R/O  - will obtain b/l duplex to evaluate for LE DVT in setting b/l LE edema noted on exam Pt w/ known hx b/l subsegmental PE diagnosed in March on prior admission, otherwise w/o signs of heart strain on TTE. Discharged on Eliquis 5mg BID however pt reports poor adherence and subtherapeutic dosing.  - c/w heparin gtt for periprocedure anticoagulation  - will need to address plan for possible IVC filter placement post-procedure in setting of poor AC adherence    #R/O DVT  - will obtain b/l duplex to evaluate for LE DVT in setting b/l LE edema noted on exam

## 2023-04-13 NOTE — H&P ADULT - PROBLEM SELECTOR PLAN 2
- plan as above  - oxycodone 5mg Q4-6hrs PRN for severe pain  - lidocaine patch for moderate pain  - tylenol for mild pain

## 2023-04-13 NOTE — H&P ADULT - PROBLEM SELECTOR PLAN 1
Pt admitted for subcutaneous nail pinning for non-displaced femoral neck fracture. Patient denies high risk cardiac features including chest pain, sob, dyspnea on exertion, palpitations, orthopnea. ECG showing NSR. CXR showing clear lungs. TTE in March w/o underlying signs to suggest ischemia or pHTN. Prior to injury, pt reports good exercise tolerance, able to tolerate ambulating greater than 2 city blocks on level ground and greater than at least 1 flight of stairs without issue. Functional METS >4.  - RCRI class I, 0 pts  - MEYER 0.3%  - Patient is low risk for low-to-intermediate risk procedure  - given recent subsegmental PE w/o strain <3mo ago (dx in March), recommend bridging anticoagulation w/ heparin gtt, hold heparin gtt 2 hrs prior to procedure, restart 24 hours after procedure. Pt admitted for subcutaneous nail pinning for non-displaced femoral neck fracture. Patient denies high risk cardiac features including chest pain, sob, dyspnea on exertion, palpitations, orthopnea. ECG showing NSR. CXR showing clear lungs. TTE in March w/o underlying signs to suggest ischemia or pHTN. Prior to injury, pt reports good exercise tolerance, able to tolerate ambulating greater than 2 city blocks on level ground and greater than at least 1 flight of stairs without issue. Functional METS >4.  - RCRI class I, 0 pts  - MEYER 0.3%  - Patient is low risk for low-to-intermediate risk procedure, no further cardiac testing required  - given recent subsegmental PE w/o strain <3mo ago (dx in March), recommend bridging anticoagulation w/ heparin gtt, hold heparin gtt 2 hrs prior to procedure, restart 24 hours after procedure.  - maintenance fluids while NPO, d5 +LR 120cc/hr

## 2023-04-13 NOTE — H&P ADULT - NSHPPHYSICALEXAM_GEN_ALL_CORE
T(C): 36.6 (04-13-23 @ 05:11), Max: 36.8 (04-12-23 @ 23:01)  HR: 54 (04-13-23 @ 05:11) (54 - 81)  BP: 126/77 (04-13-23 @ 05:11) (126/77 - 133/69)  RR: 18 (04-13-23 @ 05:11) (18 - 18)  SpO2: 98% (04-13-23 @ 05:11) (98% - 100%)    General: NAD, laying in bed, tired-appearing, speaking in full sentences  HEENT: +1-2cm diameter well-circumscribed raised nodule on center of forehead, no conjunctival injection, EOMI, MMM  Neck: supple, no JVD  Cardio: RRR, +S1/S2, no M/R/G  Resp: lungs CTAB, no cough/wheezes/rales/rhonchi  Abdo: soft, NT, ND, +BS, no organomegaly or palpable mass    Extremities: WWP, +b/l 1+ LE below the knees otherwise no cyanosis/clubbing   Vasc: 2+ radial and DP pulses b/l  Neuro: A&Ox3  Psych: speech non-pressured, thoughts intermittently circumferential and tangential  Skin: dry, intact, no visible jaundice   MSK: no joint swelling

## 2023-04-13 NOTE — ED PROVIDER NOTE - DATE/TIME 3
19 y/o female no pmhx, presents to the ED for evaluation of severe abdominal pain that began yesterday, severe sharp in nature and constant with intermittent cramping pain. patient reports she was just resting when the pain began. patient with no fever or chills. endorses nausea and poor appetite without vomiting. no bm yesterday. went to [facility] yesterday had US showing some fluid in left adenexa and was advised perhaps she had a ruptured cyst although no large cyst measured. noted to have normal doppler. saw outpatient ob gyn today who repeated scan and brings report indicating no adnexal masses or free fluid, resolving cyst on the right. send in to rule out diverticulitis. denies urinary sx, vaginal discharge or bleeding.
13-Apr-2023 06:47

## 2023-04-13 NOTE — ED PROVIDER NOTE - OBJECTIVE STATEMENT
Pt is undomiciled, w/ PMHx HTN, ?schizophrenia, recently admitted here for AMS/sepsis from 3/15-3/23, found RLL segmental PE on Eliquis, now p/w RLE pain. He points to the region of the R thigh, in the muscle. He states he couldn't walk from the pain. He denies trauma. He denies back pain. He denies numbness/  weakness. He reports dec ROM 2/2 pain. Poor historian

## 2023-04-13 NOTE — ED ADULT NURSE NOTE - OBJECTIVE STATEMENT
72yM undomiciled, HTN, recently admitted here for AMS/sepsis from 3/15-3/23, p/w c/o pain to the inner Rt thigh. Says it feels like "a breakdown" but unable to describe further. No scrotum or testicular pain. Pt is a poor historian. Denies back pain.

## 2023-04-13 NOTE — H&P ADULT - NSHPLABSRESULTS_GEN_ALL_CORE
LABS:                        8.9    4.18  )-----------( 364      ( 13 Apr 2023 05:25 )             29.5     04-13    142  |  104  |  34<H>  ----------------------------<  59<L>  4.6   |  22  |  1.42<H>    Ca    9.8      13 Apr 2023 05:25        PT/INR - ( 13 Apr 2023 05:25 )   PT: 12.9 sec;   INR: 1.08          PTT - ( 13 Apr 2023 05:25 )  PTT:39.4 sec  CAPILLARY BLOOD GLUCOSE                  Urinalysis Basic - ( 13 Apr 2023 05:25 )    Color: Yellow / Appearance: Clear / SG: >=1.030 / pH: x  Gluc: x / Ketone: Trace mg/dL  / Bili: Small / Urobili: 0.2 E.U./dL   Blood: x / Protein: NEGATIVE mg/dL / Nitrite: NEGATIVE   Leuk Esterase: Trace / RBC: < 5 /HPF / WBC < 5 /HPF   Sq Epi: x / Non Sq Epi: x / Bacteria: Present /HPF                      I & O Summary:      Microbiology:    Urinalysis with Rflx Culture (collected 04-13-23 @ 05:25)        Urinalysis with Rflx Culture (collected 13 Apr 2023 05:25)        RADIOLOGY, EKG AND ADDITIONAL TESTS: Reviewed.

## 2023-04-13 NOTE — ED PROVIDER NOTE - NS ED ROS FT
Constitutional: No fever or chills.   MS: See HPI  Neuro: No headache or weakness. No LOC.  Skin: No skin rash.   Except as documented in the HPI, all other systems are negative.

## 2023-04-13 NOTE — ED PROVIDER NOTE - CLINICAL SUMMARY MEDICAL DECISION MAKING FREE TEXT BOX
Atraumatic leg pain, recently seen in this ED for similar complaint, s/p neg XR returns for the same, reports diff ambulating. No back pain, numbness/tingling, nor incontinence. Pain in anterior upper thigh / groin. Undomiciled, possibly looking for shelter. Check XR, analgesia.

## 2023-04-13 NOTE — PATIENT PROFILE ADULT - FUNCTIONAL ASSESSMENT - DAILY ACTIVITY 4.
Mantoux read.  Redness 0.  Induration 0.  Negative TB results.  Letter given to pt with results.  KPavelRN    
3 = A little assistance

## 2023-04-13 NOTE — PATIENT PROFILE ADULT - FALL HARM RISK - HARM RISK INTERVENTIONS

## 2023-04-13 NOTE — ED PROVIDER NOTE - CARE PLAN
Principal Discharge DX:	Leg pain, right   1 Principal Discharge DX:	Fracture of femoral neck, right

## 2023-04-14 ENCOUNTER — TRANSCRIPTION ENCOUNTER (OUTPATIENT)
Age: 73
End: 2023-04-14

## 2023-04-14 DIAGNOSIS — E16.2 HYPOGLYCEMIA, UNSPECIFIED: ICD-10-CM

## 2023-04-14 LAB
ANION GAP SERPL CALC-SCNC: 11 MMOL/L — SIGNIFICANT CHANGE UP (ref 5–17)
APTT BLD: 149.6 SEC — CRITICAL HIGH (ref 27.5–35.5)
APTT BLD: 39.5 SEC — HIGH (ref 27.5–35.5)
APTT BLD: 53.9 SEC — HIGH (ref 27.5–35.5)
BASOPHILS # BLD AUTO: 0.03 K/UL — SIGNIFICANT CHANGE UP (ref 0–0.2)
BASOPHILS NFR BLD AUTO: 1 % — SIGNIFICANT CHANGE UP (ref 0–2)
BUN SERPL-MCNC: 26 MG/DL — HIGH (ref 7–23)
CALCIUM SERPL-MCNC: 9.1 MG/DL — SIGNIFICANT CHANGE UP (ref 8.4–10.5)
CHLORIDE SERPL-SCNC: 106 MMOL/L — SIGNIFICANT CHANGE UP (ref 96–108)
CO2 SERPL-SCNC: 24 MMOL/L — SIGNIFICANT CHANGE UP (ref 22–31)
CREAT SERPL-MCNC: 1.03 MG/DL — SIGNIFICANT CHANGE UP (ref 0.5–1.3)
EGFR: 77 ML/MIN/1.73M2 — SIGNIFICANT CHANGE UP
EOSINOPHIL # BLD AUTO: 0.06 K/UL — SIGNIFICANT CHANGE UP (ref 0–0.5)
EOSINOPHIL NFR BLD AUTO: 2 % — SIGNIFICANT CHANGE UP (ref 0–6)
GLUCOSE BLDC GLUCOMTR-MCNC: 144 MG/DL — HIGH (ref 70–99)
GLUCOSE BLDC GLUCOMTR-MCNC: 49 MG/DL — CRITICAL LOW (ref 70–99)
GLUCOSE SERPL-MCNC: 53 MG/DL — CRITICAL LOW (ref 70–99)
HCT VFR BLD CALC: 27.1 % — LOW (ref 39–50)
HGB BLD-MCNC: 8.2 G/DL — LOW (ref 13–17)
IMM GRANULOCYTES NFR BLD AUTO: 0 % — SIGNIFICANT CHANGE UP (ref 0–0.9)
LYMPHOCYTES # BLD AUTO: 1.24 K/UL — SIGNIFICANT CHANGE UP (ref 1–3.3)
LYMPHOCYTES # BLD AUTO: 41.3 % — SIGNIFICANT CHANGE UP (ref 13–44)
MAGNESIUM SERPL-MCNC: 2 MG/DL — SIGNIFICANT CHANGE UP (ref 1.6–2.6)
MCHC RBC-ENTMCNC: 20.5 PG — LOW (ref 27–34)
MCHC RBC-ENTMCNC: 30.3 GM/DL — LOW (ref 32–36)
MCV RBC AUTO: 67.8 FL — LOW (ref 80–100)
MONOCYTES # BLD AUTO: 0.4 K/UL — SIGNIFICANT CHANGE UP (ref 0–0.9)
MONOCYTES NFR BLD AUTO: 13.3 % — SIGNIFICANT CHANGE UP (ref 2–14)
NEUTROPHILS # BLD AUTO: 1.27 K/UL — LOW (ref 1.8–7.4)
NEUTROPHILS NFR BLD AUTO: 42.4 % — LOW (ref 43–77)
NRBC # BLD: 0 /100 WBCS — SIGNIFICANT CHANGE UP (ref 0–0)
PHOSPHATE SERPL-MCNC: 3.3 MG/DL — SIGNIFICANT CHANGE UP (ref 2.5–4.5)
PLATELET # BLD AUTO: 297 K/UL — SIGNIFICANT CHANGE UP (ref 150–400)
POTASSIUM SERPL-MCNC: 3.7 MMOL/L — SIGNIFICANT CHANGE UP (ref 3.5–5.3)
POTASSIUM SERPL-SCNC: 3.7 MMOL/L — SIGNIFICANT CHANGE UP (ref 3.5–5.3)
RBC # BLD: 4 M/UL — LOW (ref 4.2–5.8)
RBC # FLD: 16 % — HIGH (ref 10.3–14.5)
SODIUM SERPL-SCNC: 141 MMOL/L — SIGNIFICANT CHANGE UP (ref 135–145)
WBC # BLD: 3 K/UL — LOW (ref 3.8–10.5)
WBC # FLD AUTO: 3 K/UL — LOW (ref 3.8–10.5)

## 2023-04-14 PROCEDURE — 99223 1ST HOSP IP/OBS HIGH 75: CPT | Mod: GC

## 2023-04-14 PROCEDURE — 72170 X-RAY EXAM OF PELVIS: CPT | Mod: 26

## 2023-04-14 DEVICE — IMPLANTABLE DEVICE: Type: IMPLANTABLE DEVICE | Site: RIGHT | Status: FUNCTIONAL

## 2023-04-14 DEVICE — SCREW P/T ASNS III 6.5X85MM: Type: IMPLANTABLE DEVICE | Site: RIGHT | Status: FUNCTIONAL

## 2023-04-14 DEVICE — GWIRE ASNIS III THREADED 3.2X300MM: Type: IMPLANTABLE DEVICE | Site: RIGHT | Status: FUNCTIONAL

## 2023-04-14 RX ORDER — HEPARIN SODIUM 5000 [USP'U]/ML
1300 INJECTION INTRAVENOUS; SUBCUTANEOUS
Qty: 25000 | Refills: 0 | Status: DISCONTINUED | OUTPATIENT
Start: 2023-04-15 | End: 2023-04-15

## 2023-04-14 RX ORDER — CHLORHEXIDINE GLUCONATE 213 G/1000ML
1 SOLUTION TOPICAL ONCE
Refills: 0 | Status: DISCONTINUED | OUTPATIENT
Start: 2023-04-14 | End: 2023-04-14

## 2023-04-14 RX ORDER — HYDROMORPHONE HYDROCHLORIDE 2 MG/ML
0.5 INJECTION INTRAMUSCULAR; INTRAVENOUS; SUBCUTANEOUS
Refills: 0 | Status: DISCONTINUED | OUTPATIENT
Start: 2023-04-14 | End: 2023-04-15

## 2023-04-14 RX ORDER — OXYCODONE HYDROCHLORIDE 5 MG/1
10 TABLET ORAL EVERY 4 HOURS
Refills: 0 | Status: DISCONTINUED | OUTPATIENT
Start: 2023-04-14 | End: 2023-04-16

## 2023-04-14 RX ORDER — ACETAMINOPHEN 500 MG
650 TABLET ORAL EVERY 6 HOURS
Refills: 0 | Status: DISCONTINUED | OUTPATIENT
Start: 2023-04-14 | End: 2023-04-16

## 2023-04-14 RX ORDER — DEXTROSE 10 % IN WATER 10 %
250 INTRAVENOUS SOLUTION INTRAVENOUS ONCE
Refills: 0 | Status: DISCONTINUED | OUTPATIENT
Start: 2023-04-14 | End: 2023-04-14

## 2023-04-14 RX ORDER — SODIUM CHLORIDE 9 MG/ML
1000 INJECTION, SOLUTION INTRAVENOUS
Refills: 0 | Status: DISCONTINUED | OUTPATIENT
Start: 2023-04-14 | End: 2023-04-15

## 2023-04-14 RX ORDER — POVIDONE-IODINE 5 %
1 AEROSOL (ML) TOPICAL ONCE
Refills: 0 | Status: DISCONTINUED | OUTPATIENT
Start: 2023-04-14 | End: 2023-04-14

## 2023-04-14 RX ORDER — CEFAZOLIN SODIUM 1 G
2000 VIAL (EA) INJECTION EVERY 8 HOURS
Refills: 0 | Status: DISCONTINUED | OUTPATIENT
Start: 2023-04-14 | End: 2023-04-14

## 2023-04-14 RX ORDER — HEPARIN SODIUM 5000 [USP'U]/ML
1400 INJECTION INTRAVENOUS; SUBCUTANEOUS
Qty: 25000 | Refills: 0 | Status: DISCONTINUED | OUTPATIENT
Start: 2023-04-14 | End: 2023-04-14

## 2023-04-14 RX ORDER — HEPARIN SODIUM 5000 [USP'U]/ML
1100 INJECTION INTRAVENOUS; SUBCUTANEOUS
Qty: 25000 | Refills: 0 | Status: DISCONTINUED | OUTPATIENT
Start: 2023-04-14 | End: 2023-04-14

## 2023-04-14 RX ORDER — OXYCODONE HYDROCHLORIDE 5 MG/1
5 TABLET ORAL EVERY 4 HOURS
Refills: 0 | Status: DISCONTINUED | OUTPATIENT
Start: 2023-04-14 | End: 2023-04-16

## 2023-04-14 RX ORDER — HYDROMORPHONE HYDROCHLORIDE 2 MG/ML
0.5 INJECTION INTRAMUSCULAR; INTRAVENOUS; SUBCUTANEOUS EVERY 4 HOURS
Refills: 0 | Status: DISCONTINUED | OUTPATIENT
Start: 2023-04-14 | End: 2023-04-15

## 2023-04-14 RX ORDER — CEFAZOLIN SODIUM 1 G
2000 VIAL (EA) INJECTION EVERY 8 HOURS
Refills: 0 | Status: COMPLETED | OUTPATIENT
Start: 2023-04-14 | End: 2023-04-14

## 2023-04-14 RX ADMIN — HEPARIN SODIUM 14 UNIT(S)/HR: 5000 INJECTION INTRAVENOUS; SUBCUTANEOUS at 13:50

## 2023-04-14 RX ADMIN — HYDROMORPHONE HYDROCHLORIDE 0.5 MILLIGRAM(S): 2 INJECTION INTRAMUSCULAR; INTRAVENOUS; SUBCUTANEOUS at 10:13

## 2023-04-14 RX ADMIN — Medication 100 MILLIGRAM(S): at 16:45

## 2023-04-14 RX ADMIN — HEPARIN SODIUM 14 UNIT(S)/HR: 5000 INJECTION INTRAVENOUS; SUBCUTANEOUS at 00:07

## 2023-04-14 RX ADMIN — HYDROMORPHONE HYDROCHLORIDE 0.5 MILLIGRAM(S): 2 INJECTION INTRAMUSCULAR; INTRAVENOUS; SUBCUTANEOUS at 10:31

## 2023-04-14 RX ADMIN — Medication 100 MILLIGRAM(S): at 22:22

## 2023-04-14 RX ADMIN — PANTOPRAZOLE SODIUM 40 MILLIGRAM(S): 20 TABLET, DELAYED RELEASE ORAL at 06:50

## 2023-04-14 RX ADMIN — SODIUM CHLORIDE 100 MILLILITER(S): 9 INJECTION, SOLUTION INTRAVENOUS at 13:50

## 2023-04-14 NOTE — CHART NOTE - NSCHARTNOTEFT_GEN_A_CORE
Please obtain the following:  [ ] Medical Clearance/Optimization Note  [ ] COVID swab  [ ] Active T&S  [ ] Coags  [ ] UA   [ ] EKG  [ ] CXR  [ ] NPO   [ ] 3M Nasal swab + Chlorhexidine body wipes < 24 hrs of surgery  [ ] Maintenance IVF after MN  [ ] Hold AC tonight, SCDs    Orthopedic Team will obtain surgical consent and add on to OR schedule    Mark Peacock, PGY2  Ortho Pager [phone]
Surgery date and time moved to 4/13/23 at 7:30 am with Dr. Beal as operating room was unable to accommodate surgery overnight due to another emergency addon.     Please continue to keep NPO for surgery in the morning 4/14 at 7:30.  NWB RLE  discontinue hep gtt in preparation for in AM.

## 2023-04-14 NOTE — PROGRESS NOTE ADULT - PROBLEM SELECTOR PLAN 2
- plan as above  - oxycodone 5mg Q4-6hrs PRN for severe pain  - lidocaine patch for moderate pain  - tylenol for mild pain - plan as above, plan for surgery with ortho at 7:30am  - oxycodone 5mg Q4-6hrs PRN for severe pain  - lidocaine patch for moderate pain  - tylenol for mild pain

## 2023-04-14 NOTE — PROGRESS NOTE ADULT - PROBLEM SELECTOR PLAN 1
Pt admitted for subcutaneous nail pinning for non-displaced femoral neck fracture. Patient denies high risk cardiac features including chest pain, sob, dyspnea on exertion, palpitations, orthopnea. ECG showing NSR. CXR showing clear lungs. TTE in March w/o underlying signs to suggest ischemia or pHTN. Prior to injury, pt reports good exercise tolerance, able to tolerate ambulating greater than 2 city blocks on level ground and greater than at least 1 flight of stairs without issue. Functional METS >4.  - RCRI class I, 0 pts  - MEYER 0.3%  - Patient is low risk for low-to-intermediate risk procedure, no further cardiac testing required  - given recent subsegmental PE w/o strain <3mo ago (dx in March), recommend bridging anticoagulation w/ heparin gtt, hold heparin gtt 2 hrs prior to procedure, restart 24 hours after procedure.  - maintenance fluids while NPO, d5 +LR 120cc/hr Pt admitted for subcutaneous nail pinning for non-displaced femoral neck fracture. Patient denies high risk cardiac features including chest pain, sob, dyspnea on exertion, palpitations, orthopnea. ECG showing NSR. CXR showing clear lungs. TTE in March w/o underlying signs to suggest ischemia or pHTN. Prior to injury, pt reports good exercise tolerance, able to tolerate ambulating greater than 2 city blocks on level ground and greater than at least 1 flight of stairs without issue. Functional METS >4.  - RCRI class I, 0 pts  - MEYER 0.3%  - Patient is low risk for low-to-intermediate risk procedure, no further cardiac testing required  - given recent subsegmental PE w/o strain <3mo ago (dx in March), anticoagulation bridged with heparin gtt, held for 2 hrs prior to procedure -> restart after procedure

## 2023-04-14 NOTE — PROGRESS NOTE ADULT - PROBLEM SELECTOR PLAN 5
F: 1L LR  E: replete PRN  N: NPO before procedure, regular diet post-procedure  GI ppx: c/w home protonix  DVT ppx: heparin gtt Had episode of hypoglycemia to 49 in AM, received 250cc d10, improved to 144.   Likely i/s/o prolonged NPO yesterday and this AM perioperatively    Plan:  -FSG q6h

## 2023-04-14 NOTE — PROGRESS NOTE ADULT - SUBJECTIVE AND OBJECTIVE BOX
Ortho Post Op Check    Procedure: Right Hip CRPP  Surgeon: Dr. Beal    Pt seen and examined at bedside. Pt comfortable without complaints, pain controlled  Denies CP, SOB, N/V, numbness/tingling     Vital Signs Last 24 Hrs  T(C): 36.3 (04-14-23 @ 11:30), Max: 36.9 (04-14-23 @ 07:48)  T(F): 97.3 (04-14-23 @ 11:30), Max: 97.3 (04-14-23 @ 11:30)  HR: 55 (04-14-23 @ 11:30) (53 - 79)  BP: 142/67 (04-14-23 @ 11:30) (125/57 - 144/74)  BP(mean): 91 (04-14-23 @ 11:00) (80 - 96)  RR: 18 (04-14-23 @ 11:30) (8 - 19)  SpO2: 98% (04-14-23 @ 11:30) (98% - 100%)  I&O's Summary    13 Apr 2023 07:01  -  14 Apr 2023 07:00  --------------------------------------------------------  IN: 0 mL / OUT: 450 mL / NET: -450 mL    14 Apr 2023 07:01  -  14 Apr 2023 12:32  --------------------------------------------------------  IN: 100 mL / OUT: 0 mL / NET: 100 mL        General: Pt Alert and oriented, NAD  DSG C/D/I - Aquacel to right hip. Overlying ice pack  Pulses: 2+ DP pulses palpable bilaterally. Skin wwp. Cap refill brisk.   Sensation: SILT to bilateral lower extremities  Motor: EHL/FHL/TA/GS 5/5 bilaterally                          8.2    3.00  )-----------( 297      ( 14 Apr 2023 05:42 )             27.1     04-14    141  |  106  |  26<H>  ----------------------------<  53<LL>  3.7   |  24  |  1.03    Ca    9.1      14 Apr 2023 05:42  Phos  3.3     04-14  Mg     2.0     04-14          A/P: 71yo Male POD#0 s/p Right Hip CRPP   - Stable  - Pain Control per primary team  - DVT ppx: Eliquis per med  - Post op abx: Ancef  - PT, WBS: NWALPA RLE    Ortho Pager [phone] Ortho Post Op Check    Procedure: Right Hip CRPP  Surgeon: Dr. Beal    Pt seen and examined at bedside. Pt comfortable without complaints, pain controlled  Denies CP, SOB, N/V, numbness/tingling     Vital Signs Last 24 Hrs  T(C): 36.3 (04-14-23 @ 11:30), Max: 36.9 (04-14-23 @ 07:48)  T(F): 97.3 (04-14-23 @ 11:30), Max: 97.3 (04-14-23 @ 11:30)  HR: 55 (04-14-23 @ 11:30) (53 - 79)  BP: 142/67 (04-14-23 @ 11:30) (125/57 - 144/74)  BP(mean): 91 (04-14-23 @ 11:00) (80 - 96)  RR: 18 (04-14-23 @ 11:30) (8 - 19)  SpO2: 98% (04-14-23 @ 11:30) (98% - 100%)  I&O's Summary    13 Apr 2023 07:01  -  14 Apr 2023 07:00  --------------------------------------------------------  IN: 0 mL / OUT: 450 mL / NET: -450 mL    14 Apr 2023 07:01  -  14 Apr 2023 12:32  --------------------------------------------------------  IN: 100 mL / OUT: 0 mL / NET: 100 mL        General: Pt Alert and oriented, NAD  DSG C/D/I - Aquacel to right hip. Overlying ice pack  Pulses: 2+ DP pulses palpable bilaterally. Skin wwp. Cap refill brisk.   Sensation: SILT to bilateral lower extremities  Motor: EHL/FHL/TA/GS 5/5 bilaterally                          8.2    3.00  )-----------( 297      ( 14 Apr 2023 05:42 )             27.1     04-14    141  |  106  |  26<H>  ----------------------------<  53<LL>  3.7   |  24  |  1.03    Ca    9.1      14 Apr 2023 05:42  Phos  3.3     04-14  Mg     2.0     04-14          A/P: 73yo Male POD#0 s/p Right Hip CRPP   - Stable  - Pain Control per primary team  - DVT ppx: Eliquis per med  - Post op abx: Ancef  - PT, WBS: WBAT     Ortho Pager [phone] Ortho Post Op Check    Procedure: Right Hip CRPP  Surgeon: Dr. Beal    Pt seen and examined at bedside. Pt comfortable without complaints, pain controlled  Denies CP, SOB, N/V, numbness/tingling     Vital Signs Last 24 Hrs  T(C): 36.3 (04-14-23 @ 11:30), Max: 36.9 (04-14-23 @ 07:48)  T(F): 97.3 (04-14-23 @ 11:30), Max: 97.3 (04-14-23 @ 11:30)  HR: 55 (04-14-23 @ 11:30) (53 - 79)  BP: 142/67 (04-14-23 @ 11:30) (125/57 - 144/74)  BP(mean): 91 (04-14-23 @ 11:00) (80 - 96)  RR: 18 (04-14-23 @ 11:30) (8 - 19)  SpO2: 98% (04-14-23 @ 11:30) (98% - 100%)  I&O's Summary    13 Apr 2023 07:01  -  14 Apr 2023 07:00  --------------------------------------------------------  IN: 0 mL / OUT: 450 mL / NET: -450 mL    14 Apr 2023 07:01  -  14 Apr 2023 12:32  --------------------------------------------------------  IN: 100 mL / OUT: 0 mL / NET: 100 mL        General: Pt Alert and oriented, NAD  DSG C/D/I - Aquacel to right hip. Overlying ice pack  Pulses: 2+ DP pulses palpable bilaterally. Skin wwp. Cap refill brisk.   Sensation: SILT to bilateral lower extremities  Motor: EHL/FHL/TA/GS 5/5 bilaterally                          8.2    3.00  )-----------( 297      ( 14 Apr 2023 05:42 )             27.1     04-14    141  |  106  |  26<H>  ----------------------------<  53<LL>  3.7   |  24  |  1.03    Ca    9.1      14 Apr 2023 05:42  Phos  3.3     04-14  Mg     2.0     04-14          A/P: 73yo Male POD#0 s/p Right Hip CRPP   - Stable  - Pain Control per primary team  - DVT ppx: deferred to primary team  - Post op abx: Ancef  - PT, WBS: WBAT     Ortho Pager [phone]

## 2023-04-14 NOTE — PROGRESS NOTE ADULT - SUBJECTIVE AND OBJECTIVE BOX
Internal Medicine Progress Note  Sherie Hastings, PGY-1    ******INCOMPLETE******    OVERNIGHT EVENTS/INTERVAL HPI:    OBJECTIVE:  Vital Signs Last 24 Hrs  T(C): 36.9 (14 Apr 2023 06:30), Max: 36.9 (14 Apr 2023 05:15)  T(F): 98.5 (14 Apr 2023 06:30), Max: 98.5 (14 Apr 2023 05:15)  HR: 57 (14 Apr 2023 06:30) (52 - 60)  BP: 144/74 (14 Apr 2023 06:30) (121/60 - 152/58)  BP(mean): --  RR: 18 (14 Apr 2023 06:30) (16 - 18)  SpO2: 99% (14 Apr 2023 06:30) (99% - 100%)    Parameters below as of 14 Apr 2023 05:15  Patient On (Oxygen Delivery Method): room air      I&O's Detail    13 Apr 2023 07:01  -  14 Apr 2023 07:00  --------------------------------------------------------  IN:  Total IN: 0 mL    OUT:    Voided (mL): 450 mL  Total OUT: 450 mL    Total NET: -450 mL        Physical Exam:  GENERAL: Awake, alert and interactive, no acute distress, appears comfortable  NEURO: A&Ox4, no focal deficits, 5/5 strength in all ext, reflexes 2+ throughout, CN 2-12 intact  HEENT: Normocephalic, atraumatic, no conjunctivitis or scleral icterus, oral mucosa moist, no oral lesions noted  NECK: Supple, no LAD, no JVD, thyroid not palpable  CARDIAC: Regular rate and rhythm, +S1/S2, no murmurs/rubs/gallops  PULM: Breathing comfortably on RA, clear to auscultation bilaterally, no wheezes/rales/rhonchi  ABDOMEN: Soft, nontender, nondistended, +bs, no hepatosplenomegaly, no rebound tenderness or fluid wave, no CVA tenderness  : Deferred  MSK: Range of motion grossly intact, no back tenderness  SKIN: Warm and dry, no rashes, lesions  VASC: Cap refil < 2 sec, 2+ peripheral pulses, no edema, no LE tenderness  Psych: Appropriate affect    Medications:  MEDICATIONS  (STANDING):  ARIPiprazole 10 milliGRAM(s) Oral daily  chlorhexidine 2% Cloths 1 Application(s) Topical once  dextrose 10% Bolus 250 milliLiter(s) IV Bolus once  multivitamin 1 Tablet(s) Oral daily  nicotine - 21 mG/24Hr(s) Patch 1 Patch Transdermal daily  pantoprazole    Tablet 40 milliGRAM(s) Oral before breakfast  povidone iodine 5% Nasal Swab 1 Application(s) Both Nostrils once    MEDICATIONS  (PRN):  acetaminophen     Tablet .. 650 milliGRAM(s) Oral every 6 hours PRN Temp greater or equal to 38C (100.4F), Mild Pain (1 - 3)  oxyCODONE    IR 5 milliGRAM(s) Oral every 4 hours PRN Severe Pain (7 - 10)      Labs:                        8.2    3.00  )-----------( 297      ( 14 Apr 2023 05:42 )             27.1     04-14    141  |  106  |  26<H>  ----------------------------<  53<LL>  3.7   |  24  |  1.03    Ca    9.1      14 Apr 2023 05:42  Phos  3.3     04-14  Mg     2.0     04-14      PT/INR - ( 13 Apr 2023 05:25 )   PT: 12.9 sec;   INR: 1.08          PTT - ( 14 Apr 2023 05:42 )  PTT:39.5 sec    Urinalysis Basic - ( 13 Apr 2023 05:25 )    Color: Yellow / Appearance: Clear / SG: >=1.030 / pH: x  Gluc: x / Ketone: Trace mg/dL  / Bili: Small / Urobili: 0.2 E.U./dL   Blood: x / Protein: NEGATIVE mg/dL / Nitrite: NEGATIVE   Leuk Esterase: Trace / RBC: < 5 /HPF / WBC < 5 /HPF   Sq Epi: x / Non Sq Epi: x / Bacteria: Present /HPF      COVID-19 PCR: Negative (13 Apr 2023 05:25)  SARS-CoV-2: NotDetec (15 Mar 2023 11:17)      Radiology: Reviewed OVERNIGHT EVENTS/INTERVAL HPI: No acute events overnight.    SUBJECTIVE: Pt examined at bedside this AM, patient is only c/o pain in R groin that radiates to the R midthigh. Denies HA, dizziness, lightheadedness, chest pain, diaphoresis, nausea, abdominal pain.     OBJECTIVE:  Vital Signs Last 24 Hrs  T(C): 36.9 (14 Apr 2023 06:30), Max: 36.9 (14 Apr 2023 05:15)  T(F): 98.5 (14 Apr 2023 06:30), Max: 98.5 (14 Apr 2023 05:15)  HR: 57 (14 Apr 2023 06:30) (52 - 60)  BP: 144/74 (14 Apr 2023 06:30) (121/60 - 152/58)  BP(mean): --  RR: 18 (14 Apr 2023 06:30) (16 - 18)  SpO2: 99% (14 Apr 2023 06:30) (99% - 100%)    Parameters below as of 14 Apr 2023 05:15  Patient On (Oxygen Delivery Method): room air      I&O's Detail    13 Apr 2023 07:01  -  14 Apr 2023 07:00  --------------------------------------------------------  IN:  Total IN: 0 mL    OUT:    Voided (mL): 450 mL  Total OUT: 450 mL    Total NET: -450 mL      General: NAD, laying in bed, appears comfortable  HEENT: +1-2cm diameter well-circumscribed raised nodule on center of forehead, no conjunctival injection, EOMI, MMM  Neck: supple, no JVD  Cardio: RRR, +S1/S2, no M/R/G  Resp: lungs CTAB, no cough/wheezes/rales/rhonchi  Abdo: soft, NT, ND, +BS, no organomegaly or palpable mass    Extremities: WWP, +b/l 1+ LE below the knees otherwise no cyanosis/clubbing   Vasc: 2+ radial and DP pulses b/l  Neuro: A&Ox3  Psych: speech non-pressured  Skin: dry, intact, no visible jaundice   MSK: no joint swelling    Medications:  MEDICATIONS  (STANDING):  ARIPiprazole 10 milliGRAM(s) Oral daily  chlorhexidine 2% Cloths 1 Application(s) Topical once  dextrose 10% Bolus 250 milliLiter(s) IV Bolus once  multivitamin 1 Tablet(s) Oral daily  nicotine - 21 mG/24Hr(s) Patch 1 Patch Transdermal daily  pantoprazole    Tablet 40 milliGRAM(s) Oral before breakfast  povidone iodine 5% Nasal Swab 1 Application(s) Both Nostrils once    MEDICATIONS  (PRN):  acetaminophen     Tablet .. 650 milliGRAM(s) Oral every 6 hours PRN Temp greater or equal to 38C (100.4F), Mild Pain (1 - 3)  oxyCODONE    IR 5 milliGRAM(s) Oral every 4 hours PRN Severe Pain (7 - 10)      Labs:                        8.2    3.00  )-----------( 297      ( 14 Apr 2023 05:42 )             27.1     04-14    141  |  106  |  26<H>  ----------------------------<  53<LL>  3.7   |  24  |  1.03    Ca    9.1      14 Apr 2023 05:42  Phos  3.3     04-14  Mg     2.0     04-14      PT/INR - ( 13 Apr 2023 05:25 )   PT: 12.9 sec;   INR: 1.08          PTT - ( 14 Apr 2023 05:42 )  PTT:39.5 sec    Urinalysis Basic - ( 13 Apr 2023 05:25 )    Color: Yellow / Appearance: Clear / SG: >=1.030 / pH: x  Gluc: x / Ketone: Trace mg/dL  / Bili: Small / Urobili: 0.2 E.U./dL   Blood: x / Protein: NEGATIVE mg/dL / Nitrite: NEGATIVE   Leuk Esterase: Trace / RBC: < 5 /HPF / WBC < 5 /HPF   Sq Epi: x / Non Sq Epi: x / Bacteria: Present /HPF      COVID-19 PCR: Negative (13 Apr 2023 05:25)  SARS-CoV-2: NotDetec (15 Mar 2023 11:17)

## 2023-04-14 NOTE — BRIEF OPERATIVE NOTE - NSICDXBRIEFPROCEDURE_GEN_ALL_CORE_FT
PROCEDURES:  Closed reduction and percutaneous pinning of right hip 14-Apr-2023 10:09:31  Mark Peacock

## 2023-04-14 NOTE — PROGRESS NOTE ADULT - PROBLEM SELECTOR PLAN 3
Pt w/ known hx b/l subsegmental PE diagnosed in March on prior admission, otherwise w/o signs of heart strain on TTE. Discharged on Eliquis 5mg BID however pt reports poor adherence and subtherapeutic dosing.  - c/w heparin gtt for periprocedure anticoagulation  - will need to address plan for possible IVC filter placement post-procedure in setting of poor AC adherence    #R/O DVT  - will obtain b/l duplex to evaluate for LE DVT in setting b/l LE edema noted on exam Pt w/ known hx b/l subsegmental PE diagnosed in March on prior admission, otherwise w/o signs of heart strain on TTE. Discharged on Eliquis 5mg BID however pt reports poor adherence and subtherapeutic dosing.  - c/w heparin gtt for periprocedure anticoagulation, hold and resume AC after procedure  - will need to address plan for possible IVC filter placement post-procedure in setting of poor AC adherence    #R/O DVT  - Doppler US bilateral LE: negative for DVT

## 2023-04-14 NOTE — PRE-ANESTHESIA EVALUATION ADULT - NSANTHOSAYNRD_GEN_A_CORE
No. MAURI screening performed.  STOP BANG Legend: 0-2 = LOW Risk; 3-4 = INTERMEDIATE Risk; 5-8 = HIGH Risk

## 2023-04-14 NOTE — PROVIDER CONTACT NOTE (CRITICAL VALUE NOTIFICATION) - SITUATION
Laboratory called @ 1920 to report a critical value aPTT 149.6. MD Rivera made aware of lab results.
can not process drug urine sample because the ph >5

## 2023-04-14 NOTE — PROGRESS NOTE ADULT - PROBLEM SELECTOR PLAN 4
- will give trial 1L LR to address likely prerenal etiology as clinically dry appearing on exam  - f/u repeat BMP post-fluids Cr 1.42 on admission from 0.7 at baseline in 03/2023  S/p 1L LR + maintenance fluids  Improved to 1 s/p fluids    Plan:   - c/t to monitor BMP  - no need for further fluids at this time

## 2023-04-15 DIAGNOSIS — D50.9 IRON DEFICIENCY ANEMIA, UNSPECIFIED: ICD-10-CM

## 2023-04-15 LAB
ANION GAP SERPL CALC-SCNC: 8 MMOL/L — SIGNIFICANT CHANGE UP (ref 5–17)
APPEARANCE UR: CLEAR — SIGNIFICANT CHANGE UP
APTT BLD: 90.2 SEC — HIGH (ref 27.5–35.5)
APTT BLD: 91.2 SEC — HIGH (ref 27.5–35.5)
APTT BLD: 99.3 SEC — HIGH (ref 27.5–35.5)
BILIRUB UR-MCNC: NEGATIVE — SIGNIFICANT CHANGE UP
BUN SERPL-MCNC: 17 MG/DL — SIGNIFICANT CHANGE UP (ref 7–23)
CALCIUM SERPL-MCNC: 8.6 MG/DL — SIGNIFICANT CHANGE UP (ref 8.4–10.5)
CHLORIDE SERPL-SCNC: 103 MMOL/L — SIGNIFICANT CHANGE UP (ref 96–108)
CO2 SERPL-SCNC: 26 MMOL/L — SIGNIFICANT CHANGE UP (ref 22–31)
COLOR SPEC: YELLOW — SIGNIFICANT CHANGE UP
CREAT SERPL-MCNC: 1.02 MG/DL — SIGNIFICANT CHANGE UP (ref 0.5–1.3)
DIFF PNL FLD: NEGATIVE — SIGNIFICANT CHANGE UP
EGFR: 78 ML/MIN/1.73M2 — SIGNIFICANT CHANGE UP
FERRITIN SERPL-MCNC: 28 NG/ML — LOW (ref 30–400)
GLUCOSE SERPL-MCNC: 101 MG/DL — HIGH (ref 70–99)
GLUCOSE UR QL: NEGATIVE — SIGNIFICANT CHANGE UP
HCT VFR BLD CALC: 23.2 % — LOW (ref 39–50)
HGB BLD-MCNC: 7.1 G/DL — LOW (ref 13–17)
IRON SATN MFR SERPL: 14 % — LOW (ref 16–55)
IRON SATN MFR SERPL: 36 UG/DL — LOW (ref 45–165)
KETONES UR-MCNC: NEGATIVE — SIGNIFICANT CHANGE UP
LEUKOCYTE ESTERASE UR-ACNC: NEGATIVE — SIGNIFICANT CHANGE UP
MAGNESIUM SERPL-MCNC: 1.8 MG/DL — SIGNIFICANT CHANGE UP (ref 1.6–2.6)
MCHC RBC-ENTMCNC: 20.3 PG — LOW (ref 27–34)
MCHC RBC-ENTMCNC: 30.6 GM/DL — LOW (ref 32–36)
MCV RBC AUTO: 66.3 FL — LOW (ref 80–100)
NITRITE UR-MCNC: NEGATIVE — SIGNIFICANT CHANGE UP
NRBC # BLD: 0 /100 WBCS — SIGNIFICANT CHANGE UP (ref 0–0)
PH UR: 6 — SIGNIFICANT CHANGE UP (ref 5–8)
PHOSPHATE SERPL-MCNC: 3.2 MG/DL — SIGNIFICANT CHANGE UP (ref 2.5–4.5)
PLATELET # BLD AUTO: 230 K/UL — SIGNIFICANT CHANGE UP (ref 150–400)
POTASSIUM SERPL-MCNC: 4.4 MMOL/L — SIGNIFICANT CHANGE UP (ref 3.5–5.3)
POTASSIUM SERPL-SCNC: 4.4 MMOL/L — SIGNIFICANT CHANGE UP (ref 3.5–5.3)
PROT UR-MCNC: NEGATIVE MG/DL — SIGNIFICANT CHANGE UP
RBC # BLD: 3.5 M/UL — LOW (ref 4.2–5.8)
RBC # FLD: 16.2 % — HIGH (ref 10.3–14.5)
SODIUM SERPL-SCNC: 137 MMOL/L — SIGNIFICANT CHANGE UP (ref 135–145)
SP GR SPEC: 1.01 — SIGNIFICANT CHANGE UP (ref 1–1.03)
TIBC SERPL-MCNC: 261 UG/DL — SIGNIFICANT CHANGE UP (ref 220–430)
TRANSFERRIN SERPL-MCNC: 211 MG/DL — SIGNIFICANT CHANGE UP (ref 200–360)
UIBC SERPL-MCNC: 225 UG/DL — SIGNIFICANT CHANGE UP (ref 110–370)
UROBILINOGEN FLD QL: 0.2 E.U./DL — SIGNIFICANT CHANGE UP
WBC # BLD: 5.1 K/UL — SIGNIFICANT CHANGE UP (ref 3.8–10.5)
WBC # FLD AUTO: 5.1 K/UL — SIGNIFICANT CHANGE UP (ref 3.8–10.5)

## 2023-04-15 PROCEDURE — 71045 X-RAY EXAM CHEST 1 VIEW: CPT | Mod: 26

## 2023-04-15 PROCEDURE — 99232 SBSQ HOSP IP/OBS MODERATE 35: CPT

## 2023-04-15 RX ORDER — HEPARIN SODIUM 5000 [USP'U]/ML
1200 INJECTION INTRAVENOUS; SUBCUTANEOUS
Qty: 25000 | Refills: 0 | Status: DISCONTINUED | OUTPATIENT
Start: 2023-04-15 | End: 2023-04-16

## 2023-04-15 RX ORDER — MAGNESIUM SULFATE 500 MG/ML
2 VIAL (ML) INJECTION ONCE
Refills: 0 | Status: COMPLETED | OUTPATIENT
Start: 2023-04-15 | End: 2023-04-15

## 2023-04-15 RX ADMIN — Medication 25 GRAM(S): at 17:03

## 2023-04-15 RX ADMIN — HEPARIN SODIUM 12 UNIT(S)/HR: 5000 INJECTION INTRAVENOUS; SUBCUTANEOUS at 11:15

## 2023-04-15 RX ADMIN — Medication 650 MILLIGRAM(S): at 12:15

## 2023-04-15 RX ADMIN — Medication 650 MILLIGRAM(S): at 17:03

## 2023-04-15 RX ADMIN — Medication 650 MILLIGRAM(S): at 06:31

## 2023-04-15 RX ADMIN — Medication 650 MILLIGRAM(S): at 11:15

## 2023-04-15 RX ADMIN — Medication 650 MILLIGRAM(S): at 18:03

## 2023-04-15 NOTE — PHYSICAL THERAPY INITIAL EVALUATION ADULT - TRANSFER SAFETY CONCERNS NOTED: SIT/STAND, REHAB EVAL
Bed height elevated to facilitate OOB transfers. Attempted 2x; during initial attempt. pt displayed difficulty to stand however able to clear bilateral ischial tuberosities off bed surface. During 2nd attempt, increased bed height elevation and patient was able to stand w/ crouched posture for pain guarding./decreased sequencing ability/decreased weight-shifting ability

## 2023-04-15 NOTE — PHYSICAL THERAPY INITIAL EVALUATION ADULT - PERTINENT HX OF CURRENT PROBLEM, REHAB EVAL
71yo man, undomiciled, hx schizophrenia, hx subsegmental PE (recent dx March 2023, poor adherence on Eliquis), who presents for 2-3 week history severe R hip/groin pain. Patient is poor historian however able to report severe pain symptoms in R hip/groin region for past 2-3 weeks, however does not directly respond when asked about any preceding mechanical trauma history. He walks with a walker at baseline, but unable to estimate a duration/chronicity of use. He was recently admitted to [facility] MICU in March for hypothermia and managed for septic shock without known source, and incidentally found to have b/l subsegmental PEs w/o heart strain, subsequently discharged on Eliquis. He reports taking only 1 tablet once a day, however admits to frequently missing doses and recalls last dose was approximately 2-3 days ago. He otherwise denies recent or active fever, chills, headaches, nausea, vomiting, chest pain, dyspnea on exertion, cough, sputum production, hemoptysis, lightheadedness, abdominal pain, genitourinary sx, acute extremity weakness.

## 2023-04-15 NOTE — PROGRESS NOTE ADULT - SUBJECTIVE AND OBJECTIVE BOX
INTERVAL HPI/OVERNIGHT EVENTS: LIZA o/n. Uncomplicated OR w/ return to floor per PACU policy. This AM reports pain is well controlled. He has not had any hematuria, hematochezia, or melena that he recalls. No hemoptysis. Overall feels well, wants to work with physical therapy.     VITAL SIGNS:  T(F): 98.3 (04-15-23 @ 09:42)  HR: 58 (04-15-23 @ 09:42)  BP: 107/59 (04-15-23 @ 09:42)  RR: 18 (04-15-23 @ 09:42)  SpO2: 98% (04-15-23 @ 09:42)  Wt(kg): --    PHYSICAL EXAM:      Constitutional: NAD, comfortable in bed  Respiratory: CTAB  Cardiovascular: S1 and S2, RRR, no M/G/R  Gastrointestinal: BS+, soft, NT/ND  Extremities: 1+ b/l LE edema, pitting   Vascular: 2+ peripheral pulses  Neurological: A/O x 3, no focal deficits  Psychiatric: Normal mood, normal affect  Musculoskeletal: 5/5 strength b/l upper and lower extremities  Skin: No rashes        MEDICATIONS  (STANDING):  acetaminophen     Tablet .. 650 milliGRAM(s) Oral every 6 hours  heparin  Infusion 1200 Unit(s)/Hr (12 mL/Hr) IV Continuous <Continuous>  lactated ringers. 1000 milliLiter(s) (100 mL/Hr) IV Continuous <Continuous>    MEDICATIONS  (PRN):  oxyCODONE    IR 5 milliGRAM(s) Oral every 4 hours PRN Moderate Pain (4 - 6)  oxyCODONE    IR 10 milliGRAM(s) Oral every 4 hours PRN Severe Pain (7 - 10)      Allergies    No Known Allergies    Intolerances        LABS:                        7.1    5.10  )-----------( 230      ( 15 Apr 2023 05:30 )             23.2     04-15    137  |  103  |  17  ----------------------------<  101<H>  4.4   |  26  |  1.02    Ca    8.6      15 Apr 2023 05:30  Phos  3.2     04-15  Mg     1.8     04-15      PTT - ( 15 Apr 2023 05:30 )  PTT:99.3 sec      RADIOLOGY & ADDITIONAL TESTS:

## 2023-04-15 NOTE — PHYSICAL THERAPY INITIAL EVALUATION ADULT - ADDITIONAL COMMENTS
Pt currently undomiciled - prefers to stay in front of [employer] (However did not state exact location). Primarily amb w/ RW (states having to give back RW to company (?) prior to [facility] admission). Denied falls within past 6 months

## 2023-04-15 NOTE — PROGRESS NOTE ADULT - SUBJECTIVE AND OBJECTIVE BOX
Ortho Progress Note     Procedure: Right Hip CRPP  Surgeon: Dr. Beal    Pt seen and examined at bedside. Pt comfortable without complaints, pain controlled  Denies CP, SOB, N/V, numbness/tingling     Vital Signs Last 24 Hrs  T(C): 36.3 (04-14-23 @ 11:30), Max: 36.9 (04-14-23 @ 07:48)  T(F): 97.3 (04-14-23 @ 11:30), Max: 97.3 (04-14-23 @ 11:30)  HR: 55 (04-14-23 @ 11:30) (53 - 79)  BP: 142/67 (04-14-23 @ 11:30) (125/57 - 144/74)  BP(mean): 91 (04-14-23 @ 11:00) (80 - 96)  RR: 18 (04-14-23 @ 11:30) (8 - 19)  SpO2: 98% (04-14-23 @ 11:30) (98% - 100%)  I&O's Summary    13 Apr 2023 07:01  -  14 Apr 2023 07:00  --------------------------------------------------------  IN: 0 mL / OUT: 450 mL / NET: -450 mL    14 Apr 2023 07:01  -  14 Apr 2023 12:32  --------------------------------------------------------  IN: 100 mL / OUT: 0 mL / NET: 100 mL        General: Pt Alert and oriented, NAD  DSG C/D/I - Aquacel to right hip. Overlying ice pack  Pulses: 2+ DP pulses palpable bilaterally. Skin wwp. Cap refill brisk.   Sensation: SILT to bilateral lower extremities  Motor: EHL/FHL/TA/GS 5/5 bilaterally                          8.2    3.00  )-----------( 297      ( 14 Apr 2023 05:42 )             27.1     04-14    141  |  106  |  26<H>  ----------------------------<  53<LL>  3.7   |  24  |  1.03    Ca    9.1      14 Apr 2023 05:42  Phos  3.3     04-14  Mg     2.0     04-14          A/P: 73yo Male s/p Right Hip CRPP on 4/14 w/ Dr Beal   - Stable  - Pain Control per primary team  - DVT ppx: deferred to primary team, but we recommend starting some form of DVT PPX on POD1 i/s/o known PE (diagnosed in 3/15/23)   - Post op abx: Ancef  - PT, WBS: WBAT     Ortho Pager [phone]

## 2023-04-15 NOTE — PROGRESS NOTE ADULT - PROBLEM SELECTOR PLAN 1
s/p IMN, POD 1. uncomplicated per orthopedics team and OR records  - pain control   - ICS, OOB to chair   - PT enzo

## 2023-04-15 NOTE — CONSULT NOTE ADULT - SUBJECTIVE AND OBJECTIVE BOX
Orthopaedic Surgery Consult Note    For Surgeon:    HPI:  72yMale with PMHx recent PE on 3/15/23 (d/c on eliquis however pt is poor historian and cannot say if he has been taking eliquis consistently), schizophrenia, undomiciled who comes in to ED w/ R hip pain for "2-4 days". Pt is unable to say if he sustained trauma or mech fall to R hip. States that he walks w/ a walker @ baseline but that he has been having increasing difficulty to do so in the past 2-3 days 2/2 R hip pain . Denies any numbness/tingling. Denies any head trauma.     Past Medical History    Past Surgical History    Allergies    Social History     Vital Signs Last 24 Hrs  T(C): 36.6 (13 Apr 2023 05:11), Max: 36.8 (12 Apr 2023 23:01)  T(F): 97.9 (13 Apr 2023 05:11), Max: 98.2 (12 Apr 2023 23:01)  HR: 54 (13 Apr 2023 05:11) (54 - 81)  BP: 126/77 (13 Apr 2023 05:11) (126/77 - 133/69)  BP(mean): --  RR: 18 (13 Apr 2023 05:11) (18 - 18)  SpO2: 98% (13 Apr 2023 05:11) (98% - 100%)    Parameters below as of 13 Apr 2023 05:11  Patient On (Oxygen Delivery Method): room air        Physical Exam:  General: AAOx3, NAD  R Hip: Shortened, externally rotated  Motor 5/5 TA/GS/EHL, Quad/Psoas Deferred 2/2 pain  SILT grossly SPN/DPN/Saph/Joshua/Tib  DP 2+    Imaging:   XR shows Garden 1 FNF     Plan  72yMale with R G1 FNF  for OR with Dr. Gruber on 4/13   - Admit to medicine service given comorbidities, recent PE, and poor management of current PE   - NPO for OR, IVF when NPO  - Pain Control  - DVT: SCDs, hold chemical ppx for OR  - Medical clearance/optimization  - pre-operative labs  - COVID  - Maintenance IVF  - F/u AM Labs  - Added on, consented    Mark Peacock, PGY-2  Orthopedic Surgery  
  Patient is a 72y old  Male who presents with a chief complaint of femoral neck fracture (15 Apr 2023 10:58)      HPI:  Mr. Mathur is a 73yo man, undomiciled, hx schizophrenia, hx subsegmental PE (recent dx March 2023, poor adherence on Eliquis), who presents for 2-3 week history severe R hip/groin pain. Patient is poor historian however able to report severe pain symptoms in R hip/groin region for past 2-3 weeks, however does not directly respond when asked about any preceding mechanical trauma history. He walks with a walker at baseline, but unable to estimate a duration/chronicity of use. He was recently admitted to [facility] MICU in March for hypothermia and managed for septic shock without known source, and incidentally found to have b/l subsegmental PEs w/o heart strain, subsequently discharged on Eliquis. He reports taking only 1 tablet once a day, however admits to frequently missing doses and recalls last dose was approximately 2-3 days ago. He otherwise denies recent or active fever, chills, headaches, nausea, vomiting, chest pain, dyspnea on exertion, cough, sputum production, hemoptysis, lightheadedness, abdominal pain, genitourinary sx, acute extremity weakness. (13 Apr 2023 09:50)    PAST MEDICAL & SURGICAL HISTORY:  GIB (gastrointestinal bleeding)      Current smoker      History of gastric surgery        MEDICATIONS  (STANDING):  acetaminophen     Tablet .. 650 milliGRAM(s) Oral every 6 hours  heparin  Infusion 1200 Unit(s)/Hr (12 mL/Hr) IV Continuous <Continuous>    MEDICATIONS  (PRN):  oxyCODONE    IR 5 milliGRAM(s) Oral every 4 hours PRN Moderate Pain (4 - 6)  oxyCODONE    IR 10 milliGRAM(s) Oral every 4 hours PRN Severe Pain (7 - 10)           FAMILY HISTORY:      CBC Full  -  ( 15 Apr 2023 05:30 )  WBC Count : 5.10 K/uL  RBC Count : 3.50 M/uL  Hemoglobin : 7.1 g/dL  Hematocrit : 23.2 %  Platelet Count - Automated : 230 K/uL  Mean Cell Volume : 66.3 fl  Mean Cell Hemoglobin : 20.3 pg  Mean Cell Hemoglobin Concentration : 30.6 gm/dL  Auto Neutrophil # : x  Auto Lymphocyte # : x  Auto Monocyte # : x  Auto Eosinophil # : x  Auto Basophil # : x  Auto Neutrophil % : x  Auto Lymphocyte % : x  Auto Monocyte % : x  Auto Eosinophil % : x  Auto Basophil % : x      04-15    137  |  103  |  17  ----------------------------<  101<H>  4.4   |  26  |  1.02    Ca    8.6      15 Apr 2023 05:30  Phos  3.2     04-15  Mg     1.8     04-15            Radiology :     < from: CT Hip No Cont, Right (04.13.23 @ 03:27) >  ACC: [id number] EXAM:  CT HIP ONLY RT   ORDERED BY: DIANE SAGASTUME     PROCEDURE DATE:  04/13/2023          INTERPRETATION:  Attending final report:    KARLIE Zarate MD, have reviewed the report below and the images   for this examination. I agree with the report below.    [facility] RADIOLOGIST PRELIMINARY REPORT      Initial report created on 4/13/2023 5:22:41 AM EDT  PROCEDURE INFORMATION:  Exam: CT Right Lower Extremity Without Contrast, Hip  Exam date and time: 4/13/2023 3:16 AM  Age: 72 years old  Clinical indication: Other: R/O R hip FX, new stepoff seen on XR    TECHNIQUE:  Imaging protocol: CT of the right lower extremity without contrast was  performed. Exam focused on the hip.    COMPARISON:  CR XR HIP WITH PELVIS 2 OR 3 VIEWS RIGHT 4/13/2023 1:37 AM    FINDINGS:  Bones/joints: Acute nondisplaced subcapital right femoral neck fracture.   No  other fracture seen. No dislocation. Moderate joint space narrowing in the  right hip. No lytic lesion or cortical erosion.  Soft tissues: Mild subcutaneous stranding lateral to the right hip,   possibly  contusion. No significant soft tissue hematoma.    Urinary bladder: Small right bladder diverticulum incidentally noted.    IMPRESSION:  1.   Acute nondisplaced subcapital right femoral neck fracture.  2.   Mild subcutaneous stranding lateral to the right hip, possibly   contusion.    < end of copied text >          REVIEW OF SYSTEMS:      CONSTITUTIONAL: No fever, weight loss, or fatigue  EYES: No eye pain, visual disturbances, or discharge  ENMT:  No difficulty hearing, tinnitus, vertigo; No sinus or throat pain  NECK: No pain or stiffness  BREASTS: No pain, masses, or nipple discharge  RESPIRATORY: No cough, wheezing, chills or hemoptysis; No shortness of breath  CARDIOVASCULAR: No chest pain, palpitations, dizziness, or leg swelling  GASTROINTESTINAL: No abdominal or epigastric pain. No nausea, vomiting, or hematemesis; No diarrhea or constipation. No melena or hematochezia.  GENITOURINARY: No dysuria, frequency, hematuria, or incontinence  NEUROLOGICAL: No headaches, memory loss, loss of strength, numbness, or tremors  SKIN: No itching, burning, rashes, or lesions   LYMPH NODES: No enlarged glands  ENDOCRINE: No heat or cold intolerance; No hair loss  MUSCULOSKELETAL:  per hpi   PSYCHIATRIC: No depression, anxiety, mood swings, or difficulty sleeping  HEME/LYMPH: No easy bruising, or bleeding gums  ALLERGY AND IMMUNOLOGIC: No hives or eczema  VASCULAR: no swelling , erythema          Vital Signs Last 24 Hrs  T(C): 36.8 (15 Apr 2023 09:42), Max: 36.9 (14 Apr 2023 21:43)  T(F): 98.3 (15 Apr 2023 09:42), Max: 98.5 (14 Apr 2023 21:43)  HR: 58 (15 Apr 2023 09:42) (56 - 63)  BP: 107/59 (15 Apr 2023 09:42) (105/60 - 120/64)  BP(mean): --  RR: 18 (15 Apr 2023 09:42) (18 - 18)  SpO2: 98% (15 Apr 2023 09:42) (98% - 100%)    Parameters below as of 15 Apr 2023 09:42  Patient On (Oxygen Delivery Method): room air            Physical Exam : 72 y o man lying comfortably in semi Miguel's position , awake , alert , no acute complaints     Head : normocephalic , atraumatic    Eyes : PERRLA , EOMI , no nystagmus , sclera anicteric    ENT : neg nasal discharge , uvula midline , no oropharyngeal erythema / exudate    Neck : supple , negative JVD , negative carotid bruits , no thyromegaly    Chest : CTA bilaterally     Cardiovascular : regular rate and rhythm     Abdomen : soft , non distended , non tender to palpation in all 4 quadrants ,  normal bowel sounds     Extremities : WWP , neg cyanosis /clubbing / edema      Musculoskeletal :   R hip aquacell        Neurologic Exam :    Alert and oriented  x 3      Motor Exam:          Right UE:               no focal weakness ,  > 3+/5 throughout                              Left UE:                 no focal weakness ,  > 3+/5 throughout        Right LE:                no focal weakness ,  > 3+/5 throughout        Left LE:                  no focal weakness ,  > 3+/5 throughout         Sensation :         intact to light touch x 4 extremities       DTR :                     biceps/brachioradialis : equal                              patella/ankle : equal           Gait :  not tested      PM&R Impression :     1) Acute nondisplaced subcapital right femoral neck fracture    2) s/p Right Hip CRPP on 4/14    3) WBAT       Recommendations / Plan :      1) Physical / Occupational therapy focusing on therapeutic exercises , equipment evaluation , bed mobility/transfer out of bed evaluation , progressive ambulation with assistive devices prn .    2) Current disposition plan recommendation  :    subacute rehab placement

## 2023-04-15 NOTE — PROGRESS NOTE ADULT - PROBLEM SELECTOR PLAN 2
recent hx of PE. AC held for OR. Drop in H/H however no overt signs of bleeding and hip dressing c/d/i. plan to cautiously resume heparin today and monitor H/H for any evidence of bleeding.   long term A/c plan pending H/H and d/c planning w/ social work

## 2023-04-15 NOTE — CONSULT NOTE ADULT - ASSESSMENT
IM     72 y o man, undomiciled, hx schizophrenia, hx subsegmental PE (recent dx March 2023, poor adherence on Eliquis), who presents for 2-3 week history severe R hip/groin pain. Patient is poor historian however able to report severe pain symptoms in R hip/groin region for past 2-3 weeks, found on imaging to have R femoral neck fracture.      Problem/Plan - 1:  ·  Problem: Fracture of femoral neck, right.   ·  Plan: s/p IMN, POD 1. uncomplicated per orthopedics team and OR records  - pain control   - ICS, OOB to chair   - PT eval.    Problem/Plan - 2:  ·  Problem: History of pulmonary embolism.   ·  Plan: recent hx of PE. AC held for OR. Drop in H/H however no overt signs of bleeding and hip dressing c/d/i. plan to cautiously resume heparin today and monitor H/H for any evidence of bleeding.   long term A/c plan pending H/H and d/c planning w/ social work.    Problem/Plan - 3:  ·  Problem: Microcytic anemia.   ·  Plan: microcytic anemia. suspect iron deficiency. possibly nutritional deficiencies however he does not recall last colonoscopy and has had poor follow up due to schizophrenia and lack of access to care.   - iron studies prior to transfusion   - will need to f/u with GI for colonoscopy.    Problem/Plan - 4:  ·  Problem: KENYATTA (acute kidney injury).   ·  Plan: resolved. likely prerenal.    Problem/Plan - 5:  ·  Problem: Hypoglycemia.   ·  Plan: resolved.    Problem/Plan - 6:  ·  Problem: Nutrition, metabolism, and development symptoms.   ·  Plan: F: 1L LR  E: replete PRN  N: NPO before procedure, regular diet post-procedure  GI ppx: c/w home protonix  DVT ppx: heparin gtt (held perioperatively)   Dispo: F.

## 2023-04-15 NOTE — PHYSICAL THERAPY INITIAL EVALUATION ADULT - MANUAL MUSCLE TESTING RESULTS, REHAB EVAL
Grossly 3/5 throughout BUE and LLE based on antigravity mobility. 2/5 throughout LLE hip flex and knee flex primarily limited by pain.

## 2023-04-16 LAB
ANION GAP SERPL CALC-SCNC: 8 MMOL/L — SIGNIFICANT CHANGE UP (ref 5–17)
APTT BLD: 147 SEC — CRITICAL HIGH (ref 27.5–35.5)
APTT BLD: 66.6 SEC — HIGH (ref 27.5–35.5)
APTT BLD: 71.9 SEC — HIGH (ref 27.5–35.5)
BUN SERPL-MCNC: 19 MG/DL — SIGNIFICANT CHANGE UP (ref 7–23)
CALCIUM SERPL-MCNC: 8.8 MG/DL — SIGNIFICANT CHANGE UP (ref 8.4–10.5)
CHLORIDE SERPL-SCNC: 103 MMOL/L — SIGNIFICANT CHANGE UP (ref 96–108)
CO2 SERPL-SCNC: 28 MMOL/L — SIGNIFICANT CHANGE UP (ref 22–31)
CREAT SERPL-MCNC: 0.96 MG/DL — SIGNIFICANT CHANGE UP (ref 0.5–1.3)
EGFR: 84 ML/MIN/1.73M2 — SIGNIFICANT CHANGE UP
GLUCOSE SERPL-MCNC: 91 MG/DL — SIGNIFICANT CHANGE UP (ref 70–99)
HCT VFR BLD CALC: 24.3 % — LOW (ref 39–50)
HGB BLD-MCNC: 7.7 G/DL — LOW (ref 13–17)
MAGNESIUM SERPL-MCNC: 2.1 MG/DL — SIGNIFICANT CHANGE UP (ref 1.6–2.6)
MCHC RBC-ENTMCNC: 21 PG — LOW (ref 27–34)
MCHC RBC-ENTMCNC: 31.7 GM/DL — LOW (ref 32–36)
MCV RBC AUTO: 66.4 FL — LOW (ref 80–100)
NRBC # BLD: 0 /100 WBCS — SIGNIFICANT CHANGE UP (ref 0–0)
PHOSPHATE SERPL-MCNC: 3.4 MG/DL — SIGNIFICANT CHANGE UP (ref 2.5–4.5)
PLATELET # BLD AUTO: 197 K/UL — SIGNIFICANT CHANGE UP (ref 150–400)
POTASSIUM SERPL-MCNC: 4.2 MMOL/L — SIGNIFICANT CHANGE UP (ref 3.5–5.3)
POTASSIUM SERPL-SCNC: 4.2 MMOL/L — SIGNIFICANT CHANGE UP (ref 3.5–5.3)
RBC # BLD: 3.66 M/UL — LOW (ref 4.2–5.8)
RBC # FLD: 16.2 % — HIGH (ref 10.3–14.5)
SODIUM SERPL-SCNC: 139 MMOL/L — SIGNIFICANT CHANGE UP (ref 135–145)
WBC # BLD: 4.7 K/UL — SIGNIFICANT CHANGE UP (ref 3.8–10.5)
WBC # FLD AUTO: 4.7 K/UL — SIGNIFICANT CHANGE UP (ref 3.8–10.5)

## 2023-04-16 PROCEDURE — 99232 SBSQ HOSP IP/OBS MODERATE 35: CPT

## 2023-04-16 RX ORDER — SENNA PLUS 8.6 MG/1
2 TABLET ORAL AT BEDTIME
Refills: 0 | Status: DISCONTINUED | OUTPATIENT
Start: 2023-04-16 | End: 2023-04-18

## 2023-04-16 RX ORDER — IRON SUCROSE 20 MG/ML
200 INJECTION, SOLUTION INTRAVENOUS EVERY 24 HOURS
Refills: 0 | Status: DISCONTINUED | OUTPATIENT
Start: 2023-04-16 | End: 2023-04-18

## 2023-04-16 RX ORDER — POLYETHYLENE GLYCOL 3350 17 G/17G
17 POWDER, FOR SOLUTION ORAL EVERY 24 HOURS
Refills: 0 | Status: DISCONTINUED | OUTPATIENT
Start: 2023-04-17 | End: 2023-04-18

## 2023-04-16 RX ORDER — HEPARIN SODIUM 5000 [USP'U]/ML
900 INJECTION INTRAVENOUS; SUBCUTANEOUS
Qty: 25000 | Refills: 0 | Status: DISCONTINUED | OUTPATIENT
Start: 2023-04-16 | End: 2023-04-17

## 2023-04-16 RX ORDER — ACETAMINOPHEN 500 MG
650 TABLET ORAL EVERY 6 HOURS
Refills: 0 | Status: DISCONTINUED | OUTPATIENT
Start: 2023-04-16 | End: 2023-04-18

## 2023-04-16 RX ADMIN — IRON SUCROSE 200 MILLIGRAM(S): 20 INJECTION, SOLUTION INTRAVENOUS at 10:32

## 2023-04-16 RX ADMIN — HEPARIN SODIUM 12 UNIT(S)/HR: 5000 INJECTION INTRAVENOUS; SUBCUTANEOUS at 05:44

## 2023-04-16 RX ADMIN — Medication 650 MILLIGRAM(S): at 06:50

## 2023-04-16 RX ADMIN — Medication 650 MILLIGRAM(S): at 05:43

## 2023-04-16 RX ADMIN — Medication 650 MILLIGRAM(S): at 00:26

## 2023-04-16 RX ADMIN — HEPARIN SODIUM 9 UNIT(S)/HR: 5000 INJECTION INTRAVENOUS; SUBCUTANEOUS at 13:53

## 2023-04-16 RX ADMIN — Medication 650 MILLIGRAM(S): at 01:09

## 2023-04-16 NOTE — PROGRESS NOTE ADULT - SUBJECTIVE AND OBJECTIVE BOX
INTERVAL EVENTS: LIZA    SUBJECTIVE / INTERVAL HPI: Patient seen and examined at bedside. Reports feeling better, mild pain of hip.    ROS: negative unless otherwise stated above.    VITAL SIGNS:  Vital Signs Last 24 Hrs  T(C): 37.5 (2023 12:36), Max: 38.1 (15 Apr 2023 16:03)  T(F): 99.5 (2023 12:36), Max: 100.6 (15 Apr 2023 16:03)  HR: 68 (2023 12:36) (57 - 77)  BP: 116/58 (2023 12:36) (112/55 - 124/80)  BP(mean): --  RR: 16 (2023 12:36) (16 - 18)  SpO2: 100% (2023 12:36) (98% - 100%)    Parameters below as of 2023 12:36  Patient On (Oxygen Delivery Method): room air          04-15-23 @ 07:01  -  23 @ 07:00  --------------------------------------------------------  IN: 0 mL / OUT: 300 mL / NET: -300 mL        PHYSICAL EXAM:    Constitutional: NAD, comfortable in bed  HEENT: slight chronic droop of R eyelid, mildly dry MM  Respiratory: CTA b/l , no accessory muscle use  Cardiovascular: S1 and S2, RRR, no M/G/R  Gastrointestinal: soft, NT/ND  Extremities: trace b/l LE edema  Vascular: 2+ radial pulses  Musculoskeletal: improved ROM of R leg  Neurological: A/O x 3, no focal deficits  Psychiatric: Normal mood, appropriate answers to questions    MEDICATIONS:  MEDICATIONS  (STANDING):  heparin  Infusion 900 Unit(s)/Hr (9 mL/Hr) IV Continuous <Continuous>  iron sucrose Injectable 200 milliGRAM(s) IV Push every 24 hours  senna 2 Tablet(s) Oral at bedtime    MEDICATIONS  (PRN):  acetaminophen     Tablet .. 650 milliGRAM(s) Oral every 6 hours PRN Temp greater or equal to 38C (100.4F), Mild Pain (1 - 3)      ALLERGIES:  Allergies    No Known Allergies    Intolerances        LABS:                        7.7    4.70  )-----------( 197      ( 2023 06:45 )             24.3     04-16    139  |  103  |  19  ----------------------------<  91  4.2   |  28  |  0.96    Ca    8.8      2023 06:45  Phos  3.4     04-16  Mg     2.1     04-16      PTT - ( 2023 11:58 )  PTT:147.0 sec  Urinalysis Basic - ( 15 Apr 2023 18:16 )    Color: Yellow / Appearance: Clear / S.010 / pH: x  Gluc: x / Ketone: NEGATIVE  / Bili: Negative / Urobili: 0.2 E.U./dL   Blood: x / Protein: NEGATIVE mg/dL / Nitrite: NEGATIVE   Leuk Esterase: NEGATIVE / RBC: x / WBC x   Sq Epi: x / Non Sq Epi: x / Bacteria: x      CAPILLARY BLOOD GLUCOSE          RADIOLOGY & ADDITIONAL TESTS: Reviewed.

## 2023-04-16 NOTE — PROVIDER CONTACT NOTE (CRITICAL VALUE NOTIFICATION) - ACTION/TREATMENT ORDERED:
As per MD, wait a few hours to send another urine sample
heparin discontinued for 1 hour
As per MD, hold heparin for 1 hour and then resume the heparin at 11ml/hr.

## 2023-04-16 NOTE — PROGRESS NOTE ADULT - PROBLEM SELECTOR PLAN 3
microcytic anemia. suspect iron deficiency. possibly nutritional deficiencies however he does not recall last colonoscopy and has had poor follow up due to schizophrenia and lack of access to care.    started iron supplementation given low/low normal ferritin w/ microcytic anemia  - no overt signs of bleeding. s/p 1 U PRBC.

## 2023-04-16 NOTE — PROGRESS NOTE ADULT - PROBLEM SELECTOR PLAN 1
s/p IMN, POD 2. uncomplicated per orthopedics team and OR records  Post op pain controlled. no evidence of bleeding  - ICS, OOB to chair  - PT recs JANET

## 2023-04-16 NOTE — PROGRESS NOTE ADULT - SUBJECTIVE AND OBJECTIVE BOX
Ortho Progress Note     Procedure: Right Hip CRPP  Surgeon: Dr. Beal    Pt seen and examined at bedside. Pt comfortable without complaints, pain controlled  Denies CP, SOB, N/V, numbness/tingling   Vital Signs Last 24 Hrs  T(C): 37.2 (15 Apr 2023 21:01), Max: 38.1 (15 Apr 2023 16:03)  T(F): 99 (15 Apr 2023 21:01), Max: 100.6 (15 Apr 2023 16:03)  HR: 77 (15 Apr 2023 21:01) (58 - 77)  BP: 123/61 (15 Apr 2023 21:01) (107/59 - 123/61)  BP(mean): --  RR: 18 (15 Apr 2023 21:01) (16 - 18)  SpO2: 98% (15 Apr 2023 21:01) (98% - 100%)    Parameters below as of 15 Apr 2023 21:01  Patient On (Oxygen Delivery Method): room air          General: Pt Alert and oriented, NAD  DSG C/D/I - Aquacel to right hip. Overlying ice pack  Pulses: 2+ DP pulses palpable bilaterally. Skin wwp. Cap refill brisk.   Sensation: SILT to bilateral lower extremities  Motor: EHL/FHL/TA/GS 5/5 bilaterally    LABS/RADIOLOGY RESULTS:                          7.1    5.10  )-----------( 230      ( 15 Apr 2023 05:30 )             23.2   04-15    137  |  103  |  17  ----------------------------<  101<H>  4.4   |  26  |  1.02    Ca    8.6      15 Apr 2023 05:30  Phos  3.2     04-15  Mg     1.8     04-15    PTT - ( 15 Apr 2023 22:00 )  PTT:91.2 secBlood Cultures    Urinalysis Basic - ( 15 Apr 2023 18:16 )    Color: Yellow / Appearance: Clear / S.010 / pH:   Gluc:  / Ketone: NEGATIVE  / Bili: Negative / Urobili: 0.2 E.U./dL   Blood:  / Protein: NEGATIVE mg/dL / Nitrite: NEGATIVE   Leuk Esterase: NEGATIVE / RBC:  / WBC    Sq Epi:  / Non Sq Epi:  / Bacteria:             A/P: 73yo Male s/p Right Hip CRPP on  w/ Dr Beal   - Stable  - Pain Control per primary team  - AC: hep gtt as per primary   - Post op abx: Ancef  - PT, WBS: WBAT   - please have patient follow up with Dr Rony Beal-ass when d/c from hospital for 1st postop visit     Ortho Pager [phone]

## 2023-04-16 NOTE — PROGRESS NOTE ADULT - PROBLEM SELECTOR PLAN 2
recent hx of PE. AC held for OR. Drop in H/H however no overt signs of bleeding and hip dressing c/d/i.   - monitor Hgb on heparin gtt today  long term A/c plan pending H/H and d/c planning w/ social work

## 2023-04-17 ENCOUNTER — TRANSCRIPTION ENCOUNTER (OUTPATIENT)
Age: 73
End: 2023-04-17

## 2023-04-17 LAB
ANION GAP SERPL CALC-SCNC: 5 MMOL/L — SIGNIFICANT CHANGE UP (ref 5–17)
APTT BLD: 60.7 SEC — HIGH (ref 27.5–35.5)
BLD GP AB SCN SERPL QL: NEGATIVE — SIGNIFICANT CHANGE UP
BUN SERPL-MCNC: 19 MG/DL — SIGNIFICANT CHANGE UP (ref 7–23)
CALCIUM SERPL-MCNC: 9 MG/DL — SIGNIFICANT CHANGE UP (ref 8.4–10.5)
CHLORIDE SERPL-SCNC: 102 MMOL/L — SIGNIFICANT CHANGE UP (ref 96–108)
CO2 SERPL-SCNC: 29 MMOL/L — SIGNIFICANT CHANGE UP (ref 22–31)
CREAT SERPL-MCNC: 1.02 MG/DL — SIGNIFICANT CHANGE UP (ref 0.5–1.3)
EGFR: 78 ML/MIN/1.73M2 — SIGNIFICANT CHANGE UP
GLUCOSE SERPL-MCNC: 89 MG/DL — SIGNIFICANT CHANGE UP (ref 70–99)
HCT VFR BLD CALC: 26 % — LOW (ref 39–50)
HGB BLD-MCNC: 8.1 G/DL — LOW (ref 13–17)
INR BLD: 1.06 — SIGNIFICANT CHANGE UP (ref 0.88–1.16)
MCHC RBC-ENTMCNC: 20.8 PG — LOW (ref 27–34)
MCHC RBC-ENTMCNC: 31.2 GM/DL — LOW (ref 32–36)
MCV RBC AUTO: 66.7 FL — LOW (ref 80–100)
NRBC # BLD: 0 /100 WBCS — SIGNIFICANT CHANGE UP (ref 0–0)
PLATELET # BLD AUTO: 187 K/UL — SIGNIFICANT CHANGE UP (ref 150–400)
POTASSIUM SERPL-MCNC: 4.2 MMOL/L — SIGNIFICANT CHANGE UP (ref 3.5–5.3)
POTASSIUM SERPL-SCNC: 4.2 MMOL/L — SIGNIFICANT CHANGE UP (ref 3.5–5.3)
PROTHROM AB SERPL-ACNC: 12.6 SEC — SIGNIFICANT CHANGE UP (ref 10.5–13.4)
RBC # BLD: 3.9 M/UL — LOW (ref 4.2–5.8)
RBC # FLD: 17 % — HIGH (ref 10.3–14.5)
RH IG SCN BLD-IMP: NEGATIVE — SIGNIFICANT CHANGE UP
SODIUM SERPL-SCNC: 136 MMOL/L — SIGNIFICANT CHANGE UP (ref 135–145)
WBC # BLD: 5.36 K/UL — SIGNIFICANT CHANGE UP (ref 3.8–10.5)
WBC # FLD AUTO: 5.36 K/UL — SIGNIFICANT CHANGE UP (ref 3.8–10.5)

## 2023-04-17 PROCEDURE — 90792 PSYCH DIAG EVAL W/MED SRVCS: CPT

## 2023-04-17 PROCEDURE — 99232 SBSQ HOSP IP/OBS MODERATE 35: CPT | Mod: GC

## 2023-04-17 RX ORDER — ARIPIPRAZOLE 15 MG/1
10 TABLET ORAL DAILY
Refills: 0 | Status: DISCONTINUED | OUTPATIENT
Start: 2023-04-17 | End: 2023-04-18

## 2023-04-17 RX ORDER — APIXABAN 2.5 MG/1
5 TABLET, FILM COATED ORAL EVERY 12 HOURS
Refills: 0 | Status: DISCONTINUED | OUTPATIENT
Start: 2023-04-17 | End: 2023-04-18

## 2023-04-17 RX ADMIN — ARIPIPRAZOLE 10 MILLIGRAM(S): 15 TABLET ORAL at 09:50

## 2023-04-17 RX ADMIN — POLYETHYLENE GLYCOL 3350 17 GRAM(S): 17 POWDER, FOR SOLUTION ORAL at 09:50

## 2023-04-17 RX ADMIN — Medication 650 MILLIGRAM(S): at 23:47

## 2023-04-17 RX ADMIN — APIXABAN 5 MILLIGRAM(S): 2.5 TABLET, FILM COATED ORAL at 09:49

## 2023-04-17 RX ADMIN — IRON SUCROSE 200 MILLIGRAM(S): 20 INJECTION, SOLUTION INTRAVENOUS at 09:49

## 2023-04-17 RX ADMIN — APIXABAN 5 MILLIGRAM(S): 2.5 TABLET, FILM COATED ORAL at 18:08

## 2023-04-17 NOTE — BH CONSULTATION LIAISON ASSESSMENT NOTE - CURRENT PASSIVE IDEATION:
Please set up an appointment for office visit  Also do not see an appointment to see Ob S today.  Patient has appointment to see them next week   None known

## 2023-04-17 NOTE — DISCHARGE NOTE PROVIDER - NSDCCPCAREPLAN_GEN_ALL_CORE_FT
PRINCIPAL DISCHARGE DIAGNOSIS  Diagnosis: Fracture of femoral neck, right  Assessment and Plan of Treatment: A hip fracture is a break in the upper portion of the femur (thighbone). Most hip fractures occur in elderly patients whose bones have become weakened by osteoporosis. Each year, more than 300,000 people in the [place] sustain a hip fracture. Most of these fractures occur in patients 65 years of age or older who are injured in household or community falls.Hip fractures can be very painful. For this reason, prompt surgical treatment is recommended. Treating the fracture and getting the patient out of bed as soon as possible will help prevent medical complications such as bed sores, blood clots, and pneumonia.    You fractured your right hip. Our orthopedic surgery team performed surgery by putting nails in your hip to fix the fracture. You should follow-up with your orthopedic surgeons within 1 week of discharge from the hospital. You will be going to a rehabilitation center that will help you regain strength.

## 2023-04-17 NOTE — BH CONSULTATION LIAISON ASSESSMENT NOTE - CURRENT MEDICATION
MEDICATIONS  (STANDING):  apixaban 5 milliGRAM(s) Oral every 12 hours  ARIPiprazole 10 milliGRAM(s) Oral daily  iron sucrose Injectable 200 milliGRAM(s) IV Push every 24 hours  polyethylene glycol 3350 17 Gram(s) Oral every 24 hours  senna 2 Tablet(s) Oral at bedtime    MEDICATIONS  (PRN):  acetaminophen     Tablet .. 650 milliGRAM(s) Oral every 6 hours PRN Temp greater or equal to 38C (100.4F), Mild Pain (1 - 3)

## 2023-04-17 NOTE — PROGRESS NOTE ADULT - SUBJECTIVE AND OBJECTIVE BOX
Internal Medicine Progress Note  Sherie Hastings, PGY-1    ******INCOMPLETE******    OVERNIGHT EVENTS/INTERVAL HPI:    OBJECTIVE:  Vital Signs Last 24 Hrs  T(C): 36.9 (2023 10:01), Max: 37.6 (2023 20:51)  T(F): 98.4 (2023 10:01), Max: 99.6 (2023 20:51)  HR: 57 (2023 10:01) (56 - 61)  BP: 117/62 (2023 10:01) (112/62 - 117/62)  BP(mean): --  RR: 17 (2023 10:01) (17 - 18)  SpO2: 97% (2023 10:) (97% - 100%)    Parameters below as of 2023 10:01  Patient On (Oxygen Delivery Method): room air      I&O's Detail    2023 07:01  -  2023 15:29  --------------------------------------------------------  IN:    Heparin: 18 mL  Total IN: 18 mL    OUT:  Total OUT: 0 mL    Total NET: 18 mL        Physical Exam:  GENERAL: Awake, alert and interactive, no acute distress, appears comfortable  NEURO: A&Ox4, no focal deficits, 5/5 strength in all ext, reflexes 2+ throughout, CN 2-12 intact  HEENT: Normocephalic, atraumatic, no conjunctivitis or scleral icterus, oral mucosa moist, no oral lesions noted  NECK: Supple, no LAD, no JVD, thyroid not palpable  CARDIAC: Regular rate and rhythm, +S1/S2, no murmurs/rubs/gallops  PULM: Breathing comfortably on RA, clear to auscultation bilaterally, no wheezes/rales/rhonchi  ABDOMEN: Soft, nontender, nondistended, +bs, no hepatosplenomegaly, no rebound tenderness or fluid wave, no CVA tenderness  : Deferred  MSK: Range of motion grossly intact, no back tenderness  SKIN: Warm and dry, no rashes, lesions  VASC: Cap refil < 2 sec, 2+ peripheral pulses, no edema, no LE tenderness  Psych: Appropriate affect    Medications:  MEDICATIONS  (STANDING):  apixaban 5 milliGRAM(s) Oral every 12 hours  ARIPiprazole 10 milliGRAM(s) Oral daily  iron sucrose Injectable 200 milliGRAM(s) IV Push every 24 hours  polyethylene glycol 3350 17 Gram(s) Oral every 24 hours  senna 2 Tablet(s) Oral at bedtime    MEDICATIONS  (PRN):  acetaminophen     Tablet .. 650 milliGRAM(s) Oral every 6 hours PRN Temp greater or equal to 38C (100.4F), Mild Pain (1 - 3)      Labs:                        8.1    5.36  )-----------( 187      ( 2023 07:39 )             26.0     04-17    136  |  102  |  19  ----------------------------<  89  4.2   |  29  |  1.02    Ca    9.0      2023 07:39  Phos  3.4     04-16  Mg     2.1     04-16      PT/INR - ( 2023 07:39 )   PT: 12.6 sec;   INR: 1.06          PTT - ( 2023 07:39 )  PTT:60.7 sec    Urinalysis Basic - ( 15 Apr 2023 18:16 )    Color: Yellow / Appearance: Clear / S.010 / pH: x  Gluc: x / Ketone: NEGATIVE  / Bili: Negative / Urobili: 0.2 E.U./dL   Blood: x / Protein: NEGATIVE mg/dL / Nitrite: NEGATIVE   Leuk Esterase: NEGATIVE / RBC: x / WBC x   Sq Epi: x / Non Sq Epi: x / Bacteria: x      COVID-19 PCR: Negative (2023 05:25)  SARS-CoV-2: NotDetec (15 Mar 2023 11:17)      Radiology: Reviewed OVERNIGHT EVENTS/INTERVAL HPI: No acute events overnight. Pt examined at bedside, stated he feels well and is not currently experiencing hip/groin pain. Was able to stand up with PT yesterday. Denies fever, chills, dyspnea, weakness/numbness.     OBJECTIVE:  Vital Signs Last 24 Hrs  T(C): 36.9 (2023 10:01), Max: 37.6 (2023 20:51)  T(F): 98.4 (2023 10:01), Max: 99.6 (2023 20:51)  HR: 57 (2023 10:) (56 - 61)  BP: 117/62 (2023 10:01) (112/62 - 117/62)  BP(mean): --  RR: 17 (2023 10:01) (17 - 18)  SpO2: 97% (2023 10:01) (97% - 100%)    Parameters below as of 2023 10:01  Patient On (Oxygen Delivery Method): room air      I&O's Detail    2023 07:01  -  2023 15:29  --------------------------------------------------------  IN:    Heparin: 18 mL  Total IN: 18 mL    OUT:  Total OUT: 0 mL    Total NET: 18 mL    PHYSICAL EXAM:  General: NAD, laying in bed, appears comfortable  HEENT: +1-2cm diameter well-circumscribed raised nodule on center of forehead, no conjunctival injection, EOMI, MMM  Neck: supple, no JVD  Cardio: RRR, +S1/S2, no M/R/G  Resp: lungs CTAB, no cough/wheezes/rales/rhonchi  Abdo: soft, NT, ND, +BS, no organomegaly or palpable mass    Extremities: WWP, +b/l 1+ LE edema below the knees otherwise no cyanosis/clubbing   Vasc: 2+ radial and DP pulses b/l  Neuro: A&Ox3  Psych: speech non-pressured  Skin: dry, intact, no visible jaundice   MSK: no joint swelling    Medications:  MEDICATIONS  (STANDING):  apixaban 5 milliGRAM(s) Oral every 12 hours  ARIPiprazole 10 milliGRAM(s) Oral daily  iron sucrose Injectable 200 milliGRAM(s) IV Push every 24 hours  polyethylene glycol 3350 17 Gram(s) Oral every 24 hours  senna 2 Tablet(s) Oral at bedtime    MEDICATIONS  (PRN):  acetaminophen     Tablet .. 650 milliGRAM(s) Oral every 6 hours PRN Temp greater or equal to 38C (100.4F), Mild Pain (1 - 3)      Labs:                        8.1    5.36  )-----------( 187      ( 2023 07:39 )             26.0     04-17    136  |  102  |  19  ----------------------------<  89  4.2   |  29  |  1.02    Ca    9.0      2023 07:39  Phos  3.4     04-16  Mg     2.1     04-16      PT/INR - ( 2023 07:39 )   PT: 12.6 sec;   INR: 1.06          PTT - ( 2023 07:39 )  PTT:60.7 sec    Urinalysis Basic - ( 15 Apr 2023 18:16 )    Color: Yellow / Appearance: Clear / S.010 / pH: x  Gluc: x / Ketone: NEGATIVE  / Bili: Negative / Urobili: 0.2 E.U./dL   Blood: x / Protein: NEGATIVE mg/dL / Nitrite: NEGATIVE   Leuk Esterase: NEGATIVE / RBC: x / WBC x   Sq Epi: x / Non Sq Epi: x / Bacteria: x      COVID-19 PCR: Negative (2023 05:25)  SARS-CoV-2: NotDetec (15 Mar 2023 11:17)      Radiology: Reviewed

## 2023-04-17 NOTE — BH CONSULTATION LIAISON ASSESSMENT NOTE - NSICDXBHSECONDARYDX_PSY_ALL_CORE
Suspected deep vein thrombosis (DVT)   [id number]  Preop examination   Z01.818  Fracture of femoral neck, right   S72.001A  Nutrition, metabolism, and development symptoms   R63.8  History of pulmonary embolism   Z86.711  KENYATTA (acute kidney injury)   N17.9  Hypoglycemia   E16.2  Microcytic anemia   D50.9

## 2023-04-17 NOTE — PROGRESS NOTE ADULT - PROBLEM SELECTOR PLAN 4
F: none  E: replete PRN  N:  regular diet post  GI ppx: c/w home protonix  DVT ppx: heparin gtt F: none  E: replete PRN  N:  regular diet   GI ppx: c/w home protonix  DVT ppx: eliquis

## 2023-04-17 NOTE — PROGRESS NOTE ADULT - PROBLEM SELECTOR PLAN 1
s/p IMN, POD 2. uncomplicated per orthopedics team and OR records  Post op pain controlled. no evidence of bleeding  - ICS, OOB to chair  - PT recs JANET s/p IMN. uncomplicated per orthopedics team and OR records  Post op pain controlled. no evidence of bleeding  Plan:  - ICS, OOB to chair  - PT recs JANET

## 2023-04-17 NOTE — PROGRESS NOTE ADULT - SUBJECTIVE AND OBJECTIVE BOX
Ortho Progress Note     Procedure: Right Hip CRPP  Surgeon: Dr. Beal    Pt seen and examined at bedside. Pt comfortable without complaints, pain controlled. Did not want to work w/ PT yday   Denies CP, SOB, N/V, numbness/tingling     Vital Signs Last 24 Hrs  T(C): 37.3 (2023 05:40), Max: 37.6 (2023 20:51)  T(F): 99.2 (2023 05:40), Max: 99.6 (2023 20:51)  HR: 56 (2023 05:40) (56 - 68)  BP: 112/62 (2023 05:40) (94/54 - 116/58)  BP(mean): --  RR: 18 (2023 05:40) (16 - 18)  SpO2: 100% (2023 05:40) (97% - 100%)    Parameters below as of 2023 05:40  Patient On (Oxygen Delivery Method): room air        General: Pt Alert and oriented, NAD  DSG C/D/I - Aquacel to right hip.   Pulses: 2+ DP pulses palpable bilaterally. Skin wwp. Cap refill brisk.   Sensation: SILT to bilateral lower extremities  Motor: EHL/FHL/TA/GS 5/5 bilaterally    LABS/RADIOLOGY RESULTS:                LABS/RADIOLOGY RESULTS:                          7.7    4.70  )-----------( 197      ( 2023 06:45 )             24.3   04-16    139  |  103  |  19  ----------------------------<  91  4.2   |  28  |  0.96    Ca    8.8      2023 06:45  Phos  3.4     -  Mg     2.1     04-16    PTT - ( 2023 21:41 )  PTT:71.9 secBlood Cultures    Blood Culture--   04-15 @ 18:16    Results  No growth at 1 day.    Organism--    Organism ID--    Urine Culture   04-15 @ 18:16    --       Results  No growth at 1 day.    Organism--    Organism ID--  Urinalysis Basic - ( 15 Apr 2023 18:16 )    Color: Yellow / Appearance: Clear / S.010 / pH:   Gluc:  / Ketone: NEGATIVE  / Bili: Negative / Urobili: 0.2 E.U./dL   Blood:  / Protein: NEGATIVE mg/dL / Nitrite: NEGATIVE   Leuk Esterase: NEGATIVE / RBC:  / WBC    Sq Epi:  / Non Sq Epi:  / Bacteria:       A/P: 73yo Male s/p Right Hip CRPP on  w/ Dr Beal   - Jose  - Pain Control per primary team  - AC: hep gtt as per primary   - Post op abx: Ancef  - PT, WBS: WBAT   - please have patient follow up with Dr Rony Beal-Bennett when d/c from hospital for 1st postop visit     Ortho Pager [phone]

## 2023-04-17 NOTE — DISCHARGE NOTE PROVIDER - HOSPITAL COURSE
#Discharge: do not delete    Patient is __ yo M/F with past medical history of _____, presented with _____, found to have _____      Problem List/Main Diagnoses:     New medications/therapies:   New lines/hardware:  Labs to be followed outpatient:   Exam to be followed outpatient:     Discharge plan: discharge to ______    Physical Exam Upon Discharge:     #Discharge: do not delete    73yo male currently undomiciled with PMHx schizophrenia, hx subsegmental PE (3/2023, poor adherence on Eliquis), who presents for 2-3 week history severe R hip/groin pain s/p ground level mechanical fall on the street. to have R femoral neck fracture, s/p subcutaneous nail pinning with Ortho team. Pending JANET.    Problem List/Main Diagnoses:   #Fracture of femoral neck, right.   XRay, Hip: nondisplaced R femoral neck fracture  CT Hip: Acute nondisplaced subcapital right femoral neck fracture. Mild subcutaneous stranding lateral to the right hip, possibly contusion.  Underwent intramedullary nailing of R hip with orthopedic surgery. Uncomplicated per orthopedics team and OR records. Post op pain controlled.   Plan:  - PT recs JANET  - Tylenol for pain  - Weight bearing as tolerated   - follow-up with orthopedic surgery Dr. Rony Martinez-Bennett within 1 week for post-discharge follow-up    #History of pulmonary embolism.   Recent hx of subsegmental PE 3/2023, on Eliquis.   Plan:  - c/w Eliquis 5mg BID    #Schizophrenia  On home Abilify 10mg qd  Plan:  - c/w home Abilify 10mg daily    #LE Edema  Bilateral LE Dopplers: negative  LIZA    #Microcytic anemia.   Total Fe low: 36, TIBC WNL, %Sat low: 14, ferritin low: 28.   Started iron supplementation given low/low normal ferritin w/ microcytic anemia. No overt signs of bleeding.   Received 1 U PRBC.  **If patient develops dark stools, it may be due to iron supplementation**  Plan:  - start ferrous sulfate 325mg qod   - c/w miralax 17g qd  - c/w senna 2 tabs qhs    New medications/therapies: None  New lines/hardware: nail pinning R hip  Exam to be followed outpatient: Orthopedic surgery, Primary Care    Discharge plan: discharge to [facility]    Physical Exam Upon Discharge:    T(C): 36.6 (04-18-23 @ 05:48), Max: 37.2 (04-17-23 @ 21:26)  HR: 62 (04-18-23 @ 05:48) (61 - 62)  BP: 123/64 (04-18-23 @ 05:48) (106/52 - 123/64)  RR: 18 (04-18-23 @ 05:48) (18 - 18)  SpO2: 100% (04-18-23 @ 05:48) (96% - 100%)    PHYSICAL EXAM:  General: NAD, laying in bed, appears comfortable  HEENT: no conjunctival injection, EOMI, MMM  Neck: supple, no JVD  Cardio: RRR, +S1/S2, no M/R/G  Resp: lungs CTAB, no cough/wheezes/rales/rhonchi  Abdo: soft, NT, ND, +BS, no organomegaly or palpable mass    Extremities: WWP, +b/l 1+ LE edema below the knees otherwise no cyanosis/clubbing   Vasc: 2+ radial and DP pulses b/l  Neuro: A&Ox3  Psych: speech non-pressured, tangential thoughts  Skin: very dry, intact, no visible jaundice      #Discharge: do not delete    73yo male currently undomiciled with PMHx schizophrenia, hx subsegmental PE (3/2023, poor adherence on Eliquis), who presents for 2-3 week history severe R hip/groin pain s/p ground level mechanical fall on the street. to have R femoral neck fracture, s/p subcutaneous nail pinning with Ortho team. Pending JANET.    Problem List/Main Diagnoses:   #Fracture of femoral neck, right.   XRay, Hip: nondisplaced R femoral neck fracture  CT Hip: Acute nondisplaced subcapital right femoral neck fracture. Mild subcutaneous stranding lateral to the right hip, possibly contusion.  Underwent intramedullary nailing of R hip with orthopedic surgery. Uncomplicated per orthopedics team and OR records. Post op pain controlled.   Plan:  - PT recs [facility]  - Tylenol for pain  - Weight bearing as tolerated   - follow-up with orthopedic surgery Dr. Rony Martinez-Bennett within 1 week for post-discharge follow-up    #History of pulmonary embolism.   Recent hx of subsegmental PE 3/2023, on Eliquis.   Plan:  - c/w Eliquis 5mg BID    #Schizophrenia  On home Abilify 10mg qd  Plan:  - c/w home Abilify 10mg daily    #GERD  On home omeprazole 20mg ER  Plan:  - c/w home omeprazole    #LE Edema  Bilateral LE Dopplers: negative  LIZA    #Microcytic anemia.   Total Fe low: 36, TIBC WNL, %Sat low: 14, ferritin low: 28.   Started iron supplementation given low/low normal ferritin w/ microcytic anemia. No overt signs of bleeding.   Received 1 U PRBC.  **If patient develops dark stools, it may be due to iron supplementation**  Plan:  - start ferrous sulfate 325mg every other day  - c/w miralax 17g qd  - c/w senna 2 tabs qhs    #Tobacco Use Disorder  Pt has hx of daily tobacco use. Nicotine patch 21mg used inpatient.  Plan:  - c/w nicotine patch    New medications/therapies: None  New lines/hardware: nail pinning R hip  Exam to be followed outpatient: Orthopedic surgery, Primary Care    Discharge plan: discharge to [facility]    Physical Exam Upon Discharge:    T(C): 36.6 (04-18-23 @ 05:48), Max: 37.2 (04-17-23 @ 21:26)  HR: 62 (04-18-23 @ 05:48) (61 - 62)  BP: 123/64 (04-18-23 @ 05:48) (106/52 - 123/64)  RR: 18 (04-18-23 @ 05:48) (18 - 18)  SpO2: 100% (04-18-23 @ 05:48) (96% - 100%)    PHYSICAL EXAM:  General: NAD, laying in bed, appears comfortable  HEENT: no conjunctival injection, EOMI, MMM  Neck: supple, no JVD  Cardio: RRR, +S1/S2, no M/R/G  Resp: lungs CTAB, no cough/wheezes/rales/rhonchi  Abdo: soft, NT, ND, +BS, no organomegaly or palpable mass    Extremities: WWP, +b/l 1+ LE edema below the knees otherwise no cyanosis/clubbing   Vasc: 2+ radial and DP pulses b/l  Neuro: A&Ox3  Psych: speech non-pressured, tangential thoughts  Skin: very dry, intact, no visible jaundice      #Discharge: do not delete    73yo male currently undomiciled with PMHx schizophrenia, hx subsegmental PE (3/2023, poor adherence on Eliquis), who presents for 2-3 week history severe R hip/groin pain s/p ground level mechanical fall on the street. to have R femoral neck fracture, s/p subcutaneous nail pinning with Ortho team. Pending JANET.    Problem List/Main Diagnoses:   #Fracture of femoral neck, right.   XRay, Hip: nondisplaced R femoral neck fracture  CT Hip: Acute nondisplaced subcapital right femoral neck fracture. Mild subcutaneous stranding lateral to the right hip, possibly contusion.  Underwent intramedullary nailing of R hip with orthopedic surgery. Uncomplicated per orthopedics team and OR records. Post op pain controlled.   Plan:  - PT recs [facility]  - Tylenol for pain  - Weight bearing as tolerated   - follow-up with orthopedic surgery Dr. Rony Martinez-Bennett within 1 week for post-discharge follow-up    #History of pulmonary embolism.   Recent hx of subsegmental PE 3/2023, on Eliquis.   Plan:  - c/w Eliquis 5mg BID    #Schizophrenia  On home Abilify 10mg qd  Plan:  - c/w home Abilify 10mg daily    #GERD  On home omeprazole 20mg ER  Plan:  - c/w home omeprazole    #LE Edema  Bilateral LE Dopplers: negative  LIZA    #Microcytic anemia.   Total Fe low: 36, TIBC WNL, %Sat low: 14, ferritin low: 28.   Started iron supplementation given low/low normal ferritin w/ microcytic anemia. No overt signs of bleeding.   Received 1 U PRBC.  **If patient develops dark stools, it may be due to iron supplementation**  Plan:  - start ferrous sulfate 325mg every other day  - c/w miralax 17g qd  - c/w senna 2 tabs qhs    #Tobacco Use Disorder  Pt has hx of daily tobacco use. Nicotine patch 21mg used inpatient.  Plan:  - c/w nicotine patch    New medications/therapies: None  New lines/hardware: nail pinning R hip  Exam to be followed outpatient: Orthopedic surgery, Primary Care    Discharge plan: discharge to [facility]    Physical Exam Upon Discharge:       #Discharge: do not delete    71yo male currently undomiciled with PMHx schizophrenia, hx subsegmental PE (3/2023, poor adherence on Eliquis), who presents for 2-3 week history severe R hip/groin pain s/p ground level mechanical fall on the street. to have R femoral neck fracture, s/p subcutaneous nail pinning with Ortho team. Pending JANET.    Problem List/Main Diagnoses:   #Fracture of femoral neck, right.   XRay, Hip: nondisplaced R femoral neck fracture  CT Hip: Acute nondisplaced subcapital right femoral neck fracture. Mild subcutaneous stranding lateral to the right hip, possibly contusion.  Underwent intramedullary nailing of R hip with orthopedic surgery. Uncomplicated per orthopedics team and OR records. Post op pain controlled.   Plan:  - PT recs JANET  - Tylenol for pain  - Weight bearing as tolerated   - follow-up with orthopedic surgery within 1 week for post-discharge follow-up    #History of pulmonary embolism.   Recent hx of subsegmental PE 3/2023, on Eliquis.   Plan:  - c/w Eliquis 5mg BID    #Schizophrenia  On home Abilify 10mg qd  Plan:  - c/w home Abilify 10mg daily    #GERD  On home omeprazole 20mg ER  Plan:  - c/w home omeprazole    #LE Edema  Bilateral LE Dopplers: negative  LIZA    #Microcytic anemia.   Total Fe low: 36, TIBC WNL, %Sat low: 14, ferritin low: 28.   Started iron supplementation given low/low normal ferritin w/ microcytic anemia. No overt signs of bleeding.   Received 1 U PRBC.  **If patient develops dark stools, it may be due to iron supplementation**  Plan:  - start ferrous sulfate 325mg every other day  - c/w miralax 17g qd  - c/w senna 2 tabs qhs    #Tobacco Use Disorder  Pt has hx of daily tobacco use. Nicotine patch 21mg used inpatient.  Plan:  - c/w nicotine patch    New medications/therapies: None  New lines/hardware: nail pinning R hip  Exam to be followed outpatient: Orthopedic surgery, Primary Care    Discharge plan: discharge to [facility]    Physical Exam Upon Discharge:    T(C): 36.7 (04-18-23 @ 13:42), Max: 37.2 (04-17-23 @ 21:26)  HR: 70 (04-18-23 @ 13:42) (61 - 70)  BP: 134/64 (04-18-23 @ 13:42) (106/52 - 134/64)  RR: 18 (04-18-23 @ 13:42) (18 - 18)  SpO2: 100% (04-18-23 @ 13:42) (96% - 100%)    PHYSICAL EXAM:  General: NAD, laying in bed, appears comfortable  HEENT: +1-2cm diameter well-circumscribed raised nodule on center of forehead, no conjunctival injection, EOMI, MMM  Neck: supple, no JVD  Cardio: RRR, +S1/S2, no M/R/G  Resp: lungs CTAB, no cough/wheezes/rales/rhonchi  Abdo: soft, NT, ND, +BS, no organomegaly or palpable mass    Extremities: WWP, +b/l 1+ LE edema below the knees otherwise no cyanosis/clubbing   Vasc: 2+ radial and DP pulses b/l  Neuro: A&Ox3  Psych: speech non-pressured  Skin: dry, intact, no visible jaundice   MSK: no joint swelling

## 2023-04-17 NOTE — DISCHARGE NOTE PROVIDER - NSDCMRMEDTOKEN_GEN_ALL_CORE_FT
ARIPiprazole 10 mg oral tablet: 1 tab(s) orally once a day  Eliquis 5 mg oral tablet: 1 tab(s) orally 2 times a day   Multiple Vitamins oral tablet: 1 tab(s) orally once a day  nicotine 21 mg/24 hr transdermal film, extended release: 21 mg/24h transdermal once a day   omeprazole 20 mg oral delayed release capsule: 1 cap(s) orally once a day    acetaminophen 325 mg oral tablet: 2 tab(s) orally every 6 hours As needed Temp greater or equal to 38C (100.4F), Mild Pain (1 - 3)  ARIPiprazole 10 mg oral tablet: 1 tab(s) orally once a day  Eliquis 5 mg oral tablet: 1 tab(s) orally 2 times a day   ferrous sulfate 325 mg (65 mg elemental iron) oral tablet: 1 tab(s) orally every other day Take every other day  Multiple Vitamins oral tablet: 1 tab(s) orally once a day  nicotine 21 mg/24 hr transdermal film, extended release: 21 mg/24h transdermal once a day   omeprazole 20 mg oral delayed release capsule: 1 cap(s) orally once a day   polyethylene glycol 3350 oral powder for reconstitution: 17 gram(s) orally every 24 hours  senna leaf extract oral tablet: 2 tab(s) orally once a day (at bedtime)

## 2023-04-17 NOTE — BH CONSULTATION LIAISON ASSESSMENT NOTE - NS_RISKASSESSMENTINTER_PSY_ALL_CORE
Assessment/Plan:   Diagnoses and all orders for this visit:    Sleep disturbance  - not true insomnia  - discussed sleep hygiene and advised to avoid fluids 2hrs prior to bed   - (+) increased stress as started new job after nearly 2hrs and has to commute 1hr each way  - f/u PRN - pt aware and agreeable     BMI 27 0-27 9,adult  -     Ambulatory Referral to Nutrition Services; Future  - BMI 27  - discussed diet and encouraged exercise  - educated that it takes 3500 ledy to lose 1lb  - advised to cut down on carbs, 5 servings of fruits/veggies/day, increase water intake (65-80oz/water/day)   - appropriate weight loss goal for women = 0 5-1lb/week  - per the [employer] Association - 150mins/exercise/week, but to lose and maintain weight 200-300mins/exercise/week           Subjective:    Patient ID: Felipa Hernandez is a 47 y o  female  HPI   - (+) "insomnia is getting worse"  - falls asleep (generally btw 10-11pm), gets up to use bathroom (around 130-2am) and cannot fall back asleep  - has to get up at 530am   - eats dinner btw 7-8pm   - has her last drink (water/ice-tea) at 9pm   - (+) lack of energy and does not exercise - concerned about her weight and wants referral to Nutritionist         The following portions of the patient's history were reviewed and updated as appropriate: allergies, current medications, past family history, past medical history, past social history, past surgical history and problem list     Review of Systems  as per HPI    Objective:  /60   Pulse 89   Resp 16   Ht 5' 8" (1 727 m)   Wt 80 7 kg (178 lb)   LMP 10/05/2021 (Exact Date)   SpO2 98%   BMI 27 06 kg/m²    Physical Exam  Vitals reviewed  Constitutional:       General: She is not in acute distress  Appearance: Normal appearance  She is not ill-appearing, toxic-appearing or diaphoretic  HENT:      Head: Normocephalic and atraumatic        Right Ear: External ear normal       Left Ear: External ear normal    Eyes: General: No scleral icterus  Right eye: No discharge  Left eye: No discharge  Extraocular Movements: Extraocular movements intact  Conjunctiva/sclera: Conjunctivae normal    Pulmonary:      Effort: Pulmonary effort is normal    Musculoskeletal:         General: Normal range of motion  Cervical back: Normal range of motion  Neurological:      General: No focal deficit present  Mental Status: She is alert and oriented to person, place, and time  Psychiatric:         Mood and Affect: Mood normal          Behavior: Behavior normal          BMI Counseling: Body mass index is 27 06 kg/m²  The BMI is above normal  Nutrition recommendations include 3-5 servings of fruits/vegetables daily  Exercise recommendations include exercising 3-5 times per week  Patient referred to nutritionist due to patient being overweight  Low Acute Suicide Risk

## 2023-04-17 NOTE — PROGRESS NOTE ADULT - ATTENDING COMMENTS
#R FNF s/p subcutaneous nail pinning  - s/p IMN. Post op pain controlled. no evidence of bleeding  - ICS, OOB to chair  - PT recs JANET     #microcytic anemia  - started iron supplementation given low/low normal ferritin w/ microcytic anemia  - no overt signs of bleeding. Hgb 7.1->7.7 after 1 U PRBC. monitor Hgb tomorrow - if persistently dropping may need GI evaluation for risk stratification of A/C w/ recent PE.     #subsegmental PE  - c/w hep gtt, pending h/h tomorrow     #KENYATTA, resolved.    rest of plan as above.    dispo: JANET planning
Patient seen and examined at bedside.  Very tangential, not able to fully describe recent surgery, understand recommendation for JANET    #R FNF s/p subcutaneous nail pinning  - s/p IMN. Post op pain controlled. no evidence of bleeding  - ICS, OOB to chair  - PT recs JANET     #Schizophrenia  - restarted home abilify today  -psych eval given tangential thought process, no clear ability to explain reasoning for JANET, describe recent hip surgery    #microcytic anemia  - started iron supplementation given low/low normal ferritin w/ microcytic anemia  - no overt signs of bleeding. Hgb uptrending    #subsegmental PE  - restarted Eliquis given stable H/H     #KENYATTA, resolved.    rest of plan as above.    dispo: JANET planning
#R FNF s/p subcutaneous nail pinning  - plan per ortho  - please order PT consult  - labs reviewed    #microcytic anemia  - iron panel WNL    #subsegmental PE  - start heparin drip tonight, transition to eliquis tomorrow if H/H is stable    #KENYATTA, resolved.    rest of plan as above.    dispo: pending PT reccs and pain control

## 2023-04-17 NOTE — BH CONSULTATION LIAISON ASSESSMENT NOTE - RISK ASSESSMENT
Chronic risk factors include history of psychosis, poor coping skills. Acute risk factors include recent medical issues, pain, homelessness, poor supports. Mitigating factors include help-seeking, denial of SI, future-oriented, fear of killing self, will to live. Overall patient is low chronic and acute risk of suicide.

## 2023-04-17 NOTE — DISCHARGE NOTE PROVIDER - CARE PROVIDER_API CALL
Rony Villarreal)  Orthopaedic Surgery  [address] [place] [address]  Phone: [phone]  Fax: [phone]  Scheduled Appointment: 04/27/2023 12:30 PM

## 2023-04-17 NOTE — BH CONSULTATION LIAISON ASSESSMENT NOTE - NSSUICPROTFACT_PSY_ALL_CORE
Identifies reasons for living/Fear of death or the actual act of killing self/Cultural, spiritual and/or moral attitudes against suicide

## 2023-04-17 NOTE — BH CONSULTATION LIAISON ASSESSMENT NOTE - NSBHCHARTREVIEWVS_PSY_A_CORE FT
Vital Signs Last 24 Hrs  T(C): 37 (17 Apr 2023 15:28), Max: 37.6 (16 Apr 2023 20:51)  T(F): 98.6 (17 Apr 2023 15:28), Max: 99.6 (16 Apr 2023 20:51)  HR: 61 (17 Apr 2023 15:28) (56 - 61)  BP: 106/52 (17 Apr 2023 15:28) (106/52 - 117/62)  BP(mean): --  RR: 18 (17 Apr 2023 15:28) (17 - 18)  SpO2: 96% (17 Apr 2023 15:28) (96% - 100%)    Parameters below as of 17 Apr 2023 15:28  Patient On (Oxygen Delivery Method): room air

## 2023-04-17 NOTE — PROGRESS NOTE ADULT - PROBLEM SELECTOR PLAN 5
Had episode of hypoglycemia to 49 in AM, received 250cc d10, improved to 144.   Likely i/s/o prolonged NPO yesterday and this AM perioperatively    Plan:  -FSG q6h

## 2023-04-17 NOTE — PROGRESS NOTE ADULT - PROBLEM SELECTOR PLAN 2
recent hx of PE. AC held for OR. Drop in H/H however no overt signs of bleeding and hip dressing c/d/i.   - monitor Hgb on heparin gtt today  long term A/c plan pending H/H and d/c planning w/ social work recent hx of PE. AC held for OR. Drop in H/H however no overt signs of bleeding and hip dressing c/d/i.   long term A/c plan pending H/H and d/c planning w/ social work.     Plan:  - d/c heparin gtt  - resume eliquis

## 2023-04-17 NOTE — BH CONSULTATION LIAISON ASSESSMENT NOTE - SUMMARY
71yo man, undomiciled, history of primary psychotic disorder, possible neurocognitive disorder, admitted to medicine after found to have R femoral neck fracture, s/p subcutaneous nail pinning with orthopedic surgery. Patient was recommended for JANET but declined to go. Psychiatry consulted to determine capacity to refuse JANET. Patient upon evaluation was amenable to JANET, as long as further discussion with team and PT was had about specific location. Patient did appear to be somewhat disorganized, illogical, and unable to sustain a choice about refusing JANET, and made an odd statement about the healing properties of the SCD machine. However, capacity to refuse was not fully assessed at this time given that the patient was amenable to JANET during this evaluation. No other acute psychiatric process noted; possible neurocognitive disorder +/- primary psychotic disorder not acutely decompensated.    RECOMMENDATIONS  --No acute need for psychiatric intervention  --Psychiatrically cleared for discharge  --Amenable to JANET at this time  --Please call with questions or further follow up  --Will sign off

## 2023-04-17 NOTE — DISCHARGE NOTE PROVIDER - NSDCFUSCHEDAPPT_GEN_ALL_CORE_FT
[facility] Physician [facility]  ORTHOSURG  [address]  Scheduled Appointment: 04/27/2023

## 2023-04-17 NOTE — BH CONSULTATION LIAISON ASSESSMENT NOTE - HPI (INCLUDE ILLNESS QUALITY, SEVERITY, DURATION, TIMING, CONTEXT, MODIFYING FACTORS, ASSOCIATED SIGNS AND SYMPTOMS)
73yo man, undomiciled, history of primary psychotic disorder, possible neurocognitive disorder, admitted to medicine after found to have R femoral neck fracture, s/p subcutaneous nail pinning with orthopedic surgery. Patient was recommended for JANET but declined to go. Psychiatry consulted to determine capacity to refuse JANET.    Upon evaluation, patient was calm, cooperative, mostly euthymic, linear, occasionally disorganized making odd statements. AOx4, able to describe that his leg was fractured and that he required surgery. When next steps and JANET were discussed, patient was amenable "as long as its not Eleanor". He was unable to provide further information about his choice. He then stated that "this machine" (pointing to SCD machine at the foot of his bed) has been helping him "get in touch with my muscular glands". When asked to clarify, he stated that he has been "getting stronger" via this machine and may not ultimately need JANET. Further discussion and education provided and he was overall amenable to JANET as long as he knew where he was going, "I'll speak to them tomorrow about the exact place".    Otherwise denied SI, HI, AH, paranoid thoughts, other psychiatric complaints at this time.

## 2023-04-17 NOTE — DISCHARGE NOTE PROVIDER - NSDCFUADDAPPT_GEN_ALL_CORE_FT
Please attend Orthopedics Post-OP follow-up visit with Dr. Rony Buckley MD at [address], 12th Floor [facility] on 4/27/2023 at 12:30PM.  Please attend your post-operative appointment with your orthopedic surgery team at [address] [place] [address], 12th Floor [facility] on 4/27/2023 at 12:30PM.  Please attend your post-operative appointment with your orthopedic surgery team at [address] [place] [address], 12th Floor, [facility] on 4/27/2023 at 12:30PM.

## 2023-04-18 ENCOUNTER — TRANSCRIPTION ENCOUNTER (OUTPATIENT)
Age: 73
End: 2023-04-18

## 2023-04-18 VITALS
DIASTOLIC BLOOD PRESSURE: 62 MMHG | OXYGEN SATURATION: 99 % | SYSTOLIC BLOOD PRESSURE: 122 MMHG | HEART RATE: 66 BPM | TEMPERATURE: 99 F | RESPIRATION RATE: 18 BRPM

## 2023-04-18 DIAGNOSIS — F20.9 SCHIZOPHRENIA, UNSPECIFIED: ICD-10-CM

## 2023-04-18 DIAGNOSIS — K21.9 GASTRO-ESOPHAGEAL REFLUX DISEASE WITHOUT ESOPHAGITIS: ICD-10-CM

## 2023-04-18 LAB
ANION GAP SERPL CALC-SCNC: 8 MMOL/L — SIGNIFICANT CHANGE UP (ref 5–17)
ANISOCYTOSIS BLD QL: SIGNIFICANT CHANGE UP
BASOPHILS # BLD AUTO: 0 K/UL — SIGNIFICANT CHANGE UP (ref 0–0.2)
BASOPHILS NFR BLD AUTO: 0 % — SIGNIFICANT CHANGE UP (ref 0–2)
BUN SERPL-MCNC: 19 MG/DL — SIGNIFICANT CHANGE UP (ref 7–23)
CALCIUM SERPL-MCNC: 9.1 MG/DL — SIGNIFICANT CHANGE UP (ref 8.4–10.5)
CHLORIDE SERPL-SCNC: 101 MMOL/L — SIGNIFICANT CHANGE UP (ref 96–108)
CO2 SERPL-SCNC: 27 MMOL/L — SIGNIFICANT CHANGE UP (ref 22–31)
CREAT SERPL-MCNC: 0.92 MG/DL — SIGNIFICANT CHANGE UP (ref 0.5–1.3)
DACRYOCYTES BLD QL SMEAR: SLIGHT — SIGNIFICANT CHANGE UP
EGFR: 88 ML/MIN/1.73M2 — SIGNIFICANT CHANGE UP
EOSINOPHIL # BLD AUTO: 0.18 K/UL — SIGNIFICANT CHANGE UP (ref 0–0.5)
EOSINOPHIL NFR BLD AUTO: 3.5 % — SIGNIFICANT CHANGE UP (ref 0–6)
GIANT PLATELETS BLD QL SMEAR: PRESENT — SIGNIFICANT CHANGE UP
GLUCOSE SERPL-MCNC: 90 MG/DL — SIGNIFICANT CHANGE UP (ref 70–99)
HCT VFR BLD CALC: 25.8 % — LOW (ref 39–50)
HGB BLD-MCNC: 8 G/DL — LOW (ref 13–17)
HYPOCHROMIA BLD QL: SIGNIFICANT CHANGE UP
LYMPHOCYTES # BLD AUTO: 1.38 K/UL — SIGNIFICANT CHANGE UP (ref 1–3.3)
LYMPHOCYTES # BLD AUTO: 26.5 % — SIGNIFICANT CHANGE UP (ref 13–44)
MACROCYTES BLD QL: SLIGHT — SIGNIFICANT CHANGE UP
MAGNESIUM SERPL-MCNC: 1.8 MG/DL — SIGNIFICANT CHANGE UP (ref 1.6–2.6)
MANUAL SMEAR VERIFICATION: SIGNIFICANT CHANGE UP
MCHC RBC-ENTMCNC: 21 PG — LOW (ref 27–34)
MCHC RBC-ENTMCNC: 31 GM/DL — LOW (ref 32–36)
MCV RBC AUTO: 67.7 FL — LOW (ref 80–100)
MICROCYTES BLD QL: SIGNIFICANT CHANGE UP
MONOCYTES # BLD AUTO: 0.46 K/UL — SIGNIFICANT CHANGE UP (ref 0–0.9)
MONOCYTES NFR BLD AUTO: 8.9 % — SIGNIFICANT CHANGE UP (ref 2–14)
NEUTROPHILS # BLD AUTO: 3.18 K/UL — SIGNIFICANT CHANGE UP (ref 1.8–7.4)
NEUTROPHILS NFR BLD AUTO: 61.1 % — SIGNIFICANT CHANGE UP (ref 43–77)
OVALOCYTES BLD QL SMEAR: SLIGHT — SIGNIFICANT CHANGE UP
PHOSPHATE SERPL-MCNC: 3.8 MG/DL — SIGNIFICANT CHANGE UP (ref 2.5–4.5)
PLAT MORPH BLD: ABNORMAL
PLATELET # BLD AUTO: 204 K/UL — SIGNIFICANT CHANGE UP (ref 150–400)
POIKILOCYTOSIS BLD QL AUTO: SIGNIFICANT CHANGE UP
POLYCHROMASIA BLD QL SMEAR: SLIGHT — SIGNIFICANT CHANGE UP
POTASSIUM SERPL-MCNC: 4.2 MMOL/L — SIGNIFICANT CHANGE UP (ref 3.5–5.3)
POTASSIUM SERPL-SCNC: 4.2 MMOL/L — SIGNIFICANT CHANGE UP (ref 3.5–5.3)
RBC # BLD: 3.81 M/UL — LOW (ref 4.2–5.8)
RBC # FLD: 17.1 % — HIGH (ref 10.3–14.5)
RBC BLD AUTO: ABNORMAL
SCHISTOCYTES BLD QL AUTO: SLIGHT — SIGNIFICANT CHANGE UP
SODIUM SERPL-SCNC: 136 MMOL/L — SIGNIFICANT CHANGE UP (ref 135–145)
TARGETS BLD QL SMEAR: SIGNIFICANT CHANGE UP
WBC # BLD: 5.21 K/UL — SIGNIFICANT CHANGE UP (ref 3.8–10.5)
WBC # FLD AUTO: 5.21 K/UL — SIGNIFICANT CHANGE UP (ref 3.8–10.5)

## 2023-04-18 PROCEDURE — 36430 TRANSFUSION BLD/BLD COMPNT: CPT

## 2023-04-18 PROCEDURE — 85730 THROMBOPLASTIN TIME PARTIAL: CPT

## 2023-04-18 PROCEDURE — 85027 COMPLETE CBC AUTOMATED: CPT

## 2023-04-18 PROCEDURE — 72170 X-RAY EXAM OF PELVIS: CPT

## 2023-04-18 PROCEDURE — 84466 ASSAY OF TRANSFERRIN: CPT

## 2023-04-18 PROCEDURE — 83735 ASSAY OF MAGNESIUM: CPT

## 2023-04-18 PROCEDURE — 73552 X-RAY EXAM OF FEMUR 2/>: CPT

## 2023-04-18 PROCEDURE — 76000 FLUOROSCOPY <1 HR PHYS/QHP: CPT

## 2023-04-18 PROCEDURE — 73502 X-RAY EXAM HIP UNI 2-3 VIEWS: CPT

## 2023-04-18 PROCEDURE — 84100 ASSAY OF PHOSPHORUS: CPT

## 2023-04-18 PROCEDURE — 36415 COLL VENOUS BLD VENIPUNCTURE: CPT

## 2023-04-18 PROCEDURE — 93970 EXTREMITY STUDY: CPT

## 2023-04-18 PROCEDURE — 83550 IRON BINDING TEST: CPT

## 2023-04-18 PROCEDURE — 86901 BLOOD TYPING SEROLOGIC RH(D): CPT

## 2023-04-18 PROCEDURE — 81003 URINALYSIS AUTO W/O SCOPE: CPT

## 2023-04-18 PROCEDURE — P9016: CPT

## 2023-04-18 PROCEDURE — 71045 X-RAY EXAM CHEST 1 VIEW: CPT

## 2023-04-18 PROCEDURE — 83540 ASSAY OF IRON: CPT

## 2023-04-18 PROCEDURE — 97161 PT EVAL LOW COMPLEX 20 MIN: CPT

## 2023-04-18 PROCEDURE — 99238 HOSP IP/OBS DSCHRG MGMT 30/<: CPT | Mod: GC

## 2023-04-18 PROCEDURE — 86902 BLOOD TYPE ANTIGEN DONOR EA: CPT

## 2023-04-18 PROCEDURE — 81001 URINALYSIS AUTO W/SCOPE: CPT

## 2023-04-18 PROCEDURE — 82962 GLUCOSE BLOOD TEST: CPT

## 2023-04-18 PROCEDURE — 82728 ASSAY OF FERRITIN: CPT

## 2023-04-18 PROCEDURE — 97116 GAIT TRAINING THERAPY: CPT

## 2023-04-18 PROCEDURE — 80307 DRUG TEST PRSMV CHEM ANLYZR: CPT

## 2023-04-18 PROCEDURE — 86900 BLOOD TYPING SEROLOGIC ABO: CPT

## 2023-04-18 PROCEDURE — 86922 COMPATIBILITY TEST ANTIGLOB: CPT

## 2023-04-18 PROCEDURE — 87040 BLOOD CULTURE FOR BACTERIA: CPT

## 2023-04-18 PROCEDURE — 85610 PROTHROMBIN TIME: CPT

## 2023-04-18 PROCEDURE — 85025 COMPLETE CBC W/AUTO DIFF WBC: CPT

## 2023-04-18 PROCEDURE — 99285 EMERGENCY DEPT VISIT HI MDM: CPT

## 2023-04-18 PROCEDURE — 87635 SARS-COV-2 COVID-19 AMP PRB: CPT

## 2023-04-18 PROCEDURE — C1713: CPT

## 2023-04-18 PROCEDURE — 80048 BASIC METABOLIC PNL TOTAL CA: CPT

## 2023-04-18 PROCEDURE — 73700 CT LOWER EXTREMITY W/O DYE: CPT | Mod: MA

## 2023-04-18 PROCEDURE — 99232 SBSQ HOSP IP/OBS MODERATE 35: CPT

## 2023-04-18 PROCEDURE — 86850 RBC ANTIBODY SCREEN: CPT

## 2023-04-18 RX ORDER — ACETAMINOPHEN 500 MG
2 TABLET ORAL
Qty: 0 | Refills: 0 | DISCHARGE
Start: 2023-04-18

## 2023-04-18 RX ORDER — POLYETHYLENE GLYCOL 3350 17 G/17G
17 POWDER, FOR SOLUTION ORAL
Qty: 0 | Refills: 0 | DISCHARGE
Start: 2023-04-18

## 2023-04-18 RX ORDER — HALOPERIDOL DECANOATE 100 MG/ML
5 INJECTION INTRAMUSCULAR ONCE
Refills: 0 | Status: COMPLETED | OUTPATIENT
Start: 2023-04-18 | End: 2023-04-18

## 2023-04-18 RX ORDER — FERROUS SULFATE 325(65) MG
325 TABLET ORAL
Refills: 0 | Status: DISCONTINUED | OUTPATIENT
Start: 2023-04-18 | End: 2023-04-18

## 2023-04-18 RX ORDER — SENNA PLUS 8.6 MG/1
2 TABLET ORAL
Qty: 0 | Refills: 0 | DISCHARGE
Start: 2023-04-18

## 2023-04-18 RX ORDER — FERROUS SULFATE 325(65) MG
1 TABLET ORAL
Qty: 0 | Refills: 0 | DISCHARGE
Start: 2023-04-18

## 2023-04-18 RX ADMIN — Medication 325 MILLIGRAM(S): at 12:16

## 2023-04-18 RX ADMIN — Medication 650 MILLIGRAM(S): at 00:25

## 2023-04-18 RX ADMIN — HALOPERIDOL DECANOATE 5 MILLIGRAM(S): 100 INJECTION INTRAMUSCULAR at 16:18

## 2023-04-18 RX ADMIN — Medication 2 MILLIGRAM(S): at 16:18

## 2023-04-18 RX ADMIN — ARIPIPRAZOLE 10 MILLIGRAM(S): 15 TABLET ORAL at 12:16

## 2023-04-18 NOTE — PROGRESS NOTE ADULT - REASON FOR ADMISSION
femoral neck fracture

## 2023-04-18 NOTE — BH CONSULTATION LIAISON PROGRESS NOTE - CURRENT MEDICATION
MEDICATIONS  (STANDING):  apixaban 5 milliGRAM(s) Oral every 12 hours  ARIPiprazole 10 milliGRAM(s) Oral daily  ferrous    sulfate 325 milliGRAM(s) Oral <User Schedule>  haloperidol    Injectable 5 milliGRAM(s) IV Push once  LORazepam   Injectable 2 milliGRAM(s) IV Push once  polyethylene glycol 3350 17 Gram(s) Oral every 24 hours  senna 2 Tablet(s) Oral at bedtime    MEDICATIONS  (PRN):  acetaminophen     Tablet .. 650 milliGRAM(s) Oral every 6 hours PRN Temp greater or equal to 38C (100.4F), Mild Pain (1 - 3)

## 2023-04-18 NOTE — PROGRESS NOTE ADULT - ASSESSMENT
71yo man, undomiciled, hx schizophrenia, hx subsegmental PE (recent dx March 2023, poor adherence on Eliquis), who presents for 2-3 week history severe R hip/groin pain. Patient is poor historian however able to report severe pain symptoms in R hip/groin region for past 2-3 weeks, found on imaging to have R femoral neck fracture, planned to undergo subcutaneous nail pinning with Ortho team. 
{\rtf1\tenpcd68823\ansi\phiucfi0390\ftnbj\uc1\deff0  {\fonttbl{\f0 \fnil Segoe UI;}{\f1 \fnil \fcharset0 Segoe UI;}{\f2 \fnil Times New Emiliano;}}  {\colortbl ;\dvx251\xyrxh987\qwpf090 ;\red0\green0\blue0 ;\red0\green0\pmjm105 ;\red0\green0\blue0 ;}  {\stylesheet{\f0\fs20 Normal;}{\cs1 Default Paragraph Font;}{\cs2\f0\fs16 Line Number;}{\cs3\f2\fs24\ul\cf3 Hyperlink;}}  {\*\revtbl{Unknown;}}  \xamtzr11442\phgtrn32079\hksjk1113\cplru0167\nempv1318\lgyfy9120\wbionpc072\wbqzhqk335\nogrowautofit\jdhknx541\formshade\nofeaturethrottle1\dntblnsbdb\fet4\aendnotes\aftnnrlc\pgbrdrhead\pgbrdrfoot  \sectd\sjidxj14455\rzzlzk97374\guttersxn0\guzxaaav2886\qfudvxos2657\marlhizr7100\rqqbtqpp0446\aabdvri509\ctquzdb339\sbkpage\pgncont\pgndec  \plain\plain\f0\fs24\ql\plain\f0\fs24\plain\f0\fs20\ngrg6374\hich\f0\dbch\f0\loch\f0\fs20 IM\par  \par  72 y o man, undomiciled, hx schizophrenia, hx subsegmental PE (recent dx March 2023, poor adherence on Eliquis), who presents for 2-3 week history severe R hip/groin pain. Patient is poor historian however able to report severe pain symptoms in R hip/groin   region for past 2-3 weeks, found on imaging to have R femoral neck fracture, s/p subcutaneous nail pinning with Ortho team. Pending JANET.\par  \par  \plain\f1\fs20\uvox9617\hich\f1\dbch\f1\loch\f1\cf2\fs20\ul{\field{\*\fldinst HYPERLINK [id number] }{\fldrslt Problem/Plan - 1:}}\plain\f0\fs20\lpjh9994\hich\f0\dbch\f0\loch\f0\fs20\ql\par  \'b7  {\*\bkmkstart [id number]}{\*\bkmkend eo29353609971}Problem: {\*\bkmkstart qz98122922859}{\*\bkmkend xm40886922202}Fracture of femoral neck, right. \par  \'b7  {\*\bkmkstart qv39607108869}{\*\bkmkend fk64594911273}Plan: {\*\bkmkstart md35264292278}{\*\bkmkend qc08014266339}s/p IMN. uncomplicated per orthopedics team and OR records\par  Post op pain controlled. no evidence of bleeding\par  Plan:\par  - ICS, OOB to chair\par  - PT recs JANET.\plain\f1\fs20\qdzy5688\hich\f1\dbch\f1\loch\f1\cf2\fs20\strike\plain\f0\fs20\tlbh5060\hich\f0\dbch\f0\loch\f0\fs20\par  \par  \plain\f1\fs20\smlk4941\hich\f1\dbch\f1\loch\f1\cf2\fs20\ul{\field{\*\fldinst HYPERLINK [id number] }{\fldrslt Problem/Plan - 2:}}\plain\f0\fs20\xjir3224\hich\f0\dbch\f0\loch\f0\fs20\ql\par  \'b7  {\*\bkmkstart lh73505403031}{\*\bkmkend mi13642439351}Problem: {\*\bkmkstart kk93380415510}{\*\bkmkend ry22925529803}History of pulmonary embolism. \par  \'b7  {\*\bkmkstart tj67955285610}{\*\bkmkend fg38851057186}Plan: {\*\bkmkstart sc04721287393}{\*\bkmkend ir56428971682}recent hx of PE. AC held for OR. Drop in H/H however no overt signs of bleeding and hip dressing c/d/i. \par  long term A/c plan pending H/H and d/c planning w/ social work. \par  \par  Plan:\par  - d/c heparin gtt\par  - resume eliquis.\plain\f1\fs20\ycsn2995\hich\f1\dbch\f1\loch\f1\cf2\fs20\strike\plain\f0\fs20\mvus7145\hich\f0\dbch\f0\loch\f0\fs20\par  \par  \plain\f1\fs20\kwgo9550\hich\f1\dbch\f1\loch\f1\cf2\fs20\ul{\field{\*\fldinst HYPERLINK [id number] }{\fldrslt Problem/Plan - 3:}}\plain\f0\fs20\hffu1781\hich\f0\dbch\f0\loch\f0\fs20\ql\par  \'b7  {\*\bkmkstart og81865445047}{\*\bkmkend lm09506778789}Problem: {\*\bkmkstart td16427699434}{\*\bkmkend tg08445394568}Microcytic anemia. \par  \'b7  {\*\bkmkstart kh05037289587}{\*\bkmkend ec61286368475}Plan: {\*\bkmkstart hg71157080028}{\*\bkmkend wa34101074001}microcytic anemia. suspect iron deficiency. possibly nutritional deficiencies however he does not recall last colonoscopy and has had   poor follow up due to schizophrenia and lack of access to care. \par   started iron supplementation given low/low normal ferritin w/ microcytic anemia\par  - no overt signs of bleeding. s/p 1 U PRBC.\par  \par  \plain\f1\fs20\jsuq7707\hich\f1\dbch\f1\loch\f1\cf2\fs20\ul{\field{\*\fldinst HYPERLINK [id number] }{\fldrslt Problem/Plan - 4:}}\plain\f0\fs20\ijet3532\hich\f0\dbch\f0\loch\f0\fs20\ql\par  \'b7  {\*\bkmkstart zi25134193963}{\*\bkmkend mn27431989257}Problem: {\*\bkmkstart iv80602072180}{\*\bkmkend xp59989719269}Nutrition, metabolism, and development symptoms. \par  \'b7  {\*\bkmkstart vk47767855642}{\*\bkmkend tn57388783368}Plan: {\*\bkmkstart au14997563682}{\*\bkmkend eh79956991077}F: none\par  E: replete PRN\par  N:  regular diet \par  GI ppx: c/w home protonix\par  DVT ppx: eliquis.\plain\f1\fs20\kvfo1685\hich\f1\dbch\f1\loch\f1\cf2\fs20\strike\plain\f0\fs20\qsbb9949\hich\f0\dbch\f0\loch\f0\fs20\par  \par  \plain\f1\fs20\ivqe6567\hich\f1\dbch\f1\loch\f1\cf2\fs20\ul{\field{\*\fldinst HYPERLINK [id number] }{\fldrslt Problem/Plan - 5:}}\plain\f0\fs20\xujl7185\hich\f0\dbch\f0\loch\f0\fs20\ql\par  \'b7  {\*\bkmkstart vt35367704072}{\*\bkmkend dl17044087528}Problem: {\*\bkmkstart kc94204869864}{\*\bkmkend hu25262034222}Hypoglycemia. \par  \'b7  {\*\bkmkstart gq20319009249}{\*\bkmkend eo64693004716}Plan: {\*\bkmkstart fn40059290753}{\*\bkmkend dh80061337689}Had episode of hypoglycemia to 49 in AM, received 250cc d10, improved to 144. \par  Likely i/s/o prolonged NPO yesterday and this AM perioperatively\par  \par  Plan:\par  -FSG q6h{\*\bkmkstart bkcommentCR}{\*\bkmkend bkcommentCR}.\par  \par  \par  \par  }  
71yo man, undomiciled, hx schizophrenia, hx subsegmental PE (recent dx March 2023, poor adherence on Eliquis), who presents for 2-3 week history severe R hip/groin pain. Patient is poor historian however able to report severe pain symptoms in R hip/groin region for past 2-3 weeks, found on imaging to have R femoral neck fracture, planned to undergo subcutaneous nail pinning with Ortho team. 
71yo man, undomiciled, hx schizophrenia, hx subsegmental PE (recent dx March 2023, poor adherence on Eliquis), who presents for 2-3 week history severe R hip/groin pain. Patient is poor historian however able to report severe pain symptoms in R hip/groin region for past 2-3 weeks, found on imaging to have R femoral neck fracture, s/p subcutaneous nail pinning with Ortho team. Pending JANET.

## 2023-04-18 NOTE — BH CONSULTATION LIAISON PROGRESS NOTE - NSICDXBHSECONDARYDX_PSY_ALL_CORE
Suspected deep vein thrombosis (DVT)   [id number]  Preop examination   Z01.818  Fracture of femoral neck, right   S72.001A  Nutrition, metabolism, and development symptoms   R63.8  History of pulmonary embolism   Z86.711  KENYATTA (acute kidney injury)   N17.9  Hypoglycemia   E16.2  Microcytic anemia   D50.9  Schizophrenia   F20.9  GERD (gastroesophageal reflux disease)   K21.9

## 2023-04-18 NOTE — PROGRESS NOTE ADULT - PROBLEM SELECTOR PLAN 6
F: 1L LR  E: replete PRN  N: NPO before procedure, regular diet post-procedure  GI ppx: c/w home protonix  DVT ppx: heparin gtt (held perioperatively)   Dispo: [facility]
F: none  E: replete PRN  N:  regular diet   GI ppx: c/w home protonix  DVT ppx: eliquis 5mg BID
F: 1L LR  E: replete PRN  N: NPO before procedure, regular diet post-procedure  GI ppx: c/w home protonix  DVT ppx: heparin gtt (held perioperatively)   Dispo: [facility]

## 2023-04-18 NOTE — PROGRESS NOTE ADULT - PROBLEM SELECTOR PROBLEM 1
Fracture of femoral neck, right
Preop examination
Fracture of femoral neck, right

## 2023-04-18 NOTE — BH CONSULTATION LIAISON PROGRESS NOTE - NSBHASSESSMENTFT_PSY_ALL_CORE
73yo man, undomiciled, history of primary psychotic disorder, possible neurocognitive disorder, admitted to medicine after found to have R femoral neck fracture, s/p subcutaneous nail pinning with orthopedic surgery. Patient was recommended for JANET but declined to go. Psychiatry initially consulted to determine capacity to refuse JANET but was unable to make that determination after pt was agreeable to JANET placement.  Pt. with frequent reversals of choice, no appreciation for the situation and its consequences, unable to reason about treatment options, and unable to demonstrate an understanding of the relevant information.  Pt. does not have capacity to refuse JANET placement.

## 2023-04-18 NOTE — PROGRESS NOTE ADULT - PROBLEM SELECTOR PLAN 1
XRay, Hip: nondisplaced R femoral neck fracture  CT Hip: Acute nondisplaced subcapital right femoral neck fracture. Mild subcutaneous stranding lateral to the right hip, possibly contusion.  Underwent intramedullary nailing of R hip with orthopedic surgery. Uncomplicated per orthopedics team and OR records. Post op pain controlled.   Plan:  - PT recs JANET  - Tylenol for pain  - Weight bearing as tolerated   - follow-up with orthopedic surgery Dr. Rony Martinez-Bennett or ortho resident clinic within 1 week for post-discharge follow-up

## 2023-04-18 NOTE — PROGRESS NOTE ADULT - PROBLEM SELECTOR PROBLEM 4
Microcytic anemia
KENYATTA (acute kidney injury)
KENYATTA (acute kidney injury)
Nutrition, metabolism, and development symptoms
Nutrition, metabolism, and development symptoms

## 2023-04-18 NOTE — PROGRESS NOTE ADULT - PROVIDER SPECIALTY LIST ADULT
Orthopedics
Orthopedics
Internal Medicine
Orthopedics
Rehab Medicine
Internal Medicine
Hospitalist
Internal Medicine
Internal Medicine

## 2023-04-18 NOTE — PROGRESS NOTE ADULT - PROBLEM SELECTOR PROBLEM 3
History of pulmonary embolism
Microcytic anemia
Microcytic anemia
Schizophrenia
Microcytic anemia
normal...

## 2023-04-18 NOTE — PROGRESS NOTE ADULT - SUBJECTIVE AND OBJECTIVE BOX
OVERNIGHT EVENTS/INTERVAL HPI: No acute events overnight. Pt examined at bedside this AM, states he is feeling well and does not feel any pain while at rest. Pt states he has mild pain in the R hip while moving and standing up. Denies F/C, chest pain, N/V, LE pain.     OBJECTIVE:  Vital Signs Last 24 Hrs  T(C): 36.6 (18 Apr 2023 05:48), Max: 37.2 (17 Apr 2023 21:26)  T(F): 97.9 (18 Apr 2023 05:48), Max: 99 (17 Apr 2023 21:26)  HR: 62 (18 Apr 2023 05:48) (61 - 62)  BP: 123/64 (18 Apr 2023 05:48) (106/52 - 123/64)  BP(mean): --  RR: 18 (18 Apr 2023 05:48) (18 - 18)  SpO2: 100% (18 Apr 2023 05:48) (96% - 100%)    Parameters below as of 18 Apr 2023 05:48  Patient On (Oxygen Delivery Method): room air      I&O's Detail    17 Apr 2023 07:01  -  18 Apr 2023 07:00  --------------------------------------------------------  IN:    Heparin: 18 mL  Total IN: 18 mL    OUT:  Total OUT: 0 mL    Total NET: 18 mL      PHYSICAL EXAM:  General: NAD, laying in bed, appears comfortable  HEENT: no conjunctival injection, EOMI, MMM  Neck: supple, no JVD  Cardio: RRR, +S1/S2, no M/R/G  Resp: lungs CTAB, no cough/wheezes/rales/rhonchi  Abdo: soft, NT, ND, +BS, no organomegaly or palpable mass    Extremities: WWP, +b/l 1+ LE edema below the knees otherwise no cyanosis/clubbing   Vasc: 2+ radial and DP pulses b/l  Neuro: A&Ox3  Psych: speech non-pressured, tangential thoughts  Skin: very dry, intact, no visible jaundice     Medications:  MEDICATIONS  (STANDING):  apixaban 5 milliGRAM(s) Oral every 12 hours  ARIPiprazole 10 milliGRAM(s) Oral daily  ferrous    sulfate 325 milliGRAM(s) Oral <User Schedule>  polyethylene glycol 3350 17 Gram(s) Oral every 24 hours  senna 2 Tablet(s) Oral at bedtime    MEDICATIONS  (PRN):  acetaminophen     Tablet .. 650 milliGRAM(s) Oral every 6 hours PRN Temp greater or equal to 38C (100.4F), Mild Pain (1 - 3)      Labs:                        8.0    5.21  )-----------( 204      ( 18 Apr 2023 05:30 )             25.8     04-18    136  |  101  |  19  ----------------------------<  90  4.2   |  27  |  0.92    Ca    9.1      18 Apr 2023 05:30  Phos  3.8     04-18  Mg     1.8     04-18      PT/INR - ( 17 Apr 2023 07:39 )   PT: 12.6 sec;   INR: 1.06          PTT - ( 17 Apr 2023 07:39 )  PTT:60.7 sec      COVID-19 PCR: Negative (13 Apr 2023 05:25)  SARS-CoV-2: [place] (15 Mar 2023 11:17)

## 2023-04-18 NOTE — PROGRESS NOTE ADULT - SUBJECTIVE AND OBJECTIVE BOX
Ortho Progress Note     Procedure: Right Hip CRPP  Surgeon: Dr. Beal    Pt seen and examined at bedside. Pt comfortable without complaints, pain controlled. Accepted at [facility] but does not want to go to [facility]   Denies CP, SOB, N/V, numbness/tingling     Vital Signs Last 24 Hrs  T(C): 37.2 (17 Apr 2023 21:26), Max: 37.3 (17 Apr 2023 05:40)  T(F): 99 (17 Apr 2023 21:26), Max: 99.2 (17 Apr 2023 05:40)  HR: 61 (17 Apr 2023 21:26) (56 - 61)  BP: 118/57 (17 Apr 2023 21:26) (106/52 - 118/57)  BP(mean): --  RR: 18 (17 Apr 2023 21:26) (17 - 18)  SpO2: 96% (17 Apr 2023 21:26) (96% - 100%)    Parameters below as of 17 Apr 2023 21:26  Patient On (Oxygen Delivery Method): room air    General: Pt Alert and oriented, NAD  DSG C/D/I - Aquacel to right hip.   Pulses: 2+ DP pulses palpable bilaterally. Skin wwp. Cap refill brisk.   Sensation: SILT to bilateral lower extremities  Motor: EHL/FHL/TA/GS 5/5 bilaterally    A/P: 71yo Male s/p Right Hip CRPP on 4/14 w/ Dr Beal   - Stable  - Pain Control per primary team  - AC: hep gtt as per primary   - Post op abx: Ancef  - PT, WBS: WBAT   - please have patient follow up with Dr Rony [facility] when d/c from hospital for 1st postop visit     Ortho Pager [phone]

## 2023-04-18 NOTE — PROGRESS NOTE ADULT - SUBJECTIVE AND OBJECTIVE BOX
Physical Medicine and Rehabilitation Progress Note :       Patient is a 72y old  Male who presents with a chief complaint of femoral neck fracture (18 Apr 2023 05:18)      HPI:  Mr. Mathur is a 73yo man, undomiciled, hx schizophrenia, hx subsegmental PE (recent dx March 2023, poor adherence on Eliquis), who presents for 2-3 week history severe R hip/groin pain. Patient is poor historian however able to report severe pain symptoms in R hip/groin region for past 2-3 weeks, however does not directly respond when asked about any preceding mechanical trauma history. He walks with a walker at baseline, but unable to estimate a duration/chronicity of use. He was recently admitted to [facility] MICU in March for hypothermia and managed for septic shock without known source, and incidentally found to have b/l subsegmental PEs w/o heart strain, subsequently discharged on Eliquis. He reports taking only 1 tablet once a day, however admits to frequently missing doses and recalls last dose was approximately 2-3 days ago. He otherwise denies recent or active fever, chills, headaches, nausea, vomiting, chest pain, dyspnea on exertion, cough, sputum production, hemoptysis, lightheadedness, abdominal pain, genitourinary sx, acute extremity weakness. (13 Apr 2023 09:50)                            8.0    5.21  )-----------( 204      ( 18 Apr 2023 05:30 )             25.8       04-18    136  |  101  |  19  ----------------------------<  90  4.2   |  27  |  0.92    Ca    9.1      18 Apr 2023 05:30  Phos  3.8     04-18  Mg     1.8     04-18      Vital Signs Last 24 Hrs  T(C): 36.6 (18 Apr 2023 05:48), Max: 37.2 (17 Apr 2023 21:26)  T(F): 97.9 (18 Apr 2023 05:48), Max: 99 (17 Apr 2023 21:26)  HR: 62 (18 Apr 2023 05:48) (61 - 62)  BP: 123/64 (18 Apr 2023 05:48) (106/52 - 123/64)  BP(mean): --  RR: 18 (18 Apr 2023 05:48) (18 - 18)  SpO2: 100% (18 Apr 2023 05:48) (96% - 100%)    Parameters below as of 18 Apr 2023 05:48  Patient On (Oxygen Delivery Method): room air        MEDICATIONS  (STANDING):  apixaban 5 milliGRAM(s) Oral every 12 hours  ARIPiprazole 10 milliGRAM(s) Oral daily  ferrous    sulfate 325 milliGRAM(s) Oral <User Schedule>  polyethylene glycol 3350 17 Gram(s) Oral every 24 hours  senna 2 Tablet(s) Oral at bedtime    MEDICATIONS  (PRN):  acetaminophen     Tablet .. 650 milliGRAM(s) Oral every 6 hours PRN Temp greater or equal to 38C (100.4F), Mild Pain (1 - 3)          Functional Status Assessment :         Pain Assessment/Number Scale (0-10) Adult  Presence of Pain: denies pain/discomfort (Rating = 0)  Pain Rating (0-10): Rest: 0 (no pain/absence of nonverbal indicators of pain)  Pain Rating (0-10): Activity: 0 (no pain/absence of nonverbal indicators of pain)    Safety      AM-PAC Functional Assessment: Basic Mobility  Type of Assessment: Daily assessment  Turning from your back to your side while in a flat bed without using bedrails?: 3 = A little assistance  Moving from lying on your back to sitting on the flat side of a flat bed without using bedrails?: 3 = A little assistance  Moving to and from a bed to a chair (including a wheelchair)?: 3 = A little assistance  Standing up from a chair using your arms (e.g. wheelchair or bedside chair)?: 3 = A little assistance  Walking in hospital room?: 3 = A little assistance  Climbing 3-5 steps with a railing?: 3 = A little assistance  Score: 18   Row Comment: Ask the patient "How much help from another person do you currently need? (If the patient hasn't done an activity recently, how much help from another person do you think he/she needs if he/she tried?)    Cognitive/Neuro      Language Assistance  Preferred Language to Address Healthcare Preferred Language to Address Healthcare: English    Therapeutic Interventions      Bed Mobility  Bed Mobility Training Rolling/Turning: contact guard;  verbal cues;  bed rails  Bed Mobility Training Scooting: contact guard;  verbal cues;  bed rails  Bed Mobility Training Sit-to-Supine: not assessed; patient returned seated in bedside chair  Bed Mobility Training Supine-to-Sit: contact guard;  verbal cues;  bed rails  Bed Mobility Training Limitations: decreased ability to use legs for bridging/pushing;  decreased flexibility;  decreased ROM;  decreased strength;  impaired balance    Sit-Stand Transfer Training  Transfer Training Sit-to-Stand Transfer: contact guard;  verbal cues;  weight-bearing as tolerated   rolling walker  Transfer Training Stand-to-Sit Transfer: contact guard;  verbal cues;  weight-bearing as tolerated   rolling walker  Sit-to-Stand Transfer Training Transfer Safety Analysis: decreased weight-shifting ability;  Demo fair eccentric control during descend;  decreased flexibility;  decreased ROM;  decreased strength;  impaired balance;  rolling walker    Gait Training  Gait Training: contact guard;  verbal cues;  weight-bearing as tolerated   rolling walker;  25 feet  Gait Analysis: decreased nahid;  increased time in double stance;  decreased hip/knee flexion;  decreased velocity of limb motion;  decreased step length;  decreased stride length;  decreased weight-shifting ability;  Improved gait mechanics and distance this session, however, demos impaired weight shifting ability, and antalgic mechanics. No LOB observed. ;  decreased flexibility;  decreased ROM;  decreased strength;  impaired balance;  25 feet;  rolling walker        PM&R Impression : as above    Current Disposition Plan Recommendations :    subacute rehab placement

## 2023-04-18 NOTE — DISCHARGE NOTE NURSING/CASE MANAGEMENT/SOCIAL WORK - PATIENT PORTAL LINK FT
You can access the FollowMyHealth Patient Portal offered by [facility] by registering at the following website: [email or web address]. By joining [employer]’s FollowMyHealth portal, you will also be able to view your health information using other applications (apps) compatible with our system.

## 2023-04-18 NOTE — PROGRESS NOTE ADULT - PROBLEM SELECTOR PLAN 4
Total Fe low: 36, TIBC WNL, %Sat low: 14, ferritin low: 28.   Started iron supplementation given low/low normal ferritin w/ microcytic anemia. No overt signs of bleeding.   Received 1 U PRBC.  **If patient develops dark stools, it may be due to iron supplementation**  Plan:  - start ferrous sulfate 325mg every other day  - c/w miralax 17g qd  - c/w senna 2 tabs qhs

## 2023-04-18 NOTE — DISCHARGE NOTE NURSING/CASE MANAGEMENT/SOCIAL WORK - HAS THE PATIENT USED TOBACCO IN THE PAST 30 DAYS?
Price (Do Not Change): 0.00 Instructions: This plan will send the code FBSD to the PM system.  DO NOT or CHANGE the price. Yes Detail Level: Simple

## 2023-04-18 NOTE — BH CONSULTATION LIAISON PROGRESS NOTE - NSBHROSSTATEMENT_PSY_A_CORE
. Humira Pregnancy And Lactation Text: This medication is Pregnancy Category B and is considered safe during pregnancy. It is unknown if this medication is excreted in breast milk.

## 2023-04-18 NOTE — BH CONSULTATION LIAISON PROGRESS NOTE - NSBHCONSULTRECOMMENDOTHER_PSY_A_CORE FT
1) Ativan 2mg IV & Haldol 5mg IV STAT to manage acute agitation  2) Pt. does not have capacity to refuse JANET placement  3) Psychiatry will sign off

## 2023-04-18 NOTE — DISCHARGE NOTE NURSING/CASE MANAGEMENT/SOCIAL WORK - NSDCFUADDAPPT_GEN_ALL_CORE_FT
Please attend your post-operative appointment with your orthopedic surgery team at [address] [place] [address], 12[address] Floor [facility] on 4/27/2023 at 12:30PM.

## 2023-04-18 NOTE — BH CONSULTATION LIAISON PROGRESS NOTE - NSBHCONSULTFOLLOW_PSY_ALL_CORE
No, psychiatric follow up needed - call with questions... Topical Sulfur Applications Pregnancy And Lactation Text: This medication is Pregnancy Category C and has an unknown safety profile during pregnancy. It is unknown if this topical medication is excreted in breast milk.

## 2023-04-18 NOTE — BH CONSULTATION LIAISON PROGRESS NOTE - NSBHFUPINTERVALHXFT_PSY_A_CORE
Pt. seen, chart reviewed. As per Primary Team pt is now refusing [facility] placement; Psychiatry consulted to assess pt's capacity to refuse [facility] placement.     Pt. seen alongside NP Student, the pt is lying in bed, dressed in hospital clothing, agitated, loud, guarded, argumentative on approach. Pt. immediately stated he did not want to go to rehab. He was not able to state clear reasons for this decision, pt only articulated that "I'm not interested in being your case person." He reported that he had never stated yesterday that he had intentions to go to rehab.  Pt. made reference to a previous 2-year long rehab that he seemed to be displeased about, but he reported he was not at all familiar with the current rehab destination. Pt. was able to register 3 objects but only recalled 1 after 5 minutes.  Pt.'s agitation continued to escalate during evaluation and pt was unable to further participate.

## 2023-04-18 NOTE — BH CONSULTATION LIAISON PROGRESS NOTE - NSBHCHARTREVIEWVS_PSY_A_CORE FT
Vital Signs Last 24 Hrs  T(C): 37.4 (18 Apr 2023 15:06), Max: 37.4 (18 Apr 2023 15:06)  T(F): 99.3 (18 Apr 2023 15:06), Max: 99.3 (18 Apr 2023 15:06)  HR: 66 (18 Apr 2023 15:06) (61 - 70)  BP: 122/62 (18 Apr 2023 15:06) (118/57 - 134/64)  BP(mean): --  RR: 18 (18 Apr 2023 15:06) (18 - 18)  SpO2: 99% (18 Apr 2023 15:06) (96% - 100%)    Parameters below as of 18 Apr 2023 15:06  Patient On (Oxygen Delivery Method): room air

## 2023-04-20 LAB
CULTURE RESULTS: SIGNIFICANT CHANGE UP
CULTURE RESULTS: SIGNIFICANT CHANGE UP
SPECIMEN SOURCE: SIGNIFICANT CHANGE UP
SPECIMEN SOURCE: SIGNIFICANT CHANGE UP

## 2023-04-27 ENCOUNTER — APPOINTMENT (OUTPATIENT)
Dept: ORTHOPEDIC SURGERY | Facility: CLINIC | Age: 73
End: 2023-04-27

## 2023-04-28 DIAGNOSIS — Z79.01 LONG TERM (CURRENT) USE OF ANTICOAGULANTS: ICD-10-CM

## 2023-04-28 DIAGNOSIS — I27.82 CHRONIC PULMONARY EMBOLISM: ICD-10-CM
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
## 2023-04-28 DIAGNOSIS — M21.051 VALGUS DEFORMITY, NOT ELSEWHERE CLASSIFIED, RIGHT HIP: ICD-10-CM

## 2023-04-28 DIAGNOSIS — S72.001A FRACTURE OF UNSPECIFIED PART OF NECK OF RIGHT FEMUR, INITIAL ENCOUNTER FOR CLOSED FRACTURE: ICD-10-CM
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
## 2023-04-28 DIAGNOSIS — D50.9 IRON DEFICIENCY ANEMIA, UNSPECIFIED: ICD-10-CM

## 2023-04-28 DIAGNOSIS — R63.8 OTHER SYMPTOMS AND SIGNS CONCERNING FOOD AND FLUID INTAKE: ICD-10-CM

## 2023-04-28 DIAGNOSIS — Z20.822 CONTACT WITH AND (SUSPECTED) EXPOSURE TO COVID-19: ICD-10-CM

## 2023-04-28 DIAGNOSIS — R60.0 LOCALIZED EDEMA: ICD-10-CM

## 2023-04-28 DIAGNOSIS — E16.2 HYPOGLYCEMIA, UNSPECIFIED: ICD-10-CM
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
## 2023-04-28 DIAGNOSIS — F17.210 NICOTINE DEPENDENCE, CIGARETTES, UNCOMPLICATED: ICD-10-CM

## 2023-04-28 DIAGNOSIS — K21.9 GASTRO-ESOPHAGEAL REFLUX DISEASE WITHOUT ESOPHAGITIS: ICD-10-CM

## 2023-04-28 DIAGNOSIS — R41.9 UNSPECIFIED SYMPTOMS AND SIGNS INVOLVING COGNITIVE FUNCTIONS AND AWARENESS: ICD-10-CM

## 2023-04-28 DIAGNOSIS — F20.9 SCHIZOPHRENIA, UNSPECIFIED: ICD-10-CM

## 2023-04-28 DIAGNOSIS — I10 ESSENTIAL (PRIMARY) HYPERTENSION: ICD-10-CM

## 2023-04-28 DIAGNOSIS — Z91.148 PATIENT'S OTHER NONCOMPLIANCE WITH MEDICATION REGIMEN FOR OTHER REASON: ICD-10-CM

## 2023-04-28 DIAGNOSIS — N17.9 ACUTE KIDNEY FAILURE, UNSPECIFIED: ICD-10-CM

## 2023-04-28 DIAGNOSIS — Z87.19 PERSONAL HISTORY OF OTHER DISEASES OF THE DIGESTIVE SYSTEM: ICD-10-CM

## 2023-04-28 DIAGNOSIS — Z59.00 HOMELESSNESS UNSPECIFIED: ICD-10-CM

## 2023-04-28 SDOH — ECONOMIC STABILITY - HOUSING INSECURITY: HOMELESSNESS UNSPECIFIED: Z59.00

## 2023-05-11 ENCOUNTER — RESULT REVIEW (OUTPATIENT)
Age: 73
End: 2023-05-11

## 2023-05-11 ENCOUNTER — OUTPATIENT (OUTPATIENT)
Dept: OUTPATIENT SERVICES | Facility: HOSPITAL | Age: 73
LOS: 1 days | End: 2023-05-11
Payer: MEDICAID

## 2023-05-11 ENCOUNTER — APPOINTMENT (OUTPATIENT)
Dept: ORTHOPEDIC SURGERY | Facility: CLINIC | Age: 73
End: 2023-05-11

## 2023-05-11 VITALS
WEIGHT: 173 LBS | SYSTOLIC BLOOD PRESSURE: 159 MMHG | HEART RATE: 82 BPM | HEIGHT: 64 IN | OXYGEN SATURATION: 100 % | DIASTOLIC BLOOD PRESSURE: 74 MMHG | BODY MASS INDEX: 29.53 KG/M2

## 2023-05-11 DIAGNOSIS — Z98.890 OTHER SPECIFIED POSTPROCEDURAL STATES: Chronic | ICD-10-CM

## 2023-05-11 PROCEDURE — 73502 X-RAY EXAM HIP UNI 2-3 VIEWS: CPT

## 2023-05-11 PROCEDURE — 73502 X-RAY EXAM HIP UNI 2-3 VIEWS: CPT | Mod: 26,RT

## 2023-05-11 NOTE — HISTORY OF PRESENT ILLNESS
[___ Weeks Post Op] : [unfilled] weeks post op [0] : no pain reported [Healed] : healed [Erythema] : not erythematous [Discharge] : absent of discharge [Swelling] : not swollen [Dehiscence] : not dehisced [Neuro Intact] : an unremarkable neurological exam [Vascular Intact] : ~T peripheral vascular exam normal [Xray (Date:___)] : [unfilled] Xray -  [Doing Well] : is doing well [None] : None

## 2023-12-05 NOTE — PROGRESS NOTE ADULT - PROBLEM SELECTOR PROBLEM 1
Problem: PAIN - ADULT  Goal: Verbalizes/displays adequate comfort level or baseline comfort level  Description: Interventions:  - Encourage patient to monitor pain and request assistance  - Assess pain using appropriate pain scale  - Administer analgesics based on type and severity of pain and evaluate response  - Implement non-pharmacological measures as appropriate and evaluate response  - Consider cultural and social influences on pain and pain management  - Notify physician/advanced practitioner if interventions unsuccessful or patient reports new pain  Outcome: Progressing     Problem: INFECTION - ADULT  Goal: Absence or prevention of progression during hospitalization  Description: INTERVENTIONS:  - Assess and monitor for signs and symptoms of infection  - Monitor lab/diagnostic results  - Monitor all insertion sites, i.e. indwelling lines, tubes, and drains  - Monitor endotracheal if appropriate and nasal secretions for changes in amount and color  - [place] appropriate cooling/warming therapies per order  - Administer medications as ordered  - Instruct and encourage patient and family to use good hand hygiene technique  - Identify and instruct in appropriate isolation precautions for identified infection/condition  Outcome: Progressing  Goal: Absence of fever/infection during neutropenic period  Description: INTERVENTIONS:  - Monitor WBC    Outcome: Progressing     Problem: SAFETY ADULT  Goal: Patient will remain free of falls  Description: INTERVENTIONS:  - Educate patient/family on patient safety including physical limitations  - Instruct patient to call for assistance with activity   - Consult OT/PT to assist with strengthening/mobility   - Keep Call bell within reach  - Keep bed low and locked with side rails adjusted as appropriate  - Keep care items and personal belongings within reach  - Initiate and maintain comfort rounds  - Make Fall Risk Sign visible to staff  - Offer Toileting every 3 Hours,  in advance of need    - Apply yellow socks and bracelet for high fall risk patients  - Consider moving patient to room near nurses station  Outcome: Progressing  Goal: Maintain or return to baseline ADL function  Description: INTERVENTIONS:  -  Assess patient's ability to carry out ADLs; assess patient's baseline for ADL function and identify physical deficits which impact ability to perform ADLs (bathing, care of mouth/teeth, toileting, grooming, dressing, etc.)  - Assess/evaluate cause of self-care deficits   - Assess range of motion  - Assess patient's mobility; develop plan if impaired  - Assess patient's need for assistive devices and provide as appropriate  - Encourage maximum independence but intervene and supervise when necessary  - Involve family in performance of ADLs  - Assess for home care needs following discharge   - Consider OT consult to assist with ADL evaluation and planning for discharge  - Provide patient education as appropriate  Outcome: Progressing  Goal: Maintains/Returns to pre admission functional level  Description: INTERVENTIONS:  - Perform AM-PAC 6 Click Basic Mobility/ Daily Activity assessment daily.  - Set and communicate daily mobility goal to care team and patient/family/caregiver.   - Collaborate with rehabilitation services on mobility goals if consulted  - Perform Range of Motion 3 times a day.  - Reposition patient every 3 hours.  - Dangle patient 3 times a day  - Stand patient 3 times a day  - Ambulate patient 3 times a day  - Out of bed to chair 3 times a day   - Out of bed for meals 3 times a day  - Out of bed for toileting  - Record patient progress and toleration of activity level   Outcome: Progressing     Problem: DISCHARGE PLANNING  Goal: Discharge to home or other facility with appropriate resources  Description: INTERVENTIONS:  - Identify barriers to discharge w/patient and caregiver  - Arrange for needed discharge resources and transportation as appropriate  -  Identify discharge learning needs (meds, wound care, etc.)  - Arrange for interpretive services to assist at discharge as needed  - Refer to Case Management Department for coordinating discharge planning if the patient needs post-hospital services based on physician/advanced practitioner order or complex needs related to functional status, cognitive ability, or social support system  Outcome: Progressing     Problem: Knowledge Deficit  Goal: Patient/family/caregiver demonstrates understanding of disease process, treatment plan, medications, and discharge instructions  Description: Complete learning assessment and assess knowledge base.  Interventions:  - Provide teaching at level of understanding  - Provide teaching via preferred learning methods  Outcome: Progressing      Abnormal transaminases

## 2025-07-29 NOTE — PROGRESS NOTE ADULT - PROBLEM SELECTOR PLAN 3
microcytic anemia. suspect iron deficiency. possibly nutritional deficiencies however he does not recall last colonoscopy and has had poor follow up due to schizophrenia and lack of access to care.   - iron studies prior to transfusion   - will need to f/u with GI for colonoscopy Quality 226: Preventive Care And Screening: Tobacco Use: Screening And Cessation Intervention: Patient screened for tobacco use and is an ex/non-smoker Detail Level: Detailed

## (undated) DEVICE — VENODYNE/SCD SLEEVE CALF MEDIUM

## (undated) DEVICE — DRILL BIT STRYKER CANN 4.9X240MM

## (undated) DEVICE — PACK TOTAL HIP

## (undated) DEVICE — DRAPE C ARM 41X74"

## (undated) DEVICE — WARMING BLANKET LOWER ADULT